# Patient Record
Sex: FEMALE | Race: OTHER | NOT HISPANIC OR LATINO | Employment: OTHER | URBAN - METROPOLITAN AREA
[De-identification: names, ages, dates, MRNs, and addresses within clinical notes are randomized per-mention and may not be internally consistent; named-entity substitution may affect disease eponyms.]

---

## 2023-08-27 PROBLEM — K21.00 REFLUX ESOPHAGITIS: Status: ACTIVE | Noted: 2023-08-27

## 2023-08-27 PROBLEM — R13.19 ESOPHAGEAL DYSPHAGIA: Status: ACTIVE | Noted: 2023-08-27

## 2023-08-27 PROBLEM — M17.12 ARTHRITIS OF KNEE, LEFT: Status: ACTIVE | Noted: 2023-08-27

## 2023-08-27 PROBLEM — C78.00: Status: ACTIVE | Noted: 2023-08-27

## 2023-08-27 PROBLEM — I50.9 HEART FAILURE (MULTI): Status: ACTIVE | Noted: 2023-08-27

## 2023-08-27 PROBLEM — I51.89 IMPAIRED CARDIAC FUNCTION: Status: ACTIVE | Noted: 2023-08-27

## 2023-08-27 PROBLEM — R59.0 AXILLARY ADENOPATHY: Status: ACTIVE | Noted: 2023-08-27

## 2023-08-27 PROBLEM — C79.31: Status: ACTIVE | Noted: 2023-08-27

## 2023-08-27 PROBLEM — Z96.1 ARTIFICIAL LENS PRESENT: Status: ACTIVE | Noted: 2023-08-27

## 2023-08-27 PROBLEM — D05.10 DUCTAL CARCINOMA IN SITU (DCIS) OF BREAST: Status: ACTIVE | Noted: 2023-08-27

## 2023-08-27 PROBLEM — R07.89 ATYPICAL CHEST PAIN: Status: ACTIVE | Noted: 2023-08-27

## 2023-08-27 PROBLEM — M54.9 BACKACHE: Status: ACTIVE | Noted: 2023-08-27

## 2023-08-27 PROBLEM — J18.9 PNEUMONIA: Status: ACTIVE | Noted: 2023-08-27

## 2023-08-27 PROBLEM — H25.9 AGE-RELATED CATARACT OF BOTH EYES: Status: ACTIVE | Noted: 2023-08-27

## 2023-08-27 PROBLEM — N95.1 MENOPAUSAL SYMPTOM: Status: ACTIVE | Noted: 2023-08-27

## 2023-08-27 PROBLEM — G47.33 OBSTRUCTIVE SLEEP APNEA: Status: ACTIVE | Noted: 2023-08-27

## 2023-08-27 PROBLEM — H40.013 OPEN ANGLE WITH BORDERLINE FINDINGS, LOW RISK, BILATERAL: Status: ACTIVE | Noted: 2023-08-27

## 2023-08-27 PROBLEM — R93.89 ENDOMETRIAL THICKENING ON ULTRASOUND: Status: ACTIVE | Noted: 2023-08-27

## 2023-08-27 PROBLEM — M54.50 ACUTE LOW BACK PAIN: Status: ACTIVE | Noted: 2023-08-27

## 2023-08-27 PROBLEM — R50.9 FEVER: Status: ACTIVE | Noted: 2023-08-27

## 2023-08-27 PROBLEM — E04.2 NONTOXIC MULTINODULAR GOITER: Status: ACTIVE | Noted: 2023-08-27

## 2023-08-27 PROBLEM — M25.569 KNEE PAIN: Status: ACTIVE | Noted: 2023-08-27

## 2023-08-27 PROBLEM — M47.816 LUMBAR SPONDYLOSIS: Status: ACTIVE | Noted: 2023-08-27

## 2023-08-27 PROBLEM — J02.9 SORE THROAT: Status: ACTIVE | Noted: 2023-08-27

## 2023-08-27 RX ORDER — GABAPENTIN 100 MG/1
CAPSULE ORAL
COMMUNITY
Start: 2023-03-16

## 2023-08-27 RX ORDER — DIPHENHYDRAMINE HCL 50 MG
50 CAPSULE ORAL EVERY 6 HOURS PRN
COMMUNITY

## 2023-08-27 RX ORDER — AMLODIPINE BESYLATE 5 MG/1
TABLET ORAL
COMMUNITY
Start: 2023-04-10

## 2023-08-27 RX ORDER — PRAVASTATIN SODIUM 40 MG/1
40 TABLET ORAL DAILY
COMMUNITY
Start: 2021-07-21 | End: 2023-11-01 | Stop reason: ALTCHOICE

## 2023-08-27 RX ORDER — ASPIRIN 325 MG
50000 TABLET, DELAYED RELEASE (ENTERIC COATED) ORAL
COMMUNITY

## 2023-08-27 RX ORDER — EPINEPHRINE 0.3 MG/.3ML
1 INJECTION SUBCUTANEOUS ONCE AS NEEDED
COMMUNITY

## 2023-08-27 RX ORDER — PRAVASTATIN SODIUM 10 MG/1
10 TABLET ORAL NIGHTLY
COMMUNITY
End: 2023-11-01 | Stop reason: ALTCHOICE

## 2023-08-27 RX ORDER — PRAVASTATIN SODIUM 80 MG/1
80 TABLET ORAL DAILY
COMMUNITY
Start: 2023-08-17 | End: 2024-04-24

## 2023-08-27 RX ORDER — ACETAMINOPHEN 500 MG
50 TABLET ORAL DAILY
COMMUNITY

## 2023-08-27 RX ORDER — ERGOCALCIFEROL 1.25 MG/1
1 CAPSULE ORAL
COMMUNITY

## 2023-08-27 RX ORDER — ASPIRIN 81 MG/1
81 TABLET ORAL DAILY
COMMUNITY
Start: 2018-03-06

## 2023-08-27 RX ORDER — IBUPROFEN 200 MG
200 TABLET ORAL EVERY 6 HOURS PRN
COMMUNITY

## 2023-08-27 RX ORDER — HYDROCORTISONE 25 MG/G
1 CREAM TOPICAL 2 TIMES DAILY
COMMUNITY

## 2023-08-27 RX ORDER — LORATADINE 10 MG/1
10 TABLET ORAL DAILY
COMMUNITY

## 2023-08-27 RX ORDER — PREDNISONE 20 MG/1
20 TABLET ORAL EVERY MORNING
COMMUNITY
End: 2023-11-01 | Stop reason: WASHOUT

## 2023-09-28 DIAGNOSIS — C34.12 MALIGNANT NEOPLASM OF UPPER LOBE OF LEFT LUNG (MULTI): Primary | ICD-10-CM

## 2023-10-05 ENCOUNTER — ANCILLARY PROCEDURE (OUTPATIENT)
Dept: RADIOLOGY | Facility: CLINIC | Age: 73
End: 2023-10-05
Payer: MEDICARE

## 2023-10-05 DIAGNOSIS — E04.2 NONTOXIC MULTINODULAR GOITER: ICD-10-CM

## 2023-10-05 PROCEDURE — 76536 US EXAM OF HEAD AND NECK: CPT

## 2023-10-05 PROCEDURE — 76536 US EXAM OF HEAD AND NECK: CPT | Performed by: RADIOLOGY

## 2023-10-12 ENCOUNTER — ANCILLARY PROCEDURE (OUTPATIENT)
Dept: RADIOLOGY | Facility: CLINIC | Age: 73
End: 2023-10-12
Payer: MEDICARE

## 2023-10-12 DIAGNOSIS — C34.12 MALIGNANT NEOPLASM OF UPPER LOBE OF LEFT LUNG (MULTI): ICD-10-CM

## 2023-10-12 PROCEDURE — 2550000001 HC RX 255 CONTRASTS: Performed by: STUDENT IN AN ORGANIZED HEALTH CARE EDUCATION/TRAINING PROGRAM

## 2023-10-12 PROCEDURE — 72158 MRI LUMBAR SPINE W/O & W/DYE: CPT | Mod: ME

## 2023-10-12 PROCEDURE — A9575 INJ GADOTERATE MEGLUMI 0.1ML: HCPCS | Performed by: STUDENT IN AN ORGANIZED HEALTH CARE EDUCATION/TRAINING PROGRAM

## 2023-10-12 RX ORDER — GADOTERATE MEGLUMINE 376.9 MG/ML
15 INJECTION INTRAVENOUS
Status: COMPLETED | OUTPATIENT
Start: 2023-10-12 | End: 2023-10-12

## 2023-10-12 RX ADMIN — GADOTERATE MEGLUMINE 15 ML: 376.9 INJECTION INTRAVENOUS at 14:39

## 2023-10-13 DIAGNOSIS — E04.2 NONTOXIC MULTINODULAR GOITER: Primary | ICD-10-CM

## 2023-10-17 ENCOUNTER — TELEPHONE (OUTPATIENT)
Dept: HEMATOLOGY/ONCOLOGY | Facility: CLINIC | Age: 73
End: 2023-10-17
Payer: MEDICARE

## 2023-10-17 DIAGNOSIS — M47.816 LUMBAR SPONDYLOSIS: Primary | ICD-10-CM

## 2023-10-17 NOTE — TELEPHONE ENCOUNTER
Read the results of the MRI to Teresa.  She did a teachback.  She will follow up with her PCP concerning her back pain.  Dr. Kincaid notified of this call.

## 2023-10-26 ENCOUNTER — LAB (OUTPATIENT)
Dept: LAB | Facility: LAB | Age: 73
End: 2023-10-26
Payer: MEDICARE

## 2023-10-26 DIAGNOSIS — E04.2 NONTOXIC MULTINODULAR GOITER: Primary | ICD-10-CM

## 2023-10-26 LAB — TSH SERPL-ACNC: 2.43 MIU/L (ref 0.44–3.98)

## 2023-10-26 PROCEDURE — 84443 ASSAY THYROID STIM HORMONE: CPT

## 2023-10-26 PROCEDURE — 36415 COLL VENOUS BLD VENIPUNCTURE: CPT

## 2023-11-01 ENCOUNTER — OFFICE VISIT (OUTPATIENT)
Dept: OPHTHALMOLOGY | Facility: CLINIC | Age: 73
End: 2023-11-01
Payer: MEDICARE

## 2023-11-01 ENCOUNTER — APPOINTMENT (OUTPATIENT)
Dept: OPHTHALMOLOGY | Facility: CLINIC | Age: 73
End: 2023-11-01
Payer: MEDICARE

## 2023-11-01 ENCOUNTER — CLINICAL SUPPORT (OUTPATIENT)
Dept: OPHTHALMOLOGY | Facility: CLINIC | Age: 73
End: 2023-11-01
Payer: MEDICARE

## 2023-11-01 ENCOUNTER — APPOINTMENT (OUTPATIENT)
Dept: ORTHOPEDIC SURGERY | Facility: CLINIC | Age: 73
End: 2023-11-01
Payer: MEDICARE

## 2023-11-01 DIAGNOSIS — H00.021 HORDEOLUM INTERNUM OF RIGHT UPPER EYELID: ICD-10-CM

## 2023-11-01 DIAGNOSIS — H52.7 REFRACTIVE ERROR: ICD-10-CM

## 2023-11-01 DIAGNOSIS — Z96.1 ARTIFICIAL LENS PRESENT: ICD-10-CM

## 2023-11-01 DIAGNOSIS — H02.889 MEIBOMIAN GLAND DYSFUNCTION: ICD-10-CM

## 2023-11-01 DIAGNOSIS — H25.812 COMBINED FORMS OF AGE-RELATED CATARACT OF LEFT EYE: ICD-10-CM

## 2023-11-01 DIAGNOSIS — H40.013 OPEN ANGLE WITH BORDERLINE FINDINGS, LOW RISK, BILATERAL: Primary | ICD-10-CM

## 2023-11-01 PROCEDURE — 99214 OFFICE O/P EST MOD 30 MIN: CPT | Performed by: OPHTHALMOLOGY

## 2023-11-01 PROCEDURE — 92133 CPTRZD OPH DX IMG PST SGM ON: CPT | Performed by: OPHTHALMOLOGY

## 2023-11-01 PROCEDURE — 92015 DETERMINE REFRACTIVE STATE: CPT | Performed by: OPHTHALMOLOGY

## 2023-11-01 RX ORDER — PANTOPRAZOLE SODIUM 40 MG/1
40 TABLET, DELAYED RELEASE ORAL
COMMUNITY
Start: 2023-05-18

## 2023-11-01 RX ORDER — OMEPRAZOLE 40 MG/1
40 CAPSULE, DELAYED RELEASE ORAL
COMMUNITY
Start: 2023-07-12

## 2023-11-01 RX ORDER — TRAMADOL HYDROCHLORIDE 50 MG/1
50 TABLET ORAL
COMMUNITY
Start: 2023-06-01

## 2023-11-01 ASSESSMENT — KERATOMETRY
OS_AXISANGLE2_DEGREES: 50
OD_AXISANGLE2_DEGREES: 180
OS_K1POWER_DIOPTERS: 44.00
OD_K2POWER_DIOPTERS: 44.50
OS_AXISANGLE_DEGREES: 140
OD_K1POWER_DIOPTERS: 44.25
METHOD_AUTO_MANUAL: AUTOMATED
OS_K2POWER_DIOPTERS: 44.25
OD_AXISANGLE_DEGREES: 90

## 2023-11-01 ASSESSMENT — VISUAL ACUITY
OS_CC: 20/30
OS_CC+: -1
METHOD: SNELLEN - SINGLE
OD_CC+: +1
CORRECTION_TYPE: GLASSES
OD_CC: 20/30

## 2023-11-01 ASSESSMENT — REFRACTION_WEARINGRX
OD_ADD: +2.75
OS_AXIS: 068
OD_SPHERE: +1.50
OD_AXIS: 150
OS_ADD: +2.75
OS_SPHERE: +0.50
OS_CYLINDER: -1.00
OD_CYLINDER: -0.25

## 2023-11-01 ASSESSMENT — TONOMETRY
IOP_METHOD: GOLDMANN APPLANATION
OD_IOP_MMHG: 21
OS_IOP_MMHG: 22

## 2023-11-01 ASSESSMENT — REFRACTION_MANIFEST
OD_CYLINDER: -1.25
OS_AXIS: 080
OS_CYLINDER: -1.25
OD_SPHERE: +1.75
OD_AXIS: 100
OS_SPHERE: +1.00

## 2023-11-01 ASSESSMENT — ENCOUNTER SYMPTOMS
CONSTITUTIONAL NEGATIVE: 0
CARDIOVASCULAR NEGATIVE: 0
ALLERGIC/IMMUNOLOGIC NEGATIVE: 0
HEMATOLOGIC/LYMPHATIC NEGATIVE: 0
GASTROINTESTINAL NEGATIVE: 0
EYES NEGATIVE: 0
RESPIRATORY NEGATIVE: 0
NEUROLOGICAL NEGATIVE: 0
LOSS OF SENSATION IN FEET: 0
DEPRESSION: 0
OCCASIONAL FEELINGS OF UNSTEADINESS: 0
ENDOCRINE NEGATIVE: 0
PSYCHIATRIC NEGATIVE: 0
MUSCULOSKELETAL NEGATIVE: 0

## 2023-11-01 ASSESSMENT — PATIENT HEALTH QUESTIONNAIRE - PHQ9
1. LITTLE INTEREST OR PLEASURE IN DOING THINGS: NOT AT ALL
SUM OF ALL RESPONSES TO PHQ9 QUESTIONS 1 AND 2: 0
2. FEELING DOWN, DEPRESSED OR HOPELESS: NOT AT ALL

## 2023-11-01 ASSESSMENT — PAIN SCALES - GENERAL: PAINLEVEL: 6

## 2023-11-01 ASSESSMENT — EXTERNAL EXAM - LEFT EYE: OS_EXAM: NORMAL

## 2023-11-01 ASSESSMENT — SLIT LAMP EXAM - LIDS: COMMENTS: MEIBOMIAN GLAND DYSFUNCTION

## 2023-11-01 ASSESSMENT — CUP TO DISC RATIO
OS_RATIO: 0.45
OD_RATIO: 0.5

## 2023-11-01 ASSESSMENT — EXTERNAL EXAM - RIGHT EYE: OD_EXAM: NORMAL

## 2023-11-01 NOTE — ASSESSMENT & PLAN NOTE
Similar to previous episode left eye (OS). Responded well to heat and didn't require drops. Will have continue warm compress few times daily, can trial drops if not improving.

## 2023-11-01 NOTE — PROGRESS NOTES
Assessment/Plan   Problem List Items Addressed This Visit          Eye/Vision problems    Combined forms of age-related cataract of left eye     Non significant cataract noted on exam. Will plan to continue to monitor with serial exam.            Artificial lens present     Stable intraocular lens (IOL) right eye (OD), will monitor with serial exam.          Open angle with borderline findings, low risk, bilateral - Primary     Stable IOP both eyes (OU), does have slight global decrease in RNFL both eyes (OU) but very thick layers in general. Suspect still only a low risk borderline. Will continue to monitor with serial exam and OCT nerve.          Relevant Orders    OCT, Optic Nerve - OU - Both Eyes    Meibomian gland dysfunction     Baseline meibomian gland dysfunction (MGD), likely contributing to hordeolum. Advised will continue good warm compress.          Hordeolum internum of right upper eyelid     Similar to previous episode left eye (OS). Responded well to heat and didn't require drops. Will have continue warm compress few times daily, can trial drops if not improving.          Refractive error     Discussed glasses prescription from refraction. Will provide if patient interested in keeping for records or to fill as a new set of glasses.               Provided reassurance regarding above diagnoses and care received in the office visit today. Discussed outcomes and options along with the importance of treatment compliance. Understands the importance of any follow up visits. Patient instructed to call/communicate with our office if any new issues, questions, or concerns.     Will plan to see back in 1 year for full with OCT nerve or sooner PRN

## 2023-11-01 NOTE — PATIENT INSTRUCTIONS
Thank you so much for choosing me to provide your care today!    If you were dilated your vision may remain blurry   or light sensitive for several hours.    The nature of eye and vision problems can require frequent follow up, please make every effort to adhere to any future appointments.    If you have any issues, questions, or concerns,   please do not hesitate to reach out.    If you receive a survey in regards to your care today, please mention any exceptional care my office staff and/or technicians provided.    You can reach our office at this number:  [phone]     Warm Compress for Eyes    [x]Warm compress 1-2 time(s) daily to bilateral eye(s)  10-15 minutes on closed eyes  To make a reusable compress:  Place 1 cup dry uncooked rice in a clean sock  Tie a knot  Microwave for 10-20 seconds, may add more time as needed  Compress should be warm but not hot   Replace if soiled or develops bad odor  May follow compress with gentle eyelid massage

## 2023-11-01 NOTE — ASSESSMENT & PLAN NOTE
Baseline meibomian gland dysfunction (MGD), likely contributing to hordeolum. Advised will continue good warm compress.

## 2023-11-01 NOTE — ASSESSMENT & PLAN NOTE
Stable IOP both eyes (OU), does have slight global decrease in RNFL both eyes (OU) but very thick layers in general. Suspect still only a low risk borderline. Will continue to monitor with serial exam and OCT nerve.

## 2023-11-02 ENCOUNTER — LAB (OUTPATIENT)
Dept: LAB | Facility: LAB | Age: 73
End: 2023-11-02
Payer: MEDICARE

## 2023-11-02 DIAGNOSIS — R73.09 OTHER ABNORMAL GLUCOSE: ICD-10-CM

## 2023-11-02 DIAGNOSIS — I10 ESSENTIAL (PRIMARY) HYPERTENSION: Primary | ICD-10-CM

## 2023-11-02 DIAGNOSIS — E55.9 VITAMIN D DEFICIENCY, UNSPECIFIED: ICD-10-CM

## 2023-11-02 LAB
25(OH)D3 SERPL-MCNC: 45 NG/ML (ref 30–100)
ALBUMIN SERPL BCP-MCNC: 4.3 G/DL (ref 3.4–5)
ALP SERPL-CCNC: 72 U/L (ref 33–136)
ALT SERPL W P-5'-P-CCNC: 23 U/L (ref 7–45)
ANION GAP SERPL CALC-SCNC: 12 MMOL/L (ref 10–20)
AST SERPL W P-5'-P-CCNC: 15 U/L (ref 9–39)
BILIRUB SERPL-MCNC: 0.6 MG/DL (ref 0–1.2)
BUN SERPL-MCNC: 17 MG/DL (ref 6–23)
CALCIUM SERPL-MCNC: 10 MG/DL (ref 8.6–10.6)
CHLORIDE SERPL-SCNC: 106 MMOL/L (ref 98–107)
CHOLEST SERPL-MCNC: 224 MG/DL (ref 0–199)
CHOLESTEROL/HDL RATIO: 2.8
CO2 SERPL-SCNC: 29 MMOL/L (ref 21–32)
CREAT SERPL-MCNC: 0.62 MG/DL (ref 0.5–1.05)
ERYTHROCYTE [DISTWIDTH] IN BLOOD BY AUTOMATED COUNT: 13.2 % (ref 11.5–14.5)
EST. AVERAGE GLUCOSE BLD GHB EST-MCNC: 105 MG/DL
GFR SERPL CREATININE-BSD FRML MDRD: >90 ML/MIN/1.73M*2
GLUCOSE SERPL-MCNC: 98 MG/DL (ref 74–99)
HBA1C MFR BLD: 5.3 %
HCT VFR BLD AUTO: 43.9 % (ref 36–46)
HDLC SERPL-MCNC: 80.4 MG/DL
HGB BLD-MCNC: 13.9 G/DL (ref 12–16)
LDLC SERPL CALC-MCNC: 127 MG/DL
MCH RBC QN AUTO: 27 PG (ref 26–34)
MCHC RBC AUTO-ENTMCNC: 31.7 G/DL (ref 32–36)
MCV RBC AUTO: 85 FL (ref 80–100)
NON HDL CHOLESTEROL: 144 MG/DL (ref 0–149)
NRBC BLD-RTO: 0 /100 WBCS (ref 0–0)
PLATELET # BLD AUTO: 172 X10*3/UL (ref 150–450)
POTASSIUM SERPL-SCNC: 4.1 MMOL/L (ref 3.5–5.3)
PROT SERPL-MCNC: 7.2 G/DL (ref 6.4–8.2)
RBC # BLD AUTO: 5.15 X10*6/UL (ref 4–5.2)
SODIUM SERPL-SCNC: 143 MMOL/L (ref 136–145)
TRIGL SERPL-MCNC: 85 MG/DL (ref 0–149)
TSH SERPL-ACNC: 1.35 MIU/L (ref 0.44–3.98)
VIT B12 SERPL-MCNC: 357 PG/ML (ref 211–911)
VLDL: 17 MG/DL (ref 0–40)
WBC # BLD AUTO: 4.7 X10*3/UL (ref 4.4–11.3)

## 2023-11-02 PROCEDURE — 83036 HEMOGLOBIN GLYCOSYLATED A1C: CPT

## 2023-11-02 PROCEDURE — 82607 VITAMIN B-12: CPT

## 2023-11-02 PROCEDURE — 80061 LIPID PANEL: CPT

## 2023-11-02 PROCEDURE — 36415 COLL VENOUS BLD VENIPUNCTURE: CPT

## 2023-11-02 PROCEDURE — 84443 ASSAY THYROID STIM HORMONE: CPT

## 2023-11-02 PROCEDURE — 85027 COMPLETE CBC AUTOMATED: CPT

## 2023-11-02 PROCEDURE — 82306 VITAMIN D 25 HYDROXY: CPT

## 2023-11-02 PROCEDURE — 80053 COMPREHEN METABOLIC PANEL: CPT

## 2023-11-07 ENCOUNTER — HOSPITAL ENCOUNTER (OUTPATIENT)
Dept: RADIOLOGY | Facility: HOSPITAL | Age: 73
Discharge: HOME | End: 2023-11-07
Payer: MEDICARE

## 2023-11-07 VITALS
HEART RATE: 82 BPM | RESPIRATION RATE: 16 BRPM | DIASTOLIC BLOOD PRESSURE: 88 MMHG | TEMPERATURE: 98.4 F | OXYGEN SATURATION: 97 % | SYSTOLIC BLOOD PRESSURE: 167 MMHG

## 2023-11-07 DIAGNOSIS — E04.2 NONTOXIC MULTINODULAR GOITER: ICD-10-CM

## 2023-11-07 PROCEDURE — 2720000007 HC OR 272 NO HCPCS

## 2023-11-07 PROCEDURE — 88112 CYTOPATH CELL ENHANCE TECH: CPT | Mod: TC,MCY | Performed by: RADIOLOGY

## 2023-11-07 PROCEDURE — 76942 ECHO GUIDE FOR BIOPSY: CPT

## 2023-11-07 PROCEDURE — 88173 CYTOPATH EVAL FNA REPORT: CPT | Performed by: PATHOLOGY

## 2023-11-07 PROCEDURE — 88112 CYTOPATH CELL ENHANCE TECH: CPT | Mod: TC | Performed by: RADIOLOGY

## 2023-11-07 PROCEDURE — 2500000005 HC RX 250 GENERAL PHARMACY W/O HCPCS: Performed by: RADIOLOGY

## 2023-11-07 PROCEDURE — 88173 CYTOPATH EVAL FNA REPORT: CPT | Mod: TC | Performed by: RADIOLOGY

## 2023-11-07 RX ORDER — LIDOCAINE HYDROCHLORIDE 10 MG/ML
INJECTION, SOLUTION EPIDURAL; INFILTRATION; INTRACAUDAL; PERINEURAL AS NEEDED
Status: COMPLETED | OUTPATIENT
Start: 2023-11-07 | End: 2023-11-07

## 2023-11-07 RX ADMIN — LIDOCAINE HYDROCHLORIDE 2 ML: 10 INJECTION, SOLUTION EPIDURAL; INFILTRATION; INTRACAUDAL; PERINEURAL at 09:05

## 2023-11-07 ASSESSMENT — PAIN - FUNCTIONAL ASSESSMENT: PAIN_FUNCTIONAL_ASSESSMENT: 0-10

## 2023-11-07 ASSESSMENT — PAIN SCALES - GENERAL
PAINLEVEL_OUTOF10: 5 - MODERATE PAIN
PAINLEVEL_OUTOF10: 0 - NO PAIN
PAINLEVEL_OUTOF10: 0 - NO PAIN
PAINLEVEL_OUTOF10: 4
PAINLEVEL_OUTOF10: 0 - NO PAIN
PAINLEVEL_OUTOF10: 0 - NO PAIN
PAINLEVEL_OUTOF10: 7

## 2023-11-07 ASSESSMENT — COLUMBIA-SUICIDE SEVERITY RATING SCALE - C-SSRS
2. HAVE YOU ACTUALLY HAD ANY THOUGHTS OF KILLING YOURSELF?: NO
1. IN THE PAST MONTH, HAVE YOU WISHED YOU WERE DEAD OR WISHED YOU COULD GO TO SLEEP AND NOT WAKE UP?: NO
6. HAVE YOU EVER DONE ANYTHING, STARTED TO DO ANYTHING, OR PREPARED TO DO ANYTHING TO END YOUR LIFE?: NO

## 2023-11-08 ENCOUNTER — OFFICE VISIT (OUTPATIENT)
Dept: ORTHOPEDIC SURGERY | Facility: CLINIC | Age: 73
End: 2023-11-08
Payer: MEDICARE

## 2023-11-08 ENCOUNTER — ANCILLARY PROCEDURE (OUTPATIENT)
Dept: RADIOLOGY | Facility: CLINIC | Age: 73
End: 2023-11-08
Payer: MEDICARE

## 2023-11-08 DIAGNOSIS — M47.816 LUMBAR SPONDYLOSIS: ICD-10-CM

## 2023-11-08 PROCEDURE — 1160F RVW MEDS BY RX/DR IN RCRD: CPT | Performed by: ORTHOPAEDIC SURGERY

## 2023-11-08 PROCEDURE — 72110 X-RAY EXAM L-2 SPINE 4/>VWS: CPT

## 2023-11-08 PROCEDURE — 1125F AMNT PAIN NOTED PAIN PRSNT: CPT | Performed by: ORTHOPAEDIC SURGERY

## 2023-11-08 PROCEDURE — 1159F MED LIST DOCD IN RCRD: CPT | Performed by: ORTHOPAEDIC SURGERY

## 2023-11-08 PROCEDURE — 1036F TOBACCO NON-USER: CPT | Performed by: ORTHOPAEDIC SURGERY

## 2023-11-08 PROCEDURE — 99214 OFFICE O/P EST MOD 30 MIN: CPT | Performed by: ORTHOPAEDIC SURGERY

## 2023-11-08 PROCEDURE — 72110 X-RAY EXAM L-2 SPINE 4/>VWS: CPT | Performed by: RADIOLOGY

## 2023-11-08 ASSESSMENT — PAIN - FUNCTIONAL ASSESSMENT: PAIN_FUNCTIONAL_ASSESSMENT: 0-10

## 2023-11-08 ASSESSMENT — PAIN SCALES - GENERAL: PAINLEVEL_OUTOF10: 6

## 2023-11-08 ASSESSMENT — PAIN DESCRIPTION - DESCRIPTORS: DESCRIPTORS: ACHING;SORE

## 2023-11-08 NOTE — PROGRESS NOTES
Chief Complaint: Chronic low back pain    HPI: Teresa Max is a 73 y.o. year old female patient with history of lung cancer and breast cancer status post chemo and radiation treatments who presents with chronic axial low back pain has been on and off for several years.  No associated trauma or injury that she can recall.  Worse with activity such as bending or getting up from a sitting to standing position.  She otherwise denies any radicular leg pain or claudication pain in her lower extremities.  Denies any numbness tingling or paresthesia.  Denies any bowel or bladder disturbances or saddle anesthesia or incontinence.  No history of spinal surgery injections or physical therapy.  She does take ibuprofen as needed which provides some temporary relief.      Review of Systems    All other systems have been reviewed and are negative for complaint. All pertinent positive and negative as listed in history of present illness.    Past Medical History:   Diagnosis Date    Combined forms of age-related cataract of left eye     Disorder of refraction     Eyelid inflammation     Intraductal carcinoma in situ of unspecified breast 02/19/2018    DCIS (ductal carcinoma in situ) of breast    Malignant neoplasm of unspecified part of unspecified bronchus or lung (CMS/HCC) 02/17/2016    Non-small cell lung cancer (NSCLC)    Open angle with borderline findings, low risk, bilateral     Pseudophakia         Past Surgical History:   Procedure Laterality Date    BREAST LUMPECTOMY  02/17/2016    Breast Surgery Lumpectomy    CATARACT EXTRACTION Right     2016 Dr Barry    CT GUIDED IMAGING FOR NEEDLE PLACEMENT  05/22/2015    CT GUIDED IMAGING FOR NEEDLE PLACEMENT [facility]    INTRAOCULAR LENS INSERTION      OTHER SURGICAL HISTORY  02/17/2016    Biopsy Lung Percutaneous    OTHER SURGICAL HISTORY  07/21/2021    Tonsillectomy with adenoidectomy    OTHER SURGICAL HISTORY  02/16/2018    Radiation Therapy    US GUIDED THYROID  BIOPSY  11/7/2023    US GUIDED THYROID BIOPSY 11/7/2023 CAT US        Allergies   Allergen Reactions    Acetaminophen Anaphylaxis, Hives, Itching and Rash    Azithromycin Anaphylaxis, Hives, Itching and Rash    Cpm-Pseudoephed-Acetaminophen Anaphylaxis    Guaifenesin Anaphylaxis and Rash    Hydrocodone Hives and Itching    Gsvybqjiv-Bt-Xfeqmzmj-Guaifen Unknown    Ceftriaxone Itching        Current Outpatient Medications on File Prior to Visit   Medication Sig Dispense Refill    amLODIPine (Norvasc) 5 mg tablet       aspirin 81 mg EC tablet Take 1 tablet (81 mg) by mouth once daily. AS DIRECTED.      cholecalciferol (Vitamin D-3) 1,250 mcg (50,000 unit) capsule Take 1 capsule (50,000 Units) by mouth 1 (one) time per week.      cholecalciferol (Vitamin D3) 50 mcg (2,000 unit) capsule Take 1 capsule (50 mcg) by mouth once daily.      diphenhydrAMINE (BENADryl) 50 mg capsule Take 1 capsule (50 mg) by mouth every 6 hours if needed for itching.      ergocalciferol (Vitamin D-2) 1.25 MG (44980 UT) capsule Take 1 capsule (1,250 mcg) by mouth.      hydrocortisone 2.5 % cream Apply 1 Application topically 2 times a day.      ibuprofen 200 mg tablet Take 1 tablet (200 mg) by mouth every 6 hours if needed.      omeprazole (PriLOSEC) 40 mg DR capsule Take 1 capsule (40 mg) by mouth once daily in the morning. Take before meals.      pantoprazole (ProtoNix) 40 mg EC tablet Take 1 tablet (40 mg) by mouth once daily in the morning. Take before meals.      pravastatin (Pravachol) 80 mg tablet Take 1 tablet (80 mg) by mouth once daily.      traMADol (Ultram) 50 mg tablet Take 1 tablet (50 mg) by mouth.      EPINEPHrine 0.3 mg/0.3 mL injection syringe Inject 0.3 mL (0.3 mg) as directed 1 time if needed (shortness of breath).      gabapentin (Neurontin) 100 mg capsule       loratadine (Claritin) 10 mg tablet Take 1 tablet (10 mg) by mouth once daily.       No current facility-administered medications on file prior to visit.          PE:    General: Patient appears well-nourished and well-developed in no acute distress, Alert and Oriented x3  Psych: Pleasant mood and affect  HEENT: Extraocular muscles intact, pupils equal and round. Sclerae anicteric   Cardio: extremities warm and well perfused  Resp: unlabored symmetric breathing  Skin: no open wounds or rash  Musculoskeletal/Neuro Exam: Normal gait.  Mild tenderness to palpation along the midline lumbar and paraspinal.  Negative straight leg raise bilaterally.  She actually has pretty good flexibility in her hamstrings bilaterally no groin pain with ROM of the hip joints with IR and ER.  Negative ASIF bilaterally.    Lower extremity    Motor: Right leg with 5 out of 5 motor strength with hip flexion, knee extension, ankle dorsiflexion plantarflexion and EHL against resistance.  Left leg with 5 out of 5 motor strength with hip flexion, knee extension, ankle dorsiflexion plantarflexion EHL against resistance  Sensation to light touch intact along L2 to S1 distribution bilaterally  2+ patella and achilles Reflex bilaterally        Imaging:  I reviewed imaging obtained in clinic today AP lateral and oblique views of the lumbar spine.  No acute fractures or spondylolisthesis or dynamic instability.  She does have some degenerative changes with disc base narrowing at L5-S1 facet arthrosis.  I also see this round calcification in her left upper abdomen region.  This was also seen in previous CT scan of the abdomen and chest which according to radiologist has been stable.  Patient was informed about that she was not aware of this calcification.    I also reviewed an MRI of the lumbar spine from October 12, 2023 which showed no evidence of metastatic lesions.  She does have 6 multilevel degenerative changes and smarts nodes.  She has no significant stenosis or nerve impingement.                A/P: Teresa Max is a 73 y.o. year old female patient with history of chronic axial low back pain no associated  trauma or injury.  She has MRI that demonstrated no metastatic lesion despite her history of lung and breast cancer.  MRI and x-ray does show some multilevel degenerative changes and some Schmorl's nodes in her lumbar spine.  I review all the images with her in clinic today.  I recommend trying conservative treatments first with physical therapy for core back and lower extremity stretching and strengthening exercises which she is agreeable to.  She was given referral for therapy today.  If she does not see any relief then I would like to see her back in clinic.  She can continue anti-inflammatories as needed I also discussed using topical ointments and patches over-the-counter as well to help with any significant pain. After our discussion, the patient articulated understanding of the plan and felt that all questions had been answered satisfactorily. The patient was pleased with the visit and very appreciative for the care rendered.    **Please excuse any errors in grammar or translation related to this dictation. Voice recognition software was utilized to prepare this document. **                Jeannine Hand MD    Department of Orthopaedic Surgery  [facility]  [email or web address]

## 2023-11-09 LAB
LABORATORY COMMENT REPORT: NORMAL
LABORATORY COMMENT REPORT: NORMAL
PATH REPORT.FINAL DX SPEC: NORMAL
PATH REPORT.GROSS SPEC: NORMAL
PATH REPORT.RELEVANT HX SPEC: NORMAL
PATH REPORT.TOTAL CANCER: NORMAL

## 2024-03-27 ENCOUNTER — LAB (OUTPATIENT)
Dept: LAB | Facility: LAB | Age: 74
End: 2024-03-27
Payer: MEDICARE

## 2024-03-27 ENCOUNTER — TELEPHONE (OUTPATIENT)
Dept: HEMATOLOGY/ONCOLOGY | Facility: CLINIC | Age: 74
End: 2024-03-27

## 2024-03-27 DIAGNOSIS — I82.5Z9 CHRONIC DEEP VEIN THROMBOSIS (DVT) OF DISTAL VEIN OF LOWER EXTREMITY, UNSPECIFIED LATERALITY (MULTI): ICD-10-CM

## 2024-03-27 NOTE — PROGRESS NOTES
Subjective:   Patient Name: Teresa Max    : 1950     MRN: [id number]     Age: 73 y.o.     Gender: female  Referring Provider: SKIP    CC: Management of stage IIIB ([id number]) lung adenocarcinoma of OLIVER dx in 2015 s/p concurrent chemoRTàmetastatic recurrence  in 2016 with brain lesion, axillary LNs s/p gamma knife, alectinib 2016-2018-->again recurrence in 2018 s/p keytruda  2018-2020. PDL-1 50%, NGS showed ALK fusion. GALLO. TMB 7.9. Currently on surveillance.     Presenting History (2023):  At time of initial presentation a 72 F, remote lighter smoker (when she was a teenage) with PMHx of DCISof L breast in , HTN, HLD, NSCLC presented here for continued care of her lung cancer. She was initially diagnosed with stage IIIB (T1N3M0) on  2015. PET/CT on 2015 showed primary lung carcinoma in the OLIVER measuring 1.2cm, with large L and R hilar LAD. Brain MRI (+/-) 2015 no brain metastases. She underwent concurrent chemoRT with cisplatin and pemetrexed completed in 2015.  She did not tolerate very well.     Unfortunately, she had metastatic recurrence in 2016 with PET/CT on 2016 showed high hypermetabolic activity in the L axillary LNs, consistent with mets, which was biopsy proven to be metastatic lung adenocarcinoma. Brain MRI (+/-) on 2016  showed bilateral enhancing lesions within the occipital lobes.She underwent gamma knife with Dr. Diaz to 3 brain lesions. She was then started on alectinib 600mg bid in 2016.     Unfortunately, her PET/CT on 1/3/2018 showed POS with interval worsening of hypermetabolic L axillary and retropectoral LAD. Brain MRI 2018: unchanged. L axillary LN biopsy on 2018 which showed poorly differentiated lung adenocarcinoma. PDL-1  50%. NGS showed ALK (20)-EML4(6) fusion, and IDH1 yW255W (c338T>C) mutation, TMB 7.9 m/MB, MSI stable. She was then started on Keytruda on 2018-2020. She has been on  surveillance since. Most recent CT CAP (+) on 3/6/2023 showed no evidence  of recurrence except for mosaic lung pattern and RML fibrosis. 4.3cm L adnexal cyst. Brain MRI (+/-) 03/01/2023: Interval decreased size of the enhancing lesion within the left occipital lobe measuring 0.4 cm, previously 0.5 cm. Decreased surrounding  T2 and FLAIR hyperintense signal compatible with vasogenic edema. No new enhancing lesions.     She has been having chronic back pain, worsening with standing and moving, intermittently, tolerable. No fever or chills. No cough or SOB. No n/v/d/c. No HA or vision change.      PMHx: DCIS 2008, HTN, HLD, NSCLC  PSHx: Breast lumpectomy in 2008  SHx: Remote light smoker, no etoh or illicit drugs. She worked at [employer]. Lives with . 3 children and 4 grandchildren.   FHx: Mother had brain tumor (unclear primary site). No other family hx of cancer.         Treatment Summary:  05/2015-07/2015: Concurrent chemoRT with cisplatin/pemetrexed  03/2016: Gamma knife to 3 brain mets (Dr. Mickey Diaz) L occipital (18 Gy x 1 fraction), R occipital (20 Gy x 1 fraction) and L precuneus (20 Gy x 1 fraction)  04/2016 - 06/2018: started Alectinib 300mg bid-->600mg bid PO  06/11/2018-08/26/2020: Keytruda    Interval History (3/27/2024):  She returned for a follow up. Overall feeling well except for some fatigue. Also having chronic back pain and L knee pain for which is managed by her PCP. Otherwise no cough, SOB, n/v/d/c, fever or chills. Eating and voiding well. Wants to travel to [place] this year if possible.        ROS:  Patient denies the below review of systems, except for changes as noted in the interval history.    General:  Fatigue, anorexia, fevers, sweats, chills, heat/cold intolerance, weight changes.  HEENT:  Icterus, congestion, sore throat, rhinorrhea, taste change, voice changes.  Neurology:  Headache, dizziness, vision changes, hearing changes, other motor/sensory loss, gait instability,  neuropathies.  Pulmonology:  Cough, wheezing, hemoptysis, dyspnea at rest, dyspnea on exertion, pleuritic chest pain.  Cardiology:  Chest pain, palpitations, orthopnea, paroxysmal nocturnal dyspnea.  Gastroenterology:  Nausea, vomiting, reflux symptoms, abdominal pain, constipation, diarrhea, melena, bright red blood per rectum.  Genitourinary:  Dysuria, polyuria, urine color change, urine smell change, hematuria.   Musculoskeletal:  Arthralgias, myalgias, joint swelling, focal weakness.  Skin:  Rash, pruritis, jaundice, petechiae, nail changes, skin nodules/growth.  Psychology:  Anxiety, depression, suicidal ideation, homicidal ideation.  Heme:  Easy bleeding or bruising.  Others:   All other systems reviewed and are negative.    Medical History:   Past Medical History:   Diagnosis Date    Combined forms of age-related cataract of left eye     Disorder of refraction     Eyelid inflammation     Intraductal carcinoma in situ of unspecified breast 02/19/2018    DCIS (ductal carcinoma in situ) of breast    Malignant neoplasm of unspecified part of unspecified bronchus or lung (CMS/HCC) 02/17/2016    Non-small cell lung cancer (NSCLC)    Open angle with borderline findings, low risk, bilateral     Pseudophakia        Surgical History:   Past Surgical History:   Procedure Laterality Date    BREAST LUMPECTOMY  02/17/2016    Breast Surgery Lumpectomy    CATARACT EXTRACTION Right     2016 Dr Barry    CT GUIDED IMAGING FOR NEEDLE PLACEMENT  05/22/2015    CT GUIDED IMAGING FOR NEEDLE PLACEMENT [facility]    INTRAOCULAR LENS INSERTION      OTHER SURGICAL HISTORY  02/17/2016    Biopsy Lung Percutaneous    OTHER SURGICAL HISTORY  07/21/2021    Tonsillectomy with adenoidectomy    OTHER SURGICAL HISTORY  02/16/2018    Radiation Therapy    US GUIDED THYROID BIOPSY  11/7/2023    US GUIDED THYROID BIOPSY 11/7/2023 CAT US       Family History:  Family History   Problem Relation Name Age of Onset    Other (brain tumor) Mother       Cancer Mother         Allergies:  Allergies   Allergen Reactions    Acetaminophen Anaphylaxis, Hives, Itching and Rash    Azithromycin Anaphylaxis, Hives, Itching and Rash    Cpm-Pseudoephed-Acetaminophen Anaphylaxis    Guaifenesin Anaphylaxis and Rash    Hydrocodone Hives and Itching    Cqnwoiuyq-Uv-Ehcshldq-Guaifen Unknown    Ceftriaxone Itching       Meds (Current):    Current Outpatient Medications:     amLODIPine (Norvasc) 5 mg tablet, , Disp: , Rfl:     aspirin 81 mg EC tablet, Take 1 tablet (81 mg) by mouth once daily. AS DIRECTED., Disp: , Rfl:     cholecalciferol (Vitamin D-3) 1,250 mcg (50,000 unit) capsule, Take 1 capsule (50,000 Units) by mouth 1 (one) time per week., Disp: , Rfl:     cholecalciferol (Vitamin D3) 50 mcg (2,000 unit) capsule, Take 1 capsule (50 mcg) by mouth once daily., Disp: , Rfl:     diphenhydrAMINE (BENADryl) 50 mg capsule, Take 1 capsule (50 mg) by mouth every 6 hours if needed for itching., Disp: , Rfl:     EPINEPHrine 0.3 mg/0.3 mL injection syringe, Inject 0.3 mL (0.3 mg) as directed 1 time if needed (shortness of breath)., Disp: , Rfl:     ergocalciferol (Vitamin D-2) 1.25 MG (72169 UT) capsule, Take 1 capsule (1,250 mcg) by mouth., Disp: , Rfl:     gabapentin (Neurontin) 100 mg capsule, , Disp: , Rfl:     hydrocortisone 2.5 % cream, Apply 1 Application topically 2 times a day., Disp: , Rfl:     ibuprofen 200 mg tablet, Take 1 tablet (200 mg) by mouth every 6 hours if needed., Disp: , Rfl:     loratadine (Claritin) 10 mg tablet, Take 1 tablet (10 mg) by mouth once daily., Disp: , Rfl:     omeprazole (PriLOSEC) 40 mg DR capsule, Take 1 capsule (40 mg) by mouth once daily in the morning. Take before meals., Disp: , Rfl:     pantoprazole (ProtoNix) 40 mg EC tablet, Take 1 tablet (40 mg) by mouth once daily in the morning. Take before meals., Disp: , Rfl:     pravastatin (Pravachol) 80 mg tablet, Take 1 tablet (80 mg) by mouth once daily., Disp: , Rfl:     traMADol (Ultram) 50  mg tablet, Take 1 tablet (50 mg) by mouth., Disp: , Rfl:     Pain Assessment:  Pain is: mild, back and L knee pain    Performance Status (ECOG): 1         ECOG Definition  0 Fully active; no performance restrictions.  1 Strenuous physical activity restricted; fully ambulatory and able to carry out light work.  2 Capable of all self-care but unable to carry out any work activities. Up and about >50% of waking hours.  3 Capable of only limited self-care; confined to bed or chair >50% of waking hours.  4 Completely disabled; cannot carry out any self-care; totally confined to bed or chair.      Exam:  There were no vitals taken for this visit.  General: Well appearing, no acute distress  Neurology: Alert and oriented x3, CN II-XII grossly intact  Psychology: Affect appropriate  Eyes: PERRL, EOMI  ENT: Mucus membranes moist and intact, no ulcers  Lymphatics: No palpable adenopathy  Neck: Supple, no masses  Respiratory: Lungs clear bilaterally, no wheezes, no rales, no rhonchi  Cardiovascular: Normal rate and rhythm, no murmur  Abdomen: Soft, non-tender, non-distended, normoactive, no hepatosplenomegaly, no ascites  Musculoskeletal: Normal gait and stance  Extremities: No clubbing, no cyanosis, no edema  Skin: No rash  Genitourinary: Deferred  Rectal: Deferred   Pelvic: Deferred  Breasts: Deferred    Labs:  Reviewed  Assessment/Plan:   73 y.o. female with past medical history as stated referred for management of lung cancer    The patient's records and imaging have been reviewed in detail.  I have discussed with the patient the natural history of their disease at length.  The following has also been discussed with the patient:    # Cancer:  Management of stage IIIB ([id number]) lung adenocarcinoma of OLIVER dx in 05/2015 s/p concurrent chemoRT -->metastatic recurrence in 01/2016 with brain lesion, axillary LNs s/p gamma knife, alectinib 04/2016-06/2018 --> again recurrence in 06/2018 s/p keytruda 06/2018-08/2020. PDL-1 50%,  NGS showed ALK fusion. GALLO. TMB 7.9. Currently on surveillance. Continue to do well. No evidence of recurrence.      - Interval Imaging: CT CAP (+) on 03/28/2024 showed No change or new  metastatic disease in the chest. Brain MRI (+/-) 03/28/2024: 1. Redemonstration of a 3-4 mm enhancing nodule in the left occipital lobe, relatively similar from the previous examination. No new enhancing lesions are identified. 2. Mild white-matter changes are nonspecific but may represent small-vessel ischemic disease in a patient of this age.    # DCIS: dx in 2008, s/p lumpectomy and RT and 5 years of hormonal therapy. Most recent mammogram 02/2023: no evidence of recurrence.  To follow up with her GYN.     # Lower back pain: no evidence of mets. Follow up with PT an dPC.     # L adnexal cyst: US showed fibroid uterus and L ovarian cyst. To follow with her GYN    # Thyroid nodule: s/p thyroid nodule biopsy on 4/20/2021: benign follicular nodule.     # Clinical Trials:  None currently.      # Access: Peripheral veins.     # Pain: No acute pain.     # Bone Health: No signs of bony disease.      # GI/CINV: No acute issues.  Eating and voiding well.  Nutrition consult offered.     # Smoking: Former smoker     # Fertility: N/A.  Oncofertility support available as needed.       # Heme/ESAs: N/A.     # GOC: Patient is aware of palliative intent goals of care.     # Language: English.     # Dispo: RTC in 6 months

## 2024-03-28 ENCOUNTER — HOSPITAL ENCOUNTER (OUTPATIENT)
Dept: RADIOLOGY | Facility: CLINIC | Age: 74
Discharge: HOME | End: 2024-03-28
Payer: MEDICARE

## 2024-03-28 ENCOUNTER — OFFICE VISIT (OUTPATIENT)
Dept: HEMATOLOGY/ONCOLOGY | Facility: CLINIC | Age: 74
End: 2024-03-28
Payer: MEDICARE

## 2024-03-28 ENCOUNTER — LAB (OUTPATIENT)
Dept: LAB | Facility: CLINIC | Age: 74
End: 2024-03-28
Payer: MEDICARE

## 2024-03-28 VITALS
BODY MASS INDEX: 29.05 KG/M2 | TEMPERATURE: 97.9 F | HEIGHT: 61 IN | SYSTOLIC BLOOD PRESSURE: 159 MMHG | OXYGEN SATURATION: 96 % | DIASTOLIC BLOOD PRESSURE: 93 MMHG | RESPIRATION RATE: 18 BRPM | WEIGHT: 153.88 LBS | HEART RATE: 97 BPM

## 2024-03-28 DIAGNOSIS — C34.12 MALIGNANT NEOPLASM OF UPPER LOBE OF LEFT LUNG (MULTI): ICD-10-CM

## 2024-03-28 DIAGNOSIS — C79.31 MALIGNANT MELANOMA METASTATIC TO BRAIN (MULTI): Primary | ICD-10-CM

## 2024-03-28 DIAGNOSIS — I82.5Z9 CHRONIC DEEP VEIN THROMBOSIS (DVT) OF DISTAL VEIN OF LOWER EXTREMITY, UNSPECIFIED LATERALITY (MULTI): ICD-10-CM

## 2024-03-28 DIAGNOSIS — N83.202 CYST OF LEFT OVARY: ICD-10-CM

## 2024-03-28 DIAGNOSIS — C79.31 MALIGNANT MELANOMA METASTATIC TO BRAIN (MULTI): ICD-10-CM

## 2024-03-28 LAB
ALBUMIN SERPL BCP-MCNC: 4.3 G/DL (ref 3.4–5)
ALP SERPL-CCNC: 79 U/L (ref 33–136)
ALT SERPL W P-5'-P-CCNC: 18 U/L (ref 7–45)
ANION GAP SERPL CALC-SCNC: 11 MMOL/L (ref 10–20)
AST SERPL W P-5'-P-CCNC: 16 U/L (ref 9–39)
BASOPHILS # BLD AUTO: 0.04 X10*3/UL (ref 0–0.1)
BASOPHILS NFR BLD AUTO: 0.8 %
BILIRUB SERPL-MCNC: 0.5 MG/DL (ref 0–1.2)
BUN SERPL-MCNC: 16 MG/DL (ref 6–23)
CALCIUM SERPL-MCNC: 9.8 MG/DL (ref 8.6–10.3)
CHLORIDE SERPL-SCNC: 102 MMOL/L (ref 98–107)
CO2 SERPL-SCNC: 31 MMOL/L (ref 21–32)
CREAT SERPL-MCNC: 0.73 MG/DL (ref 0.5–1.05)
EGFRCR SERPLBLD CKD-EPI 2021: 88 ML/MIN/1.73M*2
EOSINOPHIL # BLD AUTO: 0.14 X10*3/UL (ref 0–0.4)
EOSINOPHIL NFR BLD AUTO: 2.9 %
ERYTHROCYTE [DISTWIDTH] IN BLOOD BY AUTOMATED COUNT: 12.8 % (ref 11.5–14.5)
GLUCOSE SERPL-MCNC: 95 MG/DL (ref 74–99)
HCT VFR BLD AUTO: 42.6 % (ref 36–46)
HGB BLD-MCNC: 13.6 G/DL (ref 12–16)
IMM GRANULOCYTES # BLD AUTO: 0.01 X10*3/UL (ref 0–0.5)
IMM GRANULOCYTES NFR BLD AUTO: 0.2 % (ref 0–0.9)
LYMPHOCYTES # BLD AUTO: 1.41 X10*3/UL (ref 0.8–3)
LYMPHOCYTES NFR BLD AUTO: 29.1 %
MCH RBC QN AUTO: 26.5 PG (ref 26–34)
MCHC RBC AUTO-ENTMCNC: 31.9 G/DL (ref 32–36)
MCV RBC AUTO: 83 FL (ref 80–100)
MONOCYTES # BLD AUTO: 0.5 X10*3/UL (ref 0.05–0.8)
MONOCYTES NFR BLD AUTO: 10.3 %
NEUTROPHILS # BLD AUTO: 2.74 X10*3/UL (ref 1.6–5.5)
NEUTROPHILS NFR BLD AUTO: 56.7 %
NRBC BLD-RTO: 0 /100 WBCS (ref 0–0)
PLATELET # BLD AUTO: 152 X10*3/UL (ref 150–450)
POTASSIUM SERPL-SCNC: 3.7 MMOL/L (ref 3.5–5.3)
PROT SERPL-MCNC: 7.4 G/DL (ref 6.4–8.2)
RBC # BLD AUTO: 5.13 X10*6/UL (ref 4–5.2)
SODIUM SERPL-SCNC: 140 MMOL/L (ref 136–145)
WBC # BLD AUTO: 4.8 X10*3/UL (ref 4.4–11.3)

## 2024-03-28 PROCEDURE — 1125F AMNT PAIN NOTED PAIN PRSNT: CPT | Performed by: STUDENT IN AN ORGANIZED HEALTH CARE EDUCATION/TRAINING PROGRAM

## 2024-03-28 PROCEDURE — 70553 MRI BRAIN STEM W/O & W/DYE: CPT | Performed by: RADIOLOGY

## 2024-03-28 PROCEDURE — 99214 OFFICE O/P EST MOD 30 MIN: CPT | Performed by: STUDENT IN AN ORGANIZED HEALTH CARE EDUCATION/TRAINING PROGRAM

## 2024-03-28 PROCEDURE — 2550000001 HC RX 255 CONTRASTS: Performed by: STUDENT IN AN ORGANIZED HEALTH CARE EDUCATION/TRAINING PROGRAM

## 2024-03-28 PROCEDURE — 74177 CT ABD & PELVIS W/CONTRAST: CPT | Performed by: STUDENT IN AN ORGANIZED HEALTH CARE EDUCATION/TRAINING PROGRAM

## 2024-03-28 PROCEDURE — 70553 MRI BRAIN STEM W/O & W/DYE: CPT

## 2024-03-28 PROCEDURE — 36415 COLL VENOUS BLD VENIPUNCTURE: CPT

## 2024-03-28 PROCEDURE — A9575 INJ GADOTERATE MEGLUMI 0.1ML: HCPCS | Performed by: STUDENT IN AN ORGANIZED HEALTH CARE EDUCATION/TRAINING PROGRAM

## 2024-03-28 PROCEDURE — 74177 CT ABD & PELVIS W/CONTRAST: CPT

## 2024-03-28 PROCEDURE — 71260 CT THORAX DX C+: CPT | Performed by: STUDENT IN AN ORGANIZED HEALTH CARE EDUCATION/TRAINING PROGRAM

## 2024-03-28 PROCEDURE — 80053 COMPREHEN METABOLIC PANEL: CPT

## 2024-03-28 PROCEDURE — 1159F MED LIST DOCD IN RCRD: CPT | Performed by: STUDENT IN AN ORGANIZED HEALTH CARE EDUCATION/TRAINING PROGRAM

## 2024-03-28 PROCEDURE — 85025 COMPLETE CBC W/AUTO DIFF WBC: CPT

## 2024-03-28 PROCEDURE — 2500000004 HC RX 250 GENERAL PHARMACY W/ HCPCS (ALT 636 FOR OP/ED): Performed by: STUDENT IN AN ORGANIZED HEALTH CARE EDUCATION/TRAINING PROGRAM

## 2024-03-28 RX ORDER — GADOTERATE MEGLUMINE 376.9 MG/ML
14 INJECTION INTRAVENOUS
Status: COMPLETED | OUTPATIENT
Start: 2024-03-28 | End: 2024-03-28

## 2024-03-28 RX ADMIN — GADOTERATE MEGLUMINE 14 ML: 376.9 INJECTION INTRAVENOUS at 13:58

## 2024-03-28 RX ADMIN — IOHEXOL 75 ML: 350 INJECTION, SOLUTION INTRAVENOUS at 13:13

## 2024-03-28 ASSESSMENT — PAIN SCALES - GENERAL: PAINLEVEL: 6

## 2024-03-28 NOTE — TELEPHONE ENCOUNTER
Spoke to Teresa.  Lab orders are in and she will come to the [facility] for a draw prior to her CT today.

## 2024-03-28 NOTE — PROGRESS NOTES
Patient here today for follow up. She was last here in September. She has generalized pain throughout her body. She has some SOB with exertion.     Fatigue: present, some difficulty completed IADLs  PO intake: adequate  N/V/D/C: denies    Medications reviewed with patient.     She will consider a referral to [facility] to follow for breast cancer survivorship.     Follow up in 6 months.

## 2024-04-09 ENCOUNTER — HOSPITAL ENCOUNTER (OUTPATIENT)
Dept: RADIOLOGY | Facility: CLINIC | Age: 74
Discharge: HOME | End: 2024-04-09
Payer: MEDICARE

## 2024-04-09 VITALS — BODY MASS INDEX: 28.7 KG/M2 | WEIGHT: 152 LBS | HEIGHT: 61 IN

## 2024-04-09 DIAGNOSIS — M85.80 OTHER SPECIFIED DISORDERS OF BONE DENSITY AND STRUCTURE, UNSPECIFIED SITE: ICD-10-CM

## 2024-04-09 DIAGNOSIS — Z12.31 ENCOUNTER FOR SCREENING MAMMOGRAM FOR MALIGNANT NEOPLASM OF BREAST: ICD-10-CM

## 2024-04-09 PROCEDURE — 77080 DXA BONE DENSITY AXIAL: CPT | Performed by: RADIOLOGY

## 2024-04-09 PROCEDURE — 77063 BREAST TOMOSYNTHESIS BI: CPT | Performed by: RADIOLOGY

## 2024-04-09 PROCEDURE — 77067 SCR MAMMO BI INCL CAD: CPT | Performed by: RADIOLOGY

## 2024-04-09 PROCEDURE — 77080 DXA BONE DENSITY AXIAL: CPT

## 2024-04-09 PROCEDURE — 77067 SCR MAMMO BI INCL CAD: CPT

## 2024-04-24 ENCOUNTER — APPOINTMENT (OUTPATIENT)
Dept: OBSTETRICS AND GYNECOLOGY | Facility: CLINIC | Age: 74
End: 2024-04-24
Payer: MEDICARE

## 2024-04-24 ENCOUNTER — OFFICE VISIT (OUTPATIENT)
Dept: OBSTETRICS AND GYNECOLOGY | Facility: CLINIC | Age: 74
End: 2024-04-24
Payer: MEDICARE

## 2024-04-24 ENCOUNTER — HOSPITAL ENCOUNTER (OUTPATIENT)
Dept: RADIOLOGY | Facility: CLINIC | Age: 74
Discharge: HOME | End: 2024-04-24
Payer: MEDICARE

## 2024-04-24 VITALS
BODY MASS INDEX: 29.45 KG/M2 | HEIGHT: 61 IN | DIASTOLIC BLOOD PRESSURE: 72 MMHG | WEIGHT: 156 LBS | SYSTOLIC BLOOD PRESSURE: 126 MMHG

## 2024-04-24 DIAGNOSIS — D25.1 INTRAMURAL LEIOMYOMA OF UTERUS: ICD-10-CM

## 2024-04-24 DIAGNOSIS — R93.89 ABNORMAL ULTRASOUND: ICD-10-CM

## 2024-04-24 DIAGNOSIS — D05.11 DUCTAL CARCINOMA IN SITU (DCIS) OF RIGHT BREAST: ICD-10-CM

## 2024-04-24 DIAGNOSIS — N95.8 GENITOURINARY SYNDROME OF MENOPAUSE: Primary | ICD-10-CM

## 2024-04-24 DIAGNOSIS — Z12.39 BREAST CANCER SCREENING, HIGH RISK PATIENT: ICD-10-CM

## 2024-04-24 DIAGNOSIS — N83.202 CYST OF LEFT OVARY: ICD-10-CM

## 2024-04-24 DIAGNOSIS — R93.89 INCREASED ENDOMETRIAL STRIPE: ICD-10-CM

## 2024-04-24 DIAGNOSIS — M85.80 OSTEOPENIA AFTER MENOPAUSE: ICD-10-CM

## 2024-04-24 DIAGNOSIS — Z78.0 OSTEOPENIA AFTER MENOPAUSE: ICD-10-CM

## 2024-04-24 PROCEDURE — 1126F AMNT PAIN NOTED NONE PRSNT: CPT | Performed by: OBSTETRICS & GYNECOLOGY

## 2024-04-24 PROCEDURE — 99213 OFFICE O/P EST LOW 20 MIN: CPT | Performed by: OBSTETRICS & GYNECOLOGY

## 2024-04-24 PROCEDURE — 76856 US EXAM PELVIC COMPLETE: CPT

## 2024-04-24 PROCEDURE — 1159F MED LIST DOCD IN RCRD: CPT | Performed by: OBSTETRICS & GYNECOLOGY

## 2024-04-24 PROCEDURE — 93976 VASCULAR STUDY: CPT | Performed by: RADIOLOGY

## 2024-04-24 PROCEDURE — 76856 US EXAM PELVIC COMPLETE: CPT | Performed by: RADIOLOGY

## 2024-04-24 ASSESSMENT — ENCOUNTER SYMPTOMS
LOSS OF SENSATION IN FEET: 0
OCCASIONAL FEELINGS OF UNSTEADINESS: 0
DEPRESSION: 0

## 2024-04-24 ASSESSMENT — SOCIAL DETERMINANTS OF HEALTH (SDOH)
WITHIN THE LAST YEAR, HAVE YOU BEEN AFRAID OF YOUR PARTNER OR EX-PARTNER?: NO
WITHIN THE LAST YEAR, HAVE YOU BEEN KICKED, HIT, SLAPPED, OR OTHERWISE PHYSICALLY HURT BY YOUR PARTNER OR EX-PARTNER?: NO
WITHIN THE LAST YEAR, HAVE YOU BEEN HUMILIATED OR EMOTIONALLY ABUSED IN OTHER WAYS BY YOUR PARTNER OR EX-PARTNER?: NO
WITHIN THE LAST YEAR, HAVE TO BEEN RAPED OR FORCED TO HAVE ANY KIND OF SEXUAL ACTIVITY BY YOUR PARTNER OR EX-PARTNER?: NO

## 2024-04-24 ASSESSMENT — LIFESTYLE VARIABLES
SKIP TO QUESTIONS 9-10: 1
HOW OFTEN DO YOU HAVE A DRINK CONTAINING ALCOHOL: MONTHLY OR LESS
HOW OFTEN DO YOU HAVE SIX OR MORE DRINKS ON ONE OCCASION: NEVER
HOW MANY STANDARD DRINKS CONTAINING ALCOHOL DO YOU HAVE ON A TYPICAL DAY: 1 OR 2
AUDIT-C TOTAL SCORE: 1

## 2024-04-24 ASSESSMENT — PATIENT HEALTH QUESTIONNAIRE - PHQ9
2. FEELING DOWN, DEPRESSED OR HOPELESS: NOT AT ALL
1. LITTLE INTEREST OR PLEASURE IN DOING THINGS: NOT AT ALL
SUM OF ALL RESPONSES TO PHQ9 QUESTIONS 1 & 2: 0

## 2024-04-24 ASSESSMENT — PAIN SCALES - GENERAL: PAINLEVEL: 0-NO PAIN

## 2024-04-24 NOTE — PROGRESS NOTES
atient Name:    :    MR #:    Acct #:      ASSESSMENT/PLAN:   1. Genitourinary syndrome of menopause      2. Abnormal ultrasound      3. Cyst of left ovary  -Ordered FU pelvic US.    4. Increased endometrial stripe      5. Breast cancer screening, high risk patient  -Mammogram up to date and negative 2024    6. Osteopenia after menopause  -FU with Dr. Bingham    7. Ductal carcinoma in situ (DCIS) of right breast   -Under the care of Dr. Kincaid    8. Uterine Leiomyoma    Counseling:  Medication education:  Education:  All new and/or current medications discussed and reviewed including side effects with patient/caregiver, Understanding:  Caregiver/Patient expressed understanding., Adherence:  Barriers to adherence identified and discussed if present, Education:  The patient is counseled regarding potential side-effects of all new medications, Understanding:  Patient expressed understanding, Adherence:  No barriers to adherence identified.     Exercise:  Minutes per week  Patient states:  30-60 min 3+ days/week, time for warm-up/cool-down by stretching..     Preventive Measures:  Colon screening  Colon screen date:  Up to date. Endoscopy just done - GERD.  Dr. Liriano.    Follow up due:   Breast cancer screening  Mammogram screening date:  Yearly.  Clinical breast examination date:  Yearly.     OB/GYN Preventive:  Self Breast Exam  Discussed.  Diet Weight Management  Discussed.  screening colonoscopy  Discussed and recommended starting age 45, then Q3-10 years depending on testing and family history..  osteoporosis prevention  discussed vit d, calcium supplements, weight-bearing exercise..  Genitourinary skin hygiene  Discussed.      CC: Annual     Teresa COULTER is here for her annual exam. She actually needs fu for abnormal findings on pelvic US. Following an ovarian cyst, and increased stripe on US last year. No pain, no abnormal bleeding or discharge.    Breast cancer survivor. Used Tamoxifen for 5 yrs    Past  Medical History:   Diagnosis Date    Breast cancer (Multi)     rt breast cancer with lumpectomy and radiation    Combined forms of age-related cataract of left eye     Disorder of refraction     Eyelid inflammation     Intraductal carcinoma in situ of unspecified breast 02/19/2018    DCIS (ductal carcinoma in situ) of breast    Malignant neoplasm of unspecified part of unspecified bronchus or lung (Multi) 02/17/2016    Non-small cell lung cancer (NSCLC)    Open angle with borderline findings, low risk, bilateral     Personal history of irradiation     Pseudophakia         Recent Surgeries in Obstetrics and Gynecology            No cases to display        Social History     Socioeconomic History    Marital status:      Spouse name: None    Number of children: None    Years of education: None    Highest education level: None   Occupational History    None   Tobacco Use    Smoking status: Never    Smokeless tobacco: Never   Vaping Use    Vaping status: Never Used   Substance and Sexual Activity    Alcohol use: Yes    Drug use: Never    Sexual activity: Yes     Partners: Male     Birth control/protection: Post-menopausal   Other Topics Concern    None   Social History Narrative    None     Social Determinants of Health     Financial Resource Strain: Low Risk  (5/29/2023)    Received from [facility]    Overall Financial Resource Strain (CARDIA)     Difficulty of Paying Living Expenses: Not hard at all   Food Insecurity: No Food Insecurity (5/29/2023)    Received from [facility]    Hunger Vital Sign     Worried About Running Out of Food in the Last Year: Never true     Ran Out of Food in the Last Year: Never true   Transportation Needs: No Transportation Needs (5/29/2023)    Received from [facility]    PRAPARE - Transportation     Lack of Transportation (Medical): No     Lack of Transportation (Non-Medical): No   Physical Activity: Not on file   Stress: Not on file   Social Connections: Not on  file   Intimate Partner Violence: Not At Risk (4/24/2024)    Humiliation, Afraid, Rape, and Kick questionnaire     Fear of Current or Ex-Partner: No     Emotionally Abused: No     Physically Abused: No     Sexually Abused: No   Housing Stability: Low Risk  (5/29/2023)    Received from [facility]    Housing Stability Vital Sign     Unable to Pay for Housing in the Last Year: No     Number of Places Lived in the Last Year: 1     Unstable Housing in the Last Year: No        Current Outpatient Medications on File Prior to Visit   Medication Sig    amLODIPine (Norvasc) 5 mg tablet     aspirin 81 mg EC tablet Take 1 tablet (81 mg) by mouth once daily. AS DIRECTED.    cholecalciferol (Vitamin D-3) 1,250 mcg (50,000 unit) capsule Take 1 capsule (50,000 Units) by mouth 1 (one) time per week.    cholecalciferol (Vitamin D3) 50 mcg (2,000 unit) capsule Take 1 capsule (50 mcg) by mouth once daily.    diphenhydrAMINE (BENADryl) 50 mg capsule Take 1 capsule (50 mg) by mouth every 6 hours if needed for itching.    EPINEPHrine 0.3 mg/0.3 mL injection syringe Inject 0.3 mL (0.3 mg) as directed 1 time if needed (shortness of breath).    ergocalciferol (Vitamin D-2) 1.25 MG (77746 UT) capsule Take 1 capsule (1,250 mcg) by mouth.    gabapentin (Neurontin) 100 mg capsule     hydrocortisone 2.5 % cream Apply 1 Application topically 2 times a day.    ibuprofen 200 mg tablet Take 1 tablet (200 mg) by mouth every 6 hours if needed.    loratadine (Claritin) 10 mg tablet Take 1 tablet (10 mg) by mouth once daily.    omega-3 fatty acids (FISH OIL CONCENTRATE ORAL) Take by mouth.    omeprazole (PriLOSEC) 40 mg DR capsule Take 1 capsule (40 mg) by mouth once daily in the morning. Take before meals.    pantoprazole (ProtoNix) 40 mg EC tablet Take 1 tablet (40 mg) by mouth once daily in the morning. Take before meals.    pravastatin (Pravachol) 80 mg tablet Take 1 tablet (80 mg) by mouth once daily.    traMADol (Ultram) 50 mg tablet Take 1  tablet (50 mg) by mouth.     No current facility-administered medications on file prior to visit.      Allergies   Allergen Reactions    Acetaminophen Anaphylaxis, Hives, Itching and Rash    Azithromycin Anaphylaxis, Hives, Itching and Rash    Cpm-Pseudoephed-Acetaminophen Anaphylaxis    Guaifenesin Anaphylaxis and Rash    Hydrocodone Hives and Itching    [id number]-Rpltbncs-Guaifen Unknown    Ceftriaxone Itching          ROS:   [facility] - WOMEN ONLY:          Breast Lump No acute changes.  Hot Flashes Occasional.  Painful [place] no.  Vaginal Discharge no.      [facility] - ROS Update:          Unexplained Weight Change no.  Pain anywhere in your body no.  Black or bloody stools no.  Problems with urination no.  Rashes or sores no.  Sexual problems no.  Depression or anxiety problems no.  Do you feel threatened by anyone No.      OBJECTIVE:   Visit Vitals  /72      Vitals:    04/24/24 0900   Weight: 70.8 kg (156 lb)    Weight change:      PHYSICAL EXAM  GENERAL:   General Appearance:  well-developed, well-nourished, no functional handicap, well-groomed.  Hygiene:  good.  Ill-appearance:  none.    HEART: RRR w/S1,S2  LUNGS:  Normal effort:  No respiratory distress. CTAB    BREASTS: General:  no masses, no tenderness, no skin changes, no nipple abnormality, and no axillary lymphadenopathy.   ABDOMEN: Tenderness:  none.  Distention:  none.   GENITOURINARY - FEMALE: Bladder:  normal.  Pelvic support defects:  not seen .  External genitalia:  Normal.  Urethra:  Normal.  Vagina:  no lesions, significant hypoestrogenic mucosa.  Uterus:  normal size/shape/consistency, non tender.  Adnexa:  normal , non tender. No unusual fullness of pelvis.  DERMATOLOGY:  Skin:  Aging appropiately.   EXTREMITIES: Normal:  no anomalies.  Edema:  none.  Superficial varicosities.  NEUROLOGICAL: Orientation:  alert and oriented x 3.   PSYCHOLOGY: Affect:  appropriate.  Mood:  pleasant.    Note: This dictation was generated using Dragon voice  recognition software. Please excuse any grammatical or spelling errors that may have occurred using the system.

## 2024-04-25 ENCOUNTER — HOSPITAL ENCOUNTER (OUTPATIENT)
Dept: RADIOLOGY | Facility: HOSPITAL | Age: 74
Discharge: HOME | End: 2024-04-25
Payer: MEDICARE

## 2024-04-25 DIAGNOSIS — M25.569 PAIN IN UNSPECIFIED KNEE: ICD-10-CM

## 2024-04-25 PROCEDURE — 73564 X-RAY EXAM KNEE 4 OR MORE: CPT | Mod: BILATERAL PROCEDURE | Performed by: RADIOLOGY

## 2024-04-25 PROCEDURE — 73564 X-RAY EXAM KNEE 4 OR MORE: CPT | Mod: 50

## 2024-05-08 ENCOUNTER — EVALUATION (OUTPATIENT)
Dept: PHYSICAL THERAPY | Facility: CLINIC | Age: 74
End: 2024-05-08
Payer: MEDICARE

## 2024-05-08 DIAGNOSIS — M47.816 LUMBAR SPONDYLOSIS: ICD-10-CM

## 2024-05-08 PROCEDURE — 97110 THERAPEUTIC EXERCISES: CPT | Mod: GP | Performed by: PHYSICAL THERAPIST

## 2024-05-08 PROCEDURE — 97530 THERAPEUTIC ACTIVITIES: CPT | Mod: GP | Performed by: PHYSICAL THERAPIST

## 2024-05-08 PROCEDURE — 97162 PT EVAL MOD COMPLEX 30 MIN: CPT | Mod: GP | Performed by: PHYSICAL THERAPIST

## 2024-05-08 ASSESSMENT — ENCOUNTER SYMPTOMS
DEPRESSION: 0
OCCASIONAL FEELINGS OF UNSTEADINESS: 0
LOSS OF SENSATION IN FEET: 0

## 2024-05-08 NOTE — PROGRESS NOTES
Physical Therapy Evaluation and Treatment      Patient Name: Teresa Max  MRN: [id number]  Today's Date: 5/8/2024  Time Calculation  Start Time: 1130  Stop Time: 1210  Time Calculation (min): 40 min  PT Therapeutic Procedures Time Entry  Therapeutic Exercise Time Entry: 15  Therapeutic Activity Time Entry: 10      Insurance:  Visit number: 1 of 6  [place]ization info: [place]   Ins[place] Type: Payor: MEDICARE / Plan: MEDICARE PART A AND B / Product Type: *No Product type* /     Current Problem:   1. Lumbar spondylosis  Referral to Physical Therapy    Follow Up In Physical Therapy          Subjective    General:  Pt states she has a constant low back pain. This has been around for several years. No specific injury. Does have breat and lung cancer which has further impacted her symptoms. Has found it is now starting to impact her left knee and cause pain here. Had a hard time bending and straightening left knee for a few days, but now better. Hard time getting in and out of bed, chair, car. Pain wakes her up in the middle of the night. Cannot stand or walk >15 minutes due to pain. When cooking and cleaning she has to stop and sit down. Finds she can no longer do as much as she'd like around the house.     Precautions: None   Pain: 6/10      Objective   ROM   Lumbar spine AROM:  Flex 25%  Ext neutral  Lat flex L/R 25%  Rot L/R 25%   (Pain with all motions, more limitations to left with rotation)    Left knee AROM is WNL      MMT   Bilat LE strength:  Hip flex 4-/5   Knee flex 4/5  Knee ext 4/5  (Pain with all on left)      Palpation   TTP left patella and medial knee joint line.  TTP left distal hamstring  TTP lumbar vertebrae and paraspinals.     No edema      Flexibility   Moderate tightness in bilateral hip flexors    Special Tests   SLUMP: Negative, bilaterally  Patella Grind: Positive, left     Transfers   Heavy bilateral UE support from normal chair    Gait   Ambulates with decreased gail and antalgic trendelenberg  gait.   Lateral trunk flexion to right to off-weight left LE. Oil Trough ANNELISE with lateral sway. Decreased stance time on left    Stairs   Ascends and descends stairs with step-to-pattern using 1 rail      Outcome Measures:  Low Back Disability / Oswestry: 27/50    Treatments:  Therapeutic Exercise:  SKTC with towel  PPT  LTR  SLR - low level    Therapeutic activity:  Educated pt on eval findings and POC  Educated pt on anatomy of spine  Discussed adding a daily walk into routine to help with symptoms    Assessment   Assessment:   Pt is a 73 y.o. female with lumbar stenosis with acute left knee pain as a result of compensations. Pt with decreased ROM, reduced strength, gait deficits, abnormal posture, and flexibility limitations. Pt will benefit from skilled PT to address the above deficits for improvement in functional activities.       Moderate complexity due to patient's clinical presentation being evolving with changing characteristics, with comorbidities/complexities to include cancer, OA, all of which may negatively impact rehab tolerance and progression.     Plan:   Treatment/Interventions: Education/ Instruction, Gait training, Manual therapy, Neuromuscular re-education, Therapeutic activities, Therapeutic exercises, Self care/ home management  PT Plan: Skilled PT  PT Frequency: 1 time per week  Duration: 5 more visits  Number of Treatments Authorized: [place] visits  Rehab Potential: Fair  Plan of Care Agreement: Patient      Goals:   Active       Mobility       Goal 1       Start:  05/08/24    Expected End:  08/06/24       Pt will improve lumbar spine AROM to WNL to improve I/ADLs.         Goal 2       Start:  05/08/24    Expected End:  08/06/24       Pt will improve LE strength to 4+/5 to improve I/ADLs.            Pain       Goal 1       Start:  05/08/24    Expected End:  08/06/24       Pt will perform household and cooking tasks with <3/10 pain         Goal 2       Start:  05/08/24    Expected End:  08/06/24        Pt will stand/walk >30 min with <3/10 pain to improve I/ADLs.

## 2024-05-14 ENCOUNTER — TREATMENT (OUTPATIENT)
Dept: PHYSICAL THERAPY | Facility: CLINIC | Age: 74
End: 2024-05-14
Payer: MEDICARE

## 2024-05-14 DIAGNOSIS — M47.816 LUMBAR SPONDYLOSIS: ICD-10-CM

## 2024-05-14 PROCEDURE — 97110 THERAPEUTIC EXERCISES: CPT | Mod: GP | Performed by: PHYSICAL THERAPIST

## 2024-05-14 NOTE — PROGRESS NOTES
"Physical Therapy Treatment    Patient Name: Teresa Max  MRN: [id number]  Today's Date: 5/14/2024  Time Calculation  Start Time: 1110  Stop Time: 1150  Time Calculation (min): 40 min  PT Therapeutic Procedures Time Entry  Therapeutic Exercise Time Entry: 40    Insurance:  Visit number: 2 of 6  Authorization info: [place] Visits   Insurance Type: Payor: MEDICARE / Plan: MEDICARE PART A AND B / Product Type: *No Product type* /     Current Problem   1. Lumbar spondylosis  Follow Up In Physical Therapy          Subjective   General   Pt feels the low back and left knee are feeling about the same. Had a hard time moving at all yesterday. Today better than yesterday.       Precautions: None   Pain 6/10  Post Treatment Pain Level \"same\"    Objective   Bilateral anterior pelvic tilt in standing    Treatments:  Therapeutic Exercise:  NuStep L5 x6 minutes  Gastroc stretch at wedge, 30 seconds x 3  Stand hamstring stretch, 30 seconds x 3 ea R/L   Stand hip ABD 2x10 ea R/L  Stand ham curl 2x10   LAQ, 2x10 ea R/L  Seated hip flexion, 2x10   SKTC, 30 seconds x 3 ea R/L  LTR, 2x10   SLR, 2x10 ea R/L   ABDOM brace, 2x10       Assessment   Assessment:   Pt tolerated there ex progressions without increases in pain/symptoms. Several cues required for all exercises to ensure correct performance and posture throughout.       Plan: Gentle stretching and strength    OP EDUCATION: importance of performing HEP    Goals: see nu, awaiting signature   "

## 2024-05-29 ENCOUNTER — TREATMENT (OUTPATIENT)
Dept: PHYSICAL THERAPY | Facility: CLINIC | Age: 74
End: 2024-05-29
Payer: MEDICARE

## 2024-05-29 DIAGNOSIS — M47.816 LUMBAR SPONDYLOSIS: ICD-10-CM

## 2024-05-29 PROCEDURE — 97110 THERAPEUTIC EXERCISES: CPT | Mod: GP,CQ

## 2024-05-29 NOTE — PROGRESS NOTES
Physical Therapy Treatment    Patient Name: Teresa Max  MRN: [id number]  Today's Date: 5/29/2024  Time Calculation  Start Time: 1015  Stop Time: 1055  Time Calculation (min): 40 min  PT Therapeutic Procedures Time Entry  Therapeutic Exercise Time Entry: 40    Insurance:  Visit number: 3 of 6  Authorization info: MEDICARE A/B - NO AUTH / [place] VISITS / $0 USED 2024 PT/ST.  2) [employer]  SUPP Plan G / 5/7/24  Insurance Type: Payor: MEDICARE / Plan: MEDICARE PART A AND B / Product Type: *No Product type* /   MEDICARE A/B - NO AUTH / [place] / [place]0 USED 2024 PT/ST.  2) [employer]  SUPP Plan G / 5/7/24  Current Problem   1. Lumbar spondylosis  Follow Up In Physical Therapy          Subjective   General    Pt claims she feels about the same  Precautions:   none  Pain    Back: 8/10  L knee: 8/10  Post Treatment Pain Level better    Objective   + Tomer on the L  -4/5 glute med L  -4/5 hip extension  + valgus on L  +quad lag L  Treatments:  Therapeutic Exercise:   Abd brace  Abd brace with MIP  Bridges  Hip ABD   Hip ADD   Hip ADD with SAQ   LTR    Assessment   Assessment:    Pt tolerated session fairly well.     Plan:    Progress as able with POC    OP EDUCATION:   Pt told to stay away from deep knee flexion D/T meniscal involvement.    Goals:

## 2024-06-14 ENCOUNTER — TREATMENT (OUTPATIENT)
Dept: PHYSICAL THERAPY | Facility: CLINIC | Age: 74
End: 2024-06-14
Payer: MEDICARE

## 2024-06-14 DIAGNOSIS — M47.816 LUMBAR SPONDYLOSIS: ICD-10-CM

## 2024-06-14 PROCEDURE — 97110 THERAPEUTIC EXERCISES: CPT | Mod: GP,CQ

## 2024-06-14 ASSESSMENT — PAIN SCALES - GENERAL: PAINLEVEL_OUTOF10: 7

## 2024-06-14 ASSESSMENT — PAIN - FUNCTIONAL ASSESSMENT: PAIN_FUNCTIONAL_ASSESSMENT: 0-10

## 2024-06-14 NOTE — PROGRESS NOTES
Physical Therapy Treatment    Patient Name: Teresa Max  MRN: [id number]  Today's Date: 6/14/2024  Time Calculation  Start Time: 0905  Stop Time: 0928  Time Calculation (min): 23 min  PT Therapeutic Procedures Time Entry  Therapeutic Exercise Time Entry: 23    Insurance:  Visit number: 4 of 6  Authorization info: MEDICARE A/B - NO AUTH / [place]   Insurance Type: Payor: MEDICARE / Plan: MEDICARE PART A AND B / Product Type: *No Product type* /     Current Problem   1. Lumbar spondylosis  Follow Up In Physical Therapy          Subjective   General    Patient 20 minutes late to appt, states she still wants to be seen. Therapist able to accommodate. States she has been compliant with exercises last therapist gave her.     Precautions:   Meniscal precautions    Pain   Pain Assessment: 0-10  Pain Score: 7  Pain Location: Back    Post Treatment Pain Level  7/10    Objective   Limited hip abd/ER ROM with weakness present on R    Valsalva observed     Poor lumbopelvic rhythm     Treatments:  Therapeutic Exercise:  Therapeutic Exercise  Therapeutic Exercise Performed: Yes  Therapeutic Exercise Activity 1: PPT with MIP and TrA engage 10x2 alt  Therapeutic Exercise Activity 2: ISO hip ADD bridges 10x3  Therapeutic Exercise Activity 3: S/l clamshells B 10x2  Therapeutic Exercise Activity 4: PPT with ISO hip ADD 10x2      Assessment   Assessment:    Patient session limited d/t time constraint, fair tolerance to interventions noted but very limited pelvic mobility observed with weakness to deep hip rotators and abductors with lumbar paraspinal tightness. Will progress next session.    Plan:    Core strength, hip strength, lumbopelvic rhythm    OP EDUCATION:   Review/updated HEP    Goals:       Active         Mobility         Goal 1         Start:  05/08/24    Expected End:  08/06/24        Pt will improve lumbar spine AROM to WNL to improve I/ADLs.           Goal 2         Start:  05/08/24    Expected End:  08/06/24        Pt  will improve LE strength to 4+/5 to improve I/ADLs.              Pain         Goal 1         Start:  05/08/24    Expected End:  08/06/24        Pt will perform household and cooking tasks with <3/10 pain           Goal 2         Start:  05/08/24    Expected End:  08/06/24        Pt will stand/walk >30 min with <3/10 pain to improve I/ADLs.

## 2024-06-18 ENCOUNTER — TREATMENT (OUTPATIENT)
Dept: PHYSICAL THERAPY | Facility: CLINIC | Age: 74
End: 2024-06-18
Payer: MEDICARE

## 2024-06-18 DIAGNOSIS — M47.816 LUMBAR SPONDYLOSIS: ICD-10-CM

## 2024-06-18 PROCEDURE — 97110 THERAPEUTIC EXERCISES: CPT | Mod: GP,CQ

## 2024-06-18 ASSESSMENT — PAIN SCALES - GENERAL: PAINLEVEL_OUTOF10: 6

## 2024-06-18 ASSESSMENT — PAIN - FUNCTIONAL ASSESSMENT: PAIN_FUNCTIONAL_ASSESSMENT: 0-10

## 2024-06-18 ASSESSMENT — PAIN DESCRIPTION - DESCRIPTORS: DESCRIPTORS: ACHING

## 2024-06-18 NOTE — PROGRESS NOTES
"Physical Therapy Treatment    Patient Name: Teresa Max  MRN: [id number]  Today's Date: 6/18/2024  Time Calculation  Start Time: 1000  Stop Time: 1038  Time Calculation (min): 38 min  PT Therapeutic Procedures Time Entry  Therapeutic Exercise Time Entry: 38    Insurance:  Visit number: 5 of 6  Authorization info: MEDICARE A/B - NO AUTH / [place]   Insurance Type: Payor: MEDICARE / Plan: MEDICARE PART A AND B / Product Type: *No Product type* /     Current Problem   1. Lumbar spondylosis  Follow Up In Physical Therapy          Subjective   General    Patient reports she feels the same as last session, reports continuous back pain which is at its worst in the mornings, then throughout the day pain dissipates. Patient reports she sleeps on R side at night. Increased pain with bending over and static standing.      Precautions:   none    Pain   Pain Assessment: 0-10  Pain Score: 6  Pain Type: Chronic pain  Pain Location: Back  Pain Orientation: Right  Pain Descriptors: Aching  Pain Frequency: Constant/continuous    Post Treatment Pain Level   5/10    Objective   TTP to thoracic and lumbar paraspinals upon palpation     Standing posture: elevated L shoulder with R slight side bend, flexed forward posture with rounded shoulders    Treatments:  Therapeutic Exercise:  Therapeutic Exercise  Therapeutic Exercise Performed: Yes  Therapeutic Exercise Activity 1: NuStep level 5 (LEs only)  Therapeutic Exercise Activity 2: hooklying piriformis stretch B 3 x 30\"  Therapeutic Exercise Activity 3: PPT with ISO hip ADD 10x2 (nick breathing)  Therapeutic Exercise Activity 4: alt iso hip flex with nick breathing 10x2  Therapeutic Exercise Activity 5: s/l clamshells B 10x2  Therapeutic Exercise Activity 6: standing scap retract OTB with nick breathing and TrA engage 10x2  Therapeutic Exercise Activity 7: standing amb press downs red SB nick breathing 10x2      Assessment   Assessment:    Patient tolerated full session fairly well, " however continues to have a lot of trouble with diaphragmatic breathing. Presented with visual and verbal cues for proper breathing with little/no carry over, instead performing most therex with valsalva maneuver with c/o feeling lightheaded. Poor lumbopelvic rhythm, poor TrA contraction, postural deficits, and limited hip ABD/ER Rom and strength. Will benefit from further skilled therapy to reduce deficits and improve functional strength/mobility.    Plan:    Continue per pt poc, reducing deficits and relief of pain    OP EDUCATION:   Diaphragmatic breathing with functional tasks.     Goals:       Active         Mobility         Goal 1         Start:  05/08/24    Expected End:  08/06/24        Pt will improve lumbar spine AROM to WNL to improve I/ADLs.           Goal 2         Start:  05/08/24    Expected End:  08/06/24        Pt will improve LE strength to 4+/5 to improve I/ADLs.              Pain         Goal 1         Start:  05/08/24    Expected End:  08/06/24        Pt will perform household and cooking tasks with <3/10 pain           Goal 2         Start:  05/08/24    Expected End:  08/06/24        Pt will stand/walk >30 min with <3/10 pain to improve I/ADLs.

## 2024-06-25 ENCOUNTER — TREATMENT (OUTPATIENT)
Dept: PHYSICAL THERAPY | Facility: CLINIC | Age: 74
End: 2024-06-25
Payer: MEDICARE

## 2024-06-25 DIAGNOSIS — M47.816 LUMBAR SPONDYLOSIS: ICD-10-CM

## 2024-06-25 PROCEDURE — 97110 THERAPEUTIC EXERCISES: CPT | Mod: GP | Performed by: PHYSICAL THERAPIST

## 2024-06-25 NOTE — PROGRESS NOTES
"Physical Therapy Treatment/Discharge Report    Patient Name: Teresa Max  MRN: [id number]  Today's Date: 6/25/2024  Time Calculation  Start Time: 0925  Stop Time: 1005  Time Calculation (min): 40 min  PT Therapeutic Procedures Time Entry  Therapeutic Exercise Time Entry: 40    Insurance:  Visit number: 6 of 6  Authorization info: [place] Visits   Insurance Type: Payor: MEDICARE / Plan: MEDICARE PART A AND B / Product Type: *No Product type* /     Current Problem   1. Lumbar spondylosis  Follow Up In Physical Therapy          Subjective   General   Low back is getting better, only having some symptoms in the morning. Feels muscles in legs and left knee to be tight.       Precautions: None  Pain 6/10  Post Treatment Pain Level \"gets better\"    Objective   Lumbar spine AROM:  Flex 90%  Ext 75%  Lat flex L/R 100%  Rot L/R 100%    Left knee AROM: WNL    Bilateral LE strength:  Hip flex 4/5  Knee flex 4+/5  Knee ext 4+/5    Flexibility: Mild tightness in bilateral hamstrings, L>R, increased distally  Gait: Ambulates without deficits     Treatments:  NuStep L5 x6 minutes  Gastroc stretch at wedge, 30 seconds x 3  Stand hamstring stretch, 30 seconds x 3 L  Red physio push down, 2x10   Side steps green ankles 20 feet x 4  Zig zags green ankles 20 feet x 4   ABDOM brace with MIP, 2x10 ea R/L  Bridge, 2x10  Clamshells, 2x10 ea R/L      Assessment   Assessment:   Pt with slow progress during therapy and goals remain partially met. Pt with improved gait and AROM, however remains with mild LE and abdominal strength deficits. Difficult time fully engaging core musculature. Able to perform increased activities around the house and with her grandchildren. Pt feels ready to complete exercises and home program on her own. She is to be discharged in good understanding.     Plan: Discharge    OP EDUCATION: Cont with HEP on own.    Goals: Partially met  "

## 2024-08-17 ENCOUNTER — APPOINTMENT (OUTPATIENT)
Dept: RADIOLOGY | Facility: HOSPITAL | Age: 74
End: 2024-08-17
Payer: MEDICARE

## 2024-08-17 ENCOUNTER — HOSPITAL ENCOUNTER (INPATIENT)
Facility: HOSPITAL | Age: 74
End: 2024-08-17
Attending: EMERGENCY MEDICINE | Admitting: INTERNAL MEDICINE
Payer: MEDICARE

## 2024-08-17 ENCOUNTER — APPOINTMENT (OUTPATIENT)
Dept: CARDIOLOGY | Facility: HOSPITAL | Age: 74
End: 2024-08-17
Payer: MEDICARE

## 2024-08-17 DIAGNOSIS — J18.9 PNEUMONIA DUE TO INFECTIOUS ORGANISM, UNSPECIFIED LATERALITY, UNSPECIFIED PART OF LUNG: ICD-10-CM

## 2024-08-17 DIAGNOSIS — J69.0 ASPIRATION PNEUMONIA OF BOTH LOWER LOBES DUE TO REGURGITATED FOOD (MULTI): Primary | ICD-10-CM

## 2024-08-17 DIAGNOSIS — J96.01 ACUTE HYPOXIC RESPIRATORY FAILURE (MULTI): ICD-10-CM

## 2024-08-17 LAB
ALBUMIN SERPL-MCNC: 4.4 G/DL (ref 3.5–5)
ALP BLD-CCNC: 94 U/L (ref 35–125)
ALT SERPL-CCNC: 48 U/L (ref 5–40)
ANION GAP SERPL CALC-SCNC: 14 MMOL/L
APPEARANCE UR: CLEAR
AST SERPL-CCNC: 38 U/L (ref 5–40)
BASOPHILS # BLD AUTO: 0.02 X10*3/UL (ref 0–0.1)
BASOPHILS NFR BLD AUTO: 0.3 %
BILIRUB SERPL-MCNC: 0.5 MG/DL (ref 0.1–1.2)
BILIRUB UR STRIP.AUTO-MCNC: NEGATIVE MG/DL
BUN SERPL-MCNC: 11 MG/DL (ref 8–25)
CALCIUM SERPL-MCNC: 9.8 MG/DL (ref 8.5–10.4)
CHLORIDE SERPL-SCNC: 99 MMOL/L (ref 97–107)
CO2 SERPL-SCNC: 25 MMOL/L (ref 24–31)
COLOR UR: COLORLESS
CREAT SERPL-MCNC: 0.7 MG/DL (ref 0.4–1.6)
EGFRCR SERPLBLD CKD-EPI 2021: >90 ML/MIN/1.73M*2
EOSINOPHIL # BLD AUTO: 0.01 X10*3/UL (ref 0–0.4)
EOSINOPHIL NFR BLD AUTO: 0.1 %
ERYTHROCYTE [DISTWIDTH] IN BLOOD BY AUTOMATED COUNT: 13 % (ref 11.5–14.5)
GLUCOSE SERPL-MCNC: 115 MG/DL (ref 65–99)
GLUCOSE UR STRIP.AUTO-MCNC: NORMAL MG/DL
HCT VFR BLD AUTO: 46.4 % (ref 36–46)
HGB BLD-MCNC: 15.2 G/DL (ref 12–16)
IMM GRANULOCYTES # BLD AUTO: 0.02 X10*3/UL (ref 0–0.5)
IMM GRANULOCYTES NFR BLD AUTO: 0.3 % (ref 0–0.9)
KETONES UR STRIP.AUTO-MCNC: NEGATIVE MG/DL
LACTATE BLDV-SCNC: 1.8 MMOL/L (ref 0.4–2)
LACTATE BLDV-SCNC: 2.7 MMOL/L (ref 0.4–2)
LEUKOCYTE ESTERASE UR QL STRIP.AUTO: NEGATIVE
LYMPHOCYTES # BLD AUTO: 0.73 X10*3/UL (ref 0.8–3)
LYMPHOCYTES NFR BLD AUTO: 10 %
MAGNESIUM SERPL-MCNC: 1.8 MG/DL (ref 1.6–3.1)
MCH RBC QN AUTO: 27.5 PG (ref 26–34)
MCHC RBC AUTO-ENTMCNC: 32.8 G/DL (ref 32–36)
MCV RBC AUTO: 84 FL (ref 80–100)
MONOCYTES # BLD AUTO: 0.23 X10*3/UL (ref 0.05–0.8)
MONOCYTES NFR BLD AUTO: 3.1 %
MUCOUS THREADS #/AREA URNS AUTO: NORMAL /LPF
NEUTROPHILS # BLD AUTO: 6.31 X10*3/UL (ref 1.6–5.5)
NEUTROPHILS NFR BLD AUTO: 86.2 %
NITRITE UR QL STRIP.AUTO: NEGATIVE
NRBC BLD-RTO: 0 /100 WBCS (ref 0–0)
NT-PROBNP SERPL-MCNC: 152 PG/ML (ref 0–353)
PH UR STRIP.AUTO: 7 [PH]
PLATELET # BLD AUTO: 172 X10*3/UL (ref 150–450)
POTASSIUM SERPL-SCNC: 3.8 MMOL/L (ref 3.4–5.1)
PROT SERPL-MCNC: 8.1 G/DL (ref 5.9–7.9)
PROT UR STRIP.AUTO-MCNC: ABNORMAL MG/DL
RBC # BLD AUTO: 5.52 X10*6/UL (ref 4–5.2)
RBC # UR STRIP.AUTO: NEGATIVE /UL
RBC #/AREA URNS AUTO: NORMAL /HPF
SARS-COV-2 RNA RESP QL NAA+PROBE: NOT DETECTED
SODIUM SERPL-SCNC: 138 MMOL/L (ref 133–145)
SP GR UR STRIP.AUTO: 1.01
TROPONIN T SERPL-MCNC: 11 NG/L
TROPONIN T SERPL-MCNC: 11 NG/L
TROPONIN T SERPL-MCNC: 9 NG/L
TSH SERPL DL<=0.05 MIU/L-ACNC: 0.64 MIU/L (ref 0.27–4.2)
UROBILINOGEN UR STRIP.AUTO-MCNC: NORMAL MG/DL
WBC # BLD AUTO: 7.3 X10*3/UL (ref 4.4–11.3)
WBC #/AREA URNS AUTO: NORMAL /HPF

## 2024-08-17 PROCEDURE — 80053 COMPREHEN METABOLIC PANEL: CPT | Performed by: EMERGENCY MEDICINE

## 2024-08-17 PROCEDURE — 83735 ASSAY OF MAGNESIUM: CPT

## 2024-08-17 PROCEDURE — 84484 ASSAY OF TROPONIN QUANT: CPT | Performed by: EMERGENCY MEDICINE

## 2024-08-17 PROCEDURE — 2500000004 HC RX 250 GENERAL PHARMACY W/ HCPCS (ALT 636 FOR OP/ED): Performed by: EMERGENCY MEDICINE

## 2024-08-17 PROCEDURE — 87040 BLOOD CULTURE FOR BACTERIA: CPT | Mod: WESLAB | Performed by: EMERGENCY MEDICINE

## 2024-08-17 PROCEDURE — 84443 ASSAY THYROID STIM HORMONE: CPT | Performed by: EMERGENCY MEDICINE

## 2024-08-17 PROCEDURE — 99285 EMERGENCY DEPT VISIT HI MDM: CPT | Mod: 25

## 2024-08-17 PROCEDURE — 83605 ASSAY OF LACTIC ACID: CPT | Performed by: EMERGENCY MEDICINE

## 2024-08-17 PROCEDURE — 71275 CT ANGIOGRAPHY CHEST: CPT

## 2024-08-17 PROCEDURE — 2060000001 HC INTERMEDIATE ICU ROOM DAILY

## 2024-08-17 PROCEDURE — 96365 THER/PROPH/DIAG IV INF INIT: CPT

## 2024-08-17 PROCEDURE — 2500000001 HC RX 250 WO HCPCS SELF ADMINISTERED DRUGS (ALT 637 FOR MEDICARE OP): Performed by: NURSE PRACTITIONER

## 2024-08-17 PROCEDURE — 87635 SARS-COV-2 COVID-19 AMP PRB: CPT | Performed by: EMERGENCY MEDICINE

## 2024-08-17 PROCEDURE — 36415 COLL VENOUS BLD VENIPUNCTURE: CPT | Performed by: EMERGENCY MEDICINE

## 2024-08-17 PROCEDURE — 85025 COMPLETE CBC W/AUTO DIFF WBC: CPT | Performed by: EMERGENCY MEDICINE

## 2024-08-17 PROCEDURE — 83880 ASSAY OF NATRIURETIC PEPTIDE: CPT | Performed by: EMERGENCY MEDICINE

## 2024-08-17 PROCEDURE — 81001 URINALYSIS AUTO W/SCOPE: CPT | Performed by: EMERGENCY MEDICINE

## 2024-08-17 PROCEDURE — 93005 ELECTROCARDIOGRAM TRACING: CPT

## 2024-08-17 PROCEDURE — 71045 X-RAY EXAM CHEST 1 VIEW: CPT | Performed by: RADIOLOGY

## 2024-08-17 PROCEDURE — 2500000004 HC RX 250 GENERAL PHARMACY W/ HCPCS (ALT 636 FOR OP/ED): Performed by: NURSE PRACTITIONER

## 2024-08-17 PROCEDURE — 93010 ELECTROCARDIOGRAM REPORT: CPT | Performed by: INTERNAL MEDICINE

## 2024-08-17 PROCEDURE — 71045 X-RAY EXAM CHEST 1 VIEW: CPT

## 2024-08-17 PROCEDURE — 2550000001 HC RX 255 CONTRASTS: Performed by: EMERGENCY MEDICINE

## 2024-08-17 PROCEDURE — 99223 1ST HOSP IP/OBS HIGH 75: CPT | Performed by: NURSE PRACTITIONER

## 2024-08-17 PROCEDURE — 71275 CT ANGIOGRAPHY CHEST: CPT | Performed by: RADIOLOGY

## 2024-08-17 PROCEDURE — 2500000004 HC RX 250 GENERAL PHARMACY W/ HCPCS (ALT 636 FOR OP/ED)

## 2024-08-17 RX ORDER — ONDANSETRON 4 MG/1
4 TABLET, FILM COATED ORAL EVERY 8 HOURS PRN
Status: DISCONTINUED | OUTPATIENT
Start: 2024-08-17 | End: 2024-08-23 | Stop reason: HOSPADM

## 2024-08-17 RX ORDER — IBUPROFEN 400 MG/1
400 TABLET ORAL EVERY 4 HOURS PRN
Status: DISCONTINUED | OUTPATIENT
Start: 2024-08-17 | End: 2024-08-23 | Stop reason: HOSPADM

## 2024-08-17 RX ORDER — PRAVASTATIN SODIUM 40 MG/1
80 TABLET ORAL NIGHTLY
Status: DISCONTINUED | OUTPATIENT
Start: 2024-08-17 | End: 2024-08-23 | Stop reason: HOSPADM

## 2024-08-17 RX ORDER — ONDANSETRON HYDROCHLORIDE 2 MG/ML
4 INJECTION, SOLUTION INTRAVENOUS EVERY 8 HOURS PRN
Status: DISCONTINUED | OUTPATIENT
Start: 2024-08-17 | End: 2024-08-23 | Stop reason: HOSPADM

## 2024-08-17 RX ORDER — ASPIRIN 81 MG/1
81 TABLET ORAL DAILY
Status: DISCONTINUED | OUTPATIENT
Start: 2024-08-17 | End: 2024-08-23 | Stop reason: HOSPADM

## 2024-08-17 RX ORDER — POLYETHYLENE GLYCOL 3350 17 G/17G
17 POWDER, FOR SOLUTION ORAL DAILY PRN
Status: DISCONTINUED | OUTPATIENT
Start: 2024-08-17 | End: 2024-08-23 | Stop reason: HOSPADM

## 2024-08-17 RX ORDER — PANTOPRAZOLE SODIUM 40 MG/1
40 TABLET, DELAYED RELEASE ORAL
Status: DISCONTINUED | OUTPATIENT
Start: 2024-08-18 | End: 2024-08-23 | Stop reason: HOSPADM

## 2024-08-17 RX ORDER — AMLODIPINE BESYLATE 5 MG/1
5 TABLET ORAL DAILY
Status: DISCONTINUED | OUTPATIENT
Start: 2024-08-17 | End: 2024-08-18

## 2024-08-17 RX ORDER — SODIUM CHLORIDE 9 MG/ML
100 INJECTION, SOLUTION INTRAVENOUS CONTINUOUS
Status: DISCONTINUED | OUTPATIENT
Start: 2024-08-17 | End: 2024-08-23 | Stop reason: HOSPADM

## 2024-08-17 RX ORDER — TRAMADOL HYDROCHLORIDE 50 MG/1
50 TABLET ORAL EVERY 6 HOURS PRN
Status: DISCONTINUED | OUTPATIENT
Start: 2024-08-17 | End: 2024-08-23 | Stop reason: HOSPADM

## 2024-08-17 RX ORDER — ENOXAPARIN SODIUM 100 MG/ML
40 INJECTION SUBCUTANEOUS EVERY 24 HOURS
Status: DISCONTINUED | OUTPATIENT
Start: 2024-08-17 | End: 2024-08-23 | Stop reason: HOSPADM

## 2024-08-17 ASSESSMENT — LIFESTYLE VARIABLES
HAVE YOU EVER FELT YOU SHOULD CUT DOWN ON YOUR DRINKING: NO
HAVE PEOPLE ANNOYED YOU BY CRITICIZING YOUR DRINKING: NO
TOTAL SCORE: 0
EVER HAD A DRINK FIRST THING IN THE MORNING TO STEADY YOUR NERVES TO GET RID OF A HANGOVER: NO
EVER FELT BAD OR GUILTY ABOUT YOUR DRINKING: NO

## 2024-08-17 ASSESSMENT — COLUMBIA-SUICIDE SEVERITY RATING SCALE - C-SSRS
6. HAVE YOU EVER DONE ANYTHING, STARTED TO DO ANYTHING, OR PREPARED TO DO ANYTHING TO END YOUR LIFE?: NO
2. HAVE YOU ACTUALLY HAD ANY THOUGHTS OF KILLING YOURSELF?: NO
1. IN THE PAST MONTH, HAVE YOU WISHED YOU WERE DEAD OR WISHED YOU COULD GO TO SLEEP AND NOT WAKE UP?: NO

## 2024-08-17 ASSESSMENT — COGNITIVE AND FUNCTIONAL STATUS - GENERAL
MOBILITY SCORE: 22
WALKING IN HOSPITAL ROOM: A LITTLE
DAILY ACTIVITIY SCORE: 24
CLIMB 3 TO 5 STEPS WITH RAILING: A LITTLE
PATIENT BASELINE BEDBOUND: NO

## 2024-08-17 ASSESSMENT — PAIN - FUNCTIONAL ASSESSMENT
PAIN_FUNCTIONAL_ASSESSMENT: 0-10
PAIN_FUNCTIONAL_ASSESSMENT: 0-10

## 2024-08-17 ASSESSMENT — ENCOUNTER SYMPTOMS
MUSCULOSKELETAL NEGATIVE: 1
VOMITING: 1
SHORTNESS OF BREATH: 1
NAUSEA: 1
EYES NEGATIVE: 1
CHOKING: 1
CARDIOVASCULAR NEGATIVE: 1
COUGH: 1
ENDOCRINE NEGATIVE: 1
ALLERGIC/IMMUNOLOGIC NEGATIVE: 1
PSYCHIATRIC NEGATIVE: 1
DIZZINESS: 1
HEMATOLOGIC/LYMPHATIC NEGATIVE: 1
FEVER: 1
CHILLS: 1

## 2024-08-17 ASSESSMENT — PAIN DESCRIPTION - PAIN TYPE: TYPE: ACUTE PAIN

## 2024-08-17 ASSESSMENT — PAIN SCALES - GENERAL
PAINLEVEL_OUTOF10: 7
PAINLEVEL_OUTOF10: 0 - NO PAIN

## 2024-08-17 ASSESSMENT — PAIN DESCRIPTION - LOCATION: LOCATION: THROAT

## 2024-08-17 NOTE — PROGRESS NOTES
Attestation/Supervisory note for SONALI Delarosa      The patient is a 73-year-old female presenting to the emergency department for evaluation of shortness of breath.  The patient states that she had a choking episode while eating pizza around 1300 today.  She states that she just has felt off since then.  She states that she does have some some substernal chest pressure/discomfort but she believes it is from choking episode.  She states that she has felt a little short of breath.  She denies any headache or visual changes.  She denies any fevers but she has felt a little cold today.  No sick contacts or recent travel.  No abdominal pain.  No nausea or vomiting.  No diarrhea or constipation.  No urinary complaints.  No focal weakness or numbness.  No history of CAD or ACS.  No history of PE or DVT.  She does not smoke or drink.  She does have a history of hypertension and GERD.  No history of diabetes.  She does have a history of hyperlipidemia.  All pertinent positives and negatives are recorded above.  All other systems reviewed and otherwise negative.  Vital signs with a fever, tachycardia, hypertension and hypoxia but otherwise within normal limits.  Physical exam with a well-nourished well-developed female in no acute distress.  HEENT exam with dry mucous membranes but otherwise unremarkable.  She has no evidence of airway compromise or respiratory distress at this time but she is tachycardic..  She is able to converse without difficulty.  Abdominal exam is benign.  She does not have any gross motor, neurologic or vascular deficits on exam.  Pulses are equal bilaterally.      EKG with sinus tachycardia 118 bpm, leftward axis, LVH criteria, normal ST segment, normal T waves      Oral acetaminophen, cultures, IV antibiotics and IV fluids ordered for the sepsis bundle and fever      Diagnostic labs with with mild lactic acidosis, transaminitis, but otherwise unremarkable.      Initial Troponin T 9.  Repeat troponin T  pending at the time of my departure.      Initial lactic acid 2.7.   repeat lactic pending at the time of my departure.      Heart score 2      CT angio chest for pulmonary embolism   Final Result   1.  No evidence of pulmonary embolus.   2. Extensive bilateral bilateral lung opacification in predominantly   perihilar distribution with patchy and nodular airspace opacities   extending to the bilateral lower lobes and right middle lobe.   3. Remaining intrathoracic findings appear stable since comparison   imaging 09/11/2023.             Signed by: Freddy Umanzor 8/17/2024 6:52 PM   Dictation workstation:   [id number]      XR chest 1 view   Final Result   Bibasilar patchy infiltrate or atelectasis.        MACRO:   None        Signed by: Deepak Weeks 8/17/2024 5:16 PM   Dictation workstation:   [id number]           The patient does not have any evidence of ischemia on EKG.  No events on telemetry other than sinus tachycardia that did improve with IV fluid rehydration.  CT chest shows no evidence of PE or dissection.  She does have extensive bilateral lung opacification consistent with possible pneumonia.  Chest x-ray showed bibasilar patchy infiltrates but no obvious consolidation.      SONALI Delarosa will continue to manage the patient primarily.  Anticipate disposition based on the status of the patient throughout her ED stay and the results of the repeat Troponin T and lactic acid.  If the patient has a normal repeat troponin T and lactic acid and is able to tolerate a trial of ambulation without desaturation, anticipate that the patient may be released to go home with close outpatient follow-up with her primary care physician and treatment with antibiotics.  If the patient does not improve and remains tachycardic while in the emergency room, anticipate that the patient will be admitted for further management of her bilateral pneumonia.      Impression/diagnosis:  1.  Dyspnea, dyspnea on exertion  2.  Fever  3.  Chest  pain, substernal  4.  Bilateral pneumonia      Critical care time of  25 minutes billed for management of the sepsis bundle with initiation of IV fluids and IV antibiotics, initiation of cultures, treatment with oral acetaminophen for her fever, monitoring the patient's telemetry, consultation with the patient regarding her results.  This time excludes time for billable procedures.      critical care time billed for by me is non concurrent with time billed for by SONALI Delarosa      I personally saw the patient and made/approve the management plan and take responsibility for the patient management.      I independently interpreted the following study (S) EKG and diagnostic labs      I personally discussed the patient's management with the patient      I reviewed the results of the diagnostic labs and diagnostic imaging.  Formal radiology read was completed by the radiologist.      Jocy Bernal MD

## 2024-08-17 NOTE — H&P
"Chief complaint: Choking    History Of Present Illness  Teresa Max is a 73 y.o. female with a past medical history of hypertension, GERD, hyperlipidemia, and history of lung cancer with radiation treatment and has been in remission for 8 years. Patient presents to the ED from home after a choking episode while eating pizza around 1300 today.  She states that during her choking episode she was short of breath, dizziness which resulted in her falling down, nausea, with vomiting. Denies any head strike. She states that \"I felt like I was going to die.\" States that she continued to having coughing episodes with dizziness which prompted her visit to the ED. She states that she has some upper thoracic chest pain due to coughing. Also endorses being cold and having a fever. Her  is at bedside and states that she has been having increasing choking episodes more often, but \"nothing like this.\" She is in no apparent distress at this time, able to speak in full sentences and using supplemental oxygen. Patient denies any trouble urinating, no abdominal pain. Denies diarrhea or constipation.       She presented to the ED with a pulse ox of 88%, heart rate 123, and ill appearing. In the ED labs revealed a blood glucose of 115, sodium 138, potassium 3.8, creatinine 0.70, GFR >90, ALT 48, AST 38, WBC 7.3, hemoglobin 15.2, hematocrit 46.4, platelets 172, initial lactate 2.7. Initial troponin was 9. UA was negative. COVID negative. EKG with sinus tachycardia 118. Chest xray revealed Bibasilar patchy infiltrate or atelectasis. CT angio was negative with No evidence of pulmonary embolus. Extensive bilateral bilateral lung opacification in predominantly perihilar distribution with patchy and nodular airspace opacities  extending to the bilateral lower lobes and right middle lobe.  Patient was given IV Zosyn and IV fluids in the ED. Patient states that the coughing has stopped and she is feeling a little bit better.      Past " Medical History  Past Medical History:   Diagnosis Date    Breast cancer (Multi)     rt breast cancer with lumpectomy and radiation    Combined forms of age-related cataract of left eye     Disorder of refraction     Eyelid inflammation     Intraductal carcinoma in situ of unspecified breast 02/19/2018    DCIS (ductal carcinoma in situ) of breast    Malignant neoplasm of unspecified part of unspecified bronchus or lung (Multi) 02/17/2016    Non-small cell lung cancer (NSCLC)    Open angle with borderline findings, low risk, bilateral     Personal history of irradiation     Pseudophakia        Surgical History  Past Surgical History:   Procedure Laterality Date    BREAST LUMPECTOMY  02/17/2016    Breast Surgery Lumpectomy    CATARACT EXTRACTION Right     2016 Dr Barry    COLONOSCOPY      polypectomy    CT GUIDED IMAGING FOR NEEDLE PLACEMENT  05/22/2015    CT GUIDED IMAGING FOR NEEDLE PLACEMENT [facility]    INTRAOCULAR LENS INSERTION      OTHER SURGICAL HISTORY  02/17/2016    Biopsy Lung Percutaneous    OTHER SURGICAL HISTORY  07/21/2021    Tonsillectomy with adenoidectomy    OTHER SURGICAL HISTORY  02/16/2018    Radiation Therapy    UPPER GASTROINTESTINAL ENDOSCOPY  2023    GERD - Dr. Germaine Liriano    US GUIDED THYROID BIOPSY  11/07/2023    US GUIDED THYROID BIOPSY 11/7/2023 CAT US        Social History  She reports that she has never smoked. She has never used smokeless tobacco. She reports current alcohol use. She reports that she does not use drugs.    Family History  Family History   Problem Relation Name Age of Onset    Other (brain tumor) Mother      Cancer Mother          Allergies  Acetaminophen, Azithromycin, Cpm-pseudoephed-acetaminophen, Guaifenesin, Hydrocodone, Vgyqxowcn-rd-txtxsfdz-guaifen, and Ceftriaxone    Review of Systems   Constitutional:  Positive for chills and fever.   Eyes: Negative.    Respiratory:  Positive for cough, choking and shortness of breath.    Cardiovascular: Negative.   "  Gastrointestinal:  Positive for nausea and vomiting.   Endocrine: Negative.    Genitourinary: Negative.    Musculoskeletal: Negative.    Allergic/Immunologic: Negative.    Neurological:  Positive for dizziness.   Hematological: Negative.    Psychiatric/Behavioral: Negative.     All other systems reviewed and are negative.       Physical Exam  Vitals and nursing note reviewed.   Constitutional:       Appearance: She is ill-appearing.   HENT:      Head: Normocephalic and atraumatic.      Nose: Nose normal.      Mouth/Throat:      Mouth: Mucous membranes are dry.   Eyes:      Pupils: Pupils are equal, round, and reactive to light.   Cardiovascular:      Rate and Rhythm: Regular rhythm. Tachycardia present.      Pulses: Normal pulses.      Heart sounds: Normal heart sounds.   Pulmonary:      Effort: Pulmonary effort is normal.      Breath sounds: Normal breath sounds.   Abdominal:      General: Abdomen is flat. Bowel sounds are normal.      Palpations: Abdomen is soft.   Musculoskeletal:         General: Normal range of motion.   Skin:     General: Skin is warm and dry.      Capillary Refill: Capillary refill takes less than 2 seconds.   Neurological:      General: No focal deficit present.      Mental Status: She is alert and oriented to person, place, and time. Mental status is at baseline.   Psychiatric:         Mood and Affect: Mood normal.         Behavior: Behavior normal.          Last Recorded Vitals  Blood pressure 136/70, pulse (!) 112, temperature 37.7 °C (99.9 °F), resp. rate (!) 29, height 1.549 m (5' 1\"), weight 65.8 kg (145 lb), SpO2 (!) 93%.    Relevant Results    Scheduled medications  [Held by provider] amLODIPine, 5 mg, oral, Daily  aspirin, 81 mg, oral, Daily  enoxaparin, 40 mg, subcutaneous, q24h  [START ON 8/18/2024] pantoprazole, 40 mg, oral, Daily before breakfast  piperacillin-tazobactam, 4.5 g, intravenous, q6h  pravastatin, 80 mg, oral, Daily      Continuous medications  sodium chloride " 0.9%, 100 mL/hr      PRN medications  PRN medications: ibuprofen, ondansetron **OR** ondansetron, polyethylene glycol, traMADol      Results for orders placed or performed during the hospital encounter of 08/17/24 (from the past 24 hour(s))   CBC and Auto Differential   Result Value Ref Range    WBC 7.3 4.4 - 11.3 x10*3/uL    nRBC 0.0 0.0 - 0.0 /100 WBCs    RBC 5.52 (H) 4.00 - 5.20 x10*6/uL    Hemoglobin 15.2 12.0 - 16.0 g/dL    Hematocrit 46.4 (H) 36.0 - 46.0 %    MCV 84 80 - 100 fL    MCH 27.5 26.0 - 34.0 pg    MCHC 32.8 32.0 - 36.0 g/dL    RDW 13.0 11.5 - 14.5 %    Platelets 172 150 - 450 x10*3/uL    Neutrophils % 86.2 40.0 - 80.0 %    Immature Granulocytes %, Automated 0.3 0.0 - 0.9 %    Lymphocytes % 10.0 13.0 - 44.0 %    Monocytes % 3.1 2.0 - 10.0 %    Eosinophils % 0.1 0.0 - 6.0 %    Basophils % 0.3 0.0 - 2.0 %    Neutrophils Absolute 6.31 (H) 1.60 - 5.50 x10*3/uL    Immature Granulocytes Absolute, Automated 0.02 0.00 - 0.50 x10*3/uL    Lymphocytes Absolute 0.73 (L) 0.80 - 3.00 x10*3/uL    Monocytes Absolute 0.23 0.05 - 0.80 x10*3/uL    Eosinophils Absolute 0.01 0.00 - 0.40 x10*3/uL    Basophils Absolute 0.02 0.00 - 0.10 x10*3/uL   Comprehensive metabolic panel   Result Value Ref Range    Glucose 115 (H) 65 - 99 mg/dL    Sodium 138 133 - 145 mmol/L    Potassium 3.8 3.4 - 5.1 mmol/L    Chloride 99 97 - 107 mmol/L    Bicarbonate 25 24 - 31 mmol/L    Urea Nitrogen 11 8 - 25 mg/dL    Creatinine 0.70 0.40 - 1.60 mg/dL    eGFR >90 >60 mL/min/1.73m*2    Calcium 9.8 8.5 - 10.4 mg/dL    Albumin 4.4 3.5 - 5.0 g/dL    Alkaline Phosphatase 94 35 - 125 U/L    Total Protein 8.1 (H) 5.9 - 7.9 g/dL    AST 38 5 - 40 U/L    Bilirubin, Total 0.5 0.1 - 1.2 mg/dL    ALT 48 (H) 5 - 40 U/L    Anion Gap 14 <=19 mmol/L   NT Pro-BNP   Result Value Ref Range    PROBNP 152 0 - 353 pg/mL   TSH with reflex to Free T4 if abnormal   Result Value Ref Range    Thyroid Stimulating Hormone 0.64 0.27 - 4.20 mIU/L   BLOOD GAS LACTIC ACID, VENOUS    Result Value Ref Range    POCT Lactate, Venous 2.7 (H) 0.4 - 2.0 mmol/L   Serial Troponin, Initial ([facility])   Result Value Ref Range    Troponin T, High Sensitivity 9 <=14 ng/L   Magnesium   Result Value Ref Range    Magnesium 1.80 1.60 - 3.10 mg/dL   Sars-CoV-2 PCR   Result Value Ref Range    Coronavirus 2019, PCR Not Detected Not Detected   Urinalysis with Reflex Culture and Microscopic   Result Value Ref Range    Color, Urine Colorless (N) Light-Yellow, Yellow, Dark-Yellow    Appearance, Urine Clear Clear    Specific Gravity, Urine 1.012 1.005 - 1.035    pH, Urine 7.0 5.0, 5.5, 6.0, 6.5, 7.0, 7.5, 8.0    Protein, Urine 50 (1+) (A) NEGATIVE, 10 (TRACE), 20 (TRACE) mg/dL    Glucose, Urine Normal Normal mg/dL    Blood, Urine NEGATIVE NEGATIVE    Ketones, Urine NEGATIVE NEGATIVE mg/dL    Bilirubin, Urine NEGATIVE NEGATIVE    Urobilinogen, Urine Normal Normal mg/dL    Nitrite, Urine NEGATIVE NEGATIVE    Leukocyte Esterase, Urine NEGATIVE NEGATIVE   Urinalysis Microscopic   Result Value Ref Range    WBC, Urine 1-5 1-5, NONE /HPF    RBC, Urine 1-2 NONE, 1-2, 3-5 /HPF    Mucus, Urine FEW Reference range not established. /LPF       CT angio chest for pulmonary embolism  Result Date: 8/17/2024  Interpreted By:  Freddy Umanzor, STUDY: CT ANGIO CHEST FOR PULMONARY EMBOLISM;  8/17/2024 5:42 pm   INDICATION: Signs/Symptoms:tachycardia, hypoxia; r/o PE.   COMPARISON: Chest CT 09/11/2023   ACCESSION NUMBER(S): [id number]   ORDERING CLINICIAN: SIOMARA FALCON   TECHNIQUE: Helical data acquisition of the chest was obtained after the intravenous administration of  of contrast. Images were reformatted in coronal and sagittal planes. Axial and coronal MIP images were created and reviewed.   FINDINGS: POTENTIAL LIMITATIONS OF THE STUDY: None   HEART AND VESSELS: No discrete filling defects within the adequately visualized portions of main pulmonary artery or its branches.   Main pulmonary artery and its branches are unremarkable in  caliber.   The thoracic aorta is unchanged with respect to course, caliber, and contour.   No coronary artery calcifications are seen.The study is not optimized for evaluation of coronary arteries.   The cardiac chambers are not enlarged.   No evidence of pericardial effusion.   MEDIASTINUM AND NARCISO, LOWER NECK AND AXILLA: Peripherally calcified 1.6 cm right thyroid nodule, unchanged in size.   No evidence of thoracic lymphadenopathy by CT criteria.Subcentimeter mediastinal lymph nodes are present, nonspecific.   Esophagus appears within normal limits as seen.   LUNGS AND AIRWAYS: The trachea and central airways are patent. No endobronchial lesion.   There is been significant interval increased perihilar confluent opacification with air bronchograms noted centrally and extensive nodular and patchy airspace opacities extending into the bilateral lower lobes as well as opacification of the right paramediastinal region anteriorly within the right upper lobe. New patchy and nodular airspace opacities of the right middle lobe.   No pneumothorax or effusion.   UPPER ABDOMEN: Cholelithiasis. Remaining subdiaphragmatic findings appear stable.   CHEST WALL AND OSSEOUS STRUCTURES: No acute osseous abnormality.Multilevel degenerative changes are noted throughout the imaged spine.       1.  No evidence of pulmonary embolus. 2. Extensive bilateral bilateral lung opacification in predominantly perihilar distribution with patchy and nodular airspace opacities extending to the bilateral lower lobes and right middle lobe. 3. Remaining intrathoracic findings appear stable since comparison imaging 09/11/2023.     Signed by: Freddy Umanzor 8/17/2024 6:52 PM Dictation workstation:   [id number]    XR chest 1 view  Result Date: 8/17/2024  Interpreted By:  Deepak Weeks, STUDY: XR CHEST 1 VIEW;  8/17/2024 4:30 pm   INDICATION: Signs/Symptoms:hypoxic.   COMPARISON: None.   ACCESSION NUMBER(S): [id number]   ORDERING CLINICIAN: ANTONY  OLLEY   FINDINGS: Heart size borderline enlarged. Suspect some degree of mild underlying vascular congestion. There is some patchy atelectasis or infiltrate in the lung bases.   No pneumothorax or significant effusion.       Bibasilar patchy infiltrate or atelectasis.   MACRO: None   Signed by: Deepak Weeks 8/17/2024 5:16 PM Dictation workstation:   [id number]        Assessment/Plan   Aspiration pneumonia / SIRS  Chest xray revealed Bibasilar patchy infiltrate or atelectasis. CT angio No evidence of pulmonary embolus and Extensive bilateral bilateral lung opacification.   WBC 7.3, Lactate 2.7, tachycardia, and febrile  -Continue IV Zosyn, patient has allergy to Ceftriaxone, currently tolerating Zosyn  -Continue IV fluids  -Continuous pulse ox, maintain 02 >92% and telemetry   -Following blood cultures   -Ordered Ibuprofen for fever PRN, patient has allergy to Acetaminophen  -Ordered barium swallow and SLP   -Consult ID     Hypertension   -Hold home medication, Norvasc for now    Hyperlipidemia   -Continue home medication      DVT Prophylaxis   -Ordered Lovenox    Disposition: Admit patient to SDU with telemetry, barium swallow study and speech to evaluate. Appreciate recommendations from ID.       Jes Vicente, SILVIA-CNP

## 2024-08-17 NOTE — ED PROVIDER NOTES
HPI   Chief Complaint   Patient presents with    Shortness of Breath     Since this morning patient has been having shortness of breath, vomiting, and chills.  Patient states this morning she choked on pizza and this started after.        HPI  Patient is a 73-year-old female with history of lung cancer who presents to ED for acute shortness of breath since this morning.  Patient states she aspirated a piece of pizza this morning and ever since then she has been short of breath, vomiting and having chills.  She states she has had lung cancer for 8 years and is not currently undergoing chemo, she is in remission.  She denies any complaints of chest pain.  Denies any headache or dizziness, cough or congestion, abdominal pain or diarrhea, urinary symptoms.  Denies any recent hospitalizations or surgeries.  Denies any recent travel or sick contacts.  Denies any history of PE or DVT.  Does not take blood thinners.      Patient History   Past Medical History:   Diagnosis Date    Breast cancer (Multi)     rt breast cancer with lumpectomy and radiation    Combined forms of age-related cataract of left eye     Disorder of refraction     Eyelid inflammation     Intraductal carcinoma in situ of unspecified breast 02/19/2018    DCIS (ductal carcinoma in situ) of breast    Malignant neoplasm of unspecified part of unspecified bronchus or lung (Multi) 02/17/2016    Non-small cell lung cancer (NSCLC)    Open angle with borderline findings, low risk, bilateral     Personal history of irradiation     Pseudophakia      Past Surgical History:   Procedure Laterality Date    BREAST LUMPECTOMY  02/17/2016    Breast Surgery Lumpectomy    CATARACT EXTRACTION Right     2016 Dr Barry    COLONOSCOPY      polypectomy    CT GUIDED IMAGING FOR NEEDLE PLACEMENT  05/22/2015    CT GUIDED IMAGING FOR NEEDLE PLACEMENT [facility]    INTRAOCULAR LENS INSERTION      OTHER SURGICAL HISTORY  02/17/2016    Biopsy Lung Percutaneous    OTHER SURGICAL  HISTORY  07/21/2021    Tonsillectomy with adenoidectomy    OTHER SURGICAL HISTORY  02/16/2018    Radiation Therapy    UPPER GASTROINTESTINAL ENDOSCOPY  2023    GERD - Dr. Germaine Liriano    US GUIDED THYROID BIOPSY  11/07/2023    US GUIDED THYROID BIOPSY 11/7/2023 CAT      Family History   Problem Relation Name Age of Onset    Other (brain tumor) Mother      Cancer Mother       Social History     Tobacco Use    Smoking status: Never    Smokeless tobacco: Never   Vaping Use    Vaping status: Never Used   Substance Use Topics    Alcohol use: Yes    Drug use: Never       Physical Exam   ED Triage Vitals [08/17/24 1548]   Temperature Heart Rate Respirations BP   (!) 38 °C (100.4 °F) (!) 125 18 161/81      Pulse Ox Temp Source Heart Rate Source Patient Position   (!) 88 % Temporal Monitor Sitting      BP Location FiO2 (%)     Left arm --       Physical Exam  Vitals reviewed.   Constitutional:       General: She is in acute distress.      Appearance: Normal appearance. She is well-developed. She is ill-appearing.   HENT:      Head: Normocephalic and atraumatic.   Eyes:      Extraocular Movements: Extraocular movements intact.      Pupils: Pupils are equal, round, and reactive to light.   Cardiovascular:      Rate and Rhythm: Regular rhythm. Tachycardia present.   Pulmonary:      Effort: Pulmonary effort is normal.      Breath sounds: Normal breath sounds.   Abdominal:      General: Abdomen is flat.      Palpations: Abdomen is soft.      Tenderness: There is no abdominal tenderness.   Musculoskeletal:         General: Normal range of motion.      Cervical back: Normal range of motion and neck supple.   Skin:     General: Skin is warm and dry.      Coloration: Skin is pale.   Neurological:      General: No focal deficit present.      Mental Status: She is alert and oriented to person, place, and time.   Psychiatric:         Mood and Affect: Mood normal.         Behavior: Behavior normal.           ED Course & MDM   Diagnoses  as of 08/17/24 2209   Aspiration pneumonia of both lower lobes due to regurgitated food (Multi)   Acute hypoxic respiratory failure (Multi)                 No data recorded     [place] Coma Scale Score: 15 (08/17/24 1550 : Suzi Clayton RN)                           Medical Decision Making  Parts of this chart have been completed using voice recognition software. Please excuse any errors of transcription.  My thought process and reason for plan has been formulated from the time that I saw the patient until the time of disposition and is not specific to one specific moment during their visit and furthermore my MDM encompasses this entire chart and not only this text box.    HPI:   Detailed above.    Exam:   A medically appropriate exam performed, outlined above, given the known history and presentation.    History obtained from:   Patient    EKG/Cardiac monitor:     Social Determinants of Health considered during this visit:       Medications given during visit:  Medications   aspirin EC tablet 81 mg (81 mg oral Given 8/17/24 2059)   pravastatin (Pravachol) tablet 80 mg (80 mg oral Given 8/17/24 2059)   traMADol (Ultram) tablet 50 mg (has no administration in time range)   pantoprazole (ProtoNix) EC tablet 40 mg (has no administration in time range)   amLODIPine (Norvasc) tablet 5 mg ( oral Dose Auto Held 8/21/24 0900)   enoxaparin (Lovenox) syringe 40 mg (40 mg subcutaneous Given 8/17/24 2059)   sodium chloride 0.9% infusion (has no administration in time range)   polyethylene glycol (Glycolax, Miralax) packet 17 g (has no administration in time range)   ondansetron (Zofran) tablet 4 mg (has no administration in time range)     Or   ondansetron (Zofran) injection 4 mg (has no administration in time range)   ibuprofen tablet 400 mg (has no administration in time range)   piperacillin-tazobactam (Zosyn) 4.5 g in dextrose (iso)  mL (has no administration in time range)   sodium chloride 0.9 % bolus 1,974 mL  (1,974 mL intravenous New Bag 8/17/24 1621)   piperacillin-tazobactam (Zosyn) 4.5 g in dextrose (iso)  mL (0 g intravenous Stopped 8/17/24 1809)   iohexol (OMNIPaque) 350 mg iodine/mL solution 75 mL (75 mL intravenous Given 8/17/24 1733)        Diagnostic/tests:  Labs Reviewed   CBC WITH AUTO DIFFERENTIAL - Abnormal       Result Value    WBC 7.3      nRBC 0.0      RBC 5.52 (*)     Hemoglobin 15.2      Hematocrit 46.4 (*)     MCV 84      MCH 27.5      MCHC 32.8      RDW 13.0      Platelets 172      Neutrophils % 86.2      Immature Granulocytes %, Automated 0.3      Lymphocytes % 10.0      Monocytes % 3.1      Eosinophils % 0.1      Basophils % 0.3      Neutrophils Absolute 6.31 (*)     Immature Granulocytes Absolute, Automated 0.02      Lymphocytes Absolute 0.73 (*)     Monocytes Absolute 0.23      Eosinophils Absolute 0.01      Basophils Absolute 0.02     COMPREHENSIVE METABOLIC PANEL - Abnormal    Glucose 115 (*)     Sodium 138      Potassium 3.8      Chloride 99      Bicarbonate 25      Urea Nitrogen 11      Creatinine 0.70      eGFR >90      Calcium 9.8      Albumin 4.4      Alkaline Phosphatase 94      Total Protein 8.1 (*)     AST 38      Bilirubin, Total 0.5      ALT 48 (*)     Anion Gap 14     BLOOD GAS LACTIC ACID, VENOUS - Abnormal    POCT Lactate, Venous 2.7 (*)    URINALYSIS WITH REFLEX CULTURE AND MICROSCOPIC - Abnormal    Color, Urine Colorless (*)     Appearance, Urine Clear      Specific Gravity, Urine 1.012      pH, Urine 7.0      Protein, Urine 50 (1+) (*)     Glucose, Urine Normal      Blood, Urine NEGATIVE      Ketones, Urine NEGATIVE      Bilirubin, Urine NEGATIVE      Urobilinogen, Urine Normal      Nitrite, Urine NEGATIVE      Leukocyte Esterase, Urine NEGATIVE     N-TERMINAL PROBNP - Normal    PROBNP 152      Narrative:     Reference ranges are based on clinical submission data. These ranges represent the 95th percentile of normal cut-off points. As NT Pro- BNP values approach 1000  pg/ml, clinical symptoms are more likely associated with CHF.   TSH WITH REFLEX TO FREE T4 IF ABNORMAL - Normal    Thyroid Stimulating Hormone 0.64      Narrative:     TSH testing is performed using different testing methodology at [facility] than at other [facility] hospitals. Direct result comparisons should only be made within the same method.     SARS-COV-2 PCR - Normal    Coronavirus 2019, PCR Not Detected      Narrative:     This assay has received FDA Emergency Use Authorization (EUA) and is only authorized for the duration of time that circumstances exist to justify the authorization of the emergency use of in vitro diagnostic tests for the detection of SARS-CoV-2 virus and/or diagnosis of COVID-19 infection under section 564(b)(1) of the Act, 21 U.S.C. 360bbb-3(b)(1). This assay is an in vitro diagnostic nucleic acid amplification test for the qualitative detection of SARS-CoV-2 from nasopharyngeal specimens and has been validated for use at [facility]. Negative results do not preclude COVID-19 infections and should not be used as the sole basis for diagnosis, treatment, or other management decisions.     SERIAL TROPONIN, INITIAL ([facility]) - Normal    Troponin T, High Sensitivity 9     MAGNESIUM - Normal    Magnesium 1.80     SERIAL TROPONIN,  2 HOUR ([facility]) - Normal    Troponin T, High Sensitivity 11     BLOOD GAS LACTIC ACID, VENOUS - Normal    POCT Lactate, Venous 1.8     BLOOD CULTURE   BLOOD CULTURE   TROPONIN T SERIES, HIGH SENSITIVITY (0, 2 HR, 6 HR)    Narrative:     The following orders were created for panel order Troponin T Series, High Sensitivity (0, 2HR, 6HR).  Procedure                               Abnormality         Status                     ---------                               -----------         ------                     Serial Troponin, Initial...[[id number]]  Normal              Final result               Serial Troponin, 2 Hour ...[[id number]]  Normal               Final result               Serial Troponin, 6 Hour ...[[id number]]                                                   Please view results for these tests on the individual orders.   BLOOD GAS LACTIC ACID, VENOUS   URINALYSIS WITH REFLEX CULTURE AND MICROSCOPIC    Narrative:     The following orders were created for panel order Urinalysis with Reflex Culture and Microscopic.  Procedure                               Abnormality         Status                     ---------                               -----------         ------                     Urinalysis with Reflex C...[[id number]]  Abnormal            Final result               Extra Urine Gray Tube[[id number]]                            In process                   Please view results for these tests on the individual orders.   EXTRA URINE GRAY TUBE   SERIAL TROPONIN, 6 HOUR ([facility])   CBC   BASIC METABOLIC PANEL   URINALYSIS MICROSCOPIC WITH REFLEX CULTURE    WBC, Urine 1-5      RBC, Urine 1-2      Mucus, Urine FEW        CT angio chest for pulmonary embolism   Final Result   1.  No evidence of pulmonary embolus.   2. Extensive bilateral bilateral lung opacification in predominantly   perihilar distribution with patchy and nodular airspace opacities   extending to the bilateral lower lobes and right middle lobe.   3. Remaining intrathoracic findings appear stable since comparison   imaging 09/11/2023.             Signed by: Freddy Umanzor 8/17/2024 6:52 PM   Dictation workstation:   [id number]      XR chest 1 view   Final Result   Bibasilar patchy infiltrate or atelectasis.        MACRO:   None        Signed by: Deepak Weeks 8/17/2024 5:16 PM   Dictation workstation:   [id number]      FL modified barium swallow study    (Results Pending)        Differential Diagnosis:       Consultations:      Procedures:      Critical Care:  Upon my evaluation, this patient had a high probability of imminent or life-threatening deterioration due to acute hypoxic  respiratory failure, which required my direct attention, intervention, and personal management.    I have personally provided 30 minutes of non-concurrent critical care time exclusive of time spent on separately billable procedures. Time includes review of laboratory data, radiology results, discussion with consultants, and monitoring for potential decompensation. Interventions were performed as documented above.    Based on the patient´s presentation, I believe the patient has:    [Sepsis as the patient has SIRS + a suspected infection]     [Severe Sepsis as the patient has sepsis causing at least one acute organ dysfunction (lactate >2.0, SBP <90, MAP <65, AMS, creatinine >2.0, bilirubin >2.0, platelets <100,000, INR >1.5, PTT >60)]     [Septic Shock as the patient is septic with a lactate >= 4 or is hypotensive despite fluid resuscitation]     Blood cultures and an initial lactate obtained and IV antibiotics are administered     Crystalloid administered    [30 cc/kg IV fluid bolus (UNLESS BMI >30 THEN IBW USED)]       [Repeat lactate obtained (if initial >2.0)]    Repeat tissue reperfusion exam after fluid bolus performed time: 1800    Referrals:      Discharge Prescriptions:      MDM Summary:  Patient will require admission inpatient medical service for sepsis and bilateral basilar pneumonia.  Patient started on Zosyn in the ED.  Lab work notable for elevated lactic acid of 2.7.  No leukocytosis or anemia.  No electrolyte abnormality or acute kidney injury.  No transaminitis.  Urinalysis shows no evidence of urinary tract infection.  CTA of chest is negative for PE.  Chest x-ray shows bilateral basilar pneumonia.  Patient meeting sepsis criteria at this time.    I spoke with the admitting physician Dr. Bingham. We thoroughly discussed the patient's medical history, physical exam, laboratory and imaging studies, as well as, emergency department course. This also includes the patient's comorbidities, surgical  history, medications, and living situation. Based upon that discussion, we've decided to admit for further management and evaluation of their sepsis. The patient will be admitted to stepdown unit as determined by myself and the admitting physician. The admitting physician has been fully informed regarding this case and agreed to place the admission order for the patient. Please see the admission H&P authored by Dr. Bingham for further detail.    Procedure  Procedures     Mario Delarosa PA-C  08/17/24 [id number]

## 2024-08-18 VITALS
SYSTOLIC BLOOD PRESSURE: 112 MMHG | HEIGHT: 61 IN | OXYGEN SATURATION: 92 % | DIASTOLIC BLOOD PRESSURE: 58 MMHG | TEMPERATURE: 97.2 F | WEIGHT: 126.1 LBS | RESPIRATION RATE: 24 BRPM | BODY MASS INDEX: 23.81 KG/M2 | HEART RATE: 84 BPM

## 2024-08-18 LAB
ANION GAP SERPL CALC-SCNC: 10 MMOL/L
BUN SERPL-MCNC: 11 MG/DL (ref 8–25)
CALCIUM SERPL-MCNC: 8.6 MG/DL (ref 8.5–10.4)
CHLORIDE SERPL-SCNC: 106 MMOL/L (ref 97–107)
CO2 SERPL-SCNC: 26 MMOL/L (ref 24–31)
CREAT SERPL-MCNC: 0.9 MG/DL (ref 0.4–1.6)
EGFRCR SERPLBLD CKD-EPI 2021: 68 ML/MIN/1.73M*2
ERYTHROCYTE [DISTWIDTH] IN BLOOD BY AUTOMATED COUNT: 13.1 % (ref 11.5–14.5)
GLUCOSE BLD MANUAL STRIP-MCNC: 100 MG/DL (ref 74–99)
GLUCOSE BLD MANUAL STRIP-MCNC: 103 MG/DL (ref 74–99)
GLUCOSE BLD MANUAL STRIP-MCNC: 131 MG/DL (ref 74–99)
GLUCOSE SERPL-MCNC: 110 MG/DL (ref 65–99)
HCT VFR BLD AUTO: 35.9 % (ref 36–46)
HGB BLD-MCNC: 11.8 G/DL (ref 12–16)
HOLD SPECIMEN: NORMAL
LACTATE BLDV-SCNC: 0.9 MMOL/L (ref 0.4–2)
MCH RBC QN AUTO: 27.1 PG (ref 26–34)
MCHC RBC AUTO-ENTMCNC: 32.9 G/DL (ref 32–36)
MCV RBC AUTO: 83 FL (ref 80–100)
NRBC BLD-RTO: 0 /100 WBCS (ref 0–0)
PLATELET # BLD AUTO: 143 X10*3/UL (ref 150–450)
POTASSIUM SERPL-SCNC: 3.8 MMOL/L (ref 3.4–5.1)
RBC # BLD AUTO: 4.35 X10*6/UL (ref 4–5.2)
SODIUM SERPL-SCNC: 142 MMOL/L (ref 133–145)
WBC # BLD AUTO: 10.2 X10*3/UL (ref 4.4–11.3)

## 2024-08-18 PROCEDURE — 2500000004 HC RX 250 GENERAL PHARMACY W/ HCPCS (ALT 636 FOR OP/ED): Performed by: FAMILY MEDICINE

## 2024-08-18 PROCEDURE — 2500000004 HC RX 250 GENERAL PHARMACY W/ HCPCS (ALT 636 FOR OP/ED): Performed by: NURSE PRACTITIONER

## 2024-08-18 PROCEDURE — 36415 COLL VENOUS BLD VENIPUNCTURE: CPT | Performed by: NURSE PRACTITIONER

## 2024-08-18 PROCEDURE — 2500000001 HC RX 250 WO HCPCS SELF ADMINISTERED DRUGS (ALT 637 FOR MEDICARE OP): Performed by: NURSE PRACTITIONER

## 2024-08-18 PROCEDURE — 85027 COMPLETE CBC AUTOMATED: CPT | Performed by: NURSE PRACTITIONER

## 2024-08-18 PROCEDURE — 83605 ASSAY OF LACTIC ACID: CPT | Performed by: NURSE PRACTITIONER

## 2024-08-18 PROCEDURE — 2060000001 HC INTERMEDIATE ICU ROOM DAILY

## 2024-08-18 PROCEDURE — 82565 ASSAY OF CREATININE: CPT | Performed by: NURSE PRACTITIONER

## 2024-08-18 PROCEDURE — 82947 ASSAY GLUCOSE BLOOD QUANT: CPT

## 2024-08-18 SDOH — SOCIAL STABILITY: SOCIAL INSECURITY: HAVE YOU HAD THOUGHTS OF HARMING ANYONE ELSE?: NO

## 2024-08-18 SDOH — HEALTH STABILITY: MENTAL HEALTH
HOW OFTEN DO YOU NEED TO HAVE SOMEONE HELP YOU WHEN YOU READ INSTRUCTIONS, PAMPHLETS, OR OTHER WRITTEN MATERIAL FROM YOUR DOCTOR OR PHARMACY?: RARELY

## 2024-08-18 SDOH — SOCIAL STABILITY: SOCIAL INSECURITY
WITHIN THE LAST YEAR, HAVE TO BEEN RAPED OR FORCED TO HAVE ANY KIND OF SEXUAL ACTIVITY BY YOUR PARTNER OR EX-PARTNER?: NO

## 2024-08-18 SDOH — HEALTH STABILITY: MENTAL HEALTH: HOW MANY STANDARD DRINKS CONTAINING ALCOHOL DO YOU HAVE ON A TYPICAL DAY?: 1 OR 2

## 2024-08-18 SDOH — ECONOMIC STABILITY: TRANSPORTATION INSECURITY
IN THE PAST 12 MONTHS, HAS THE LACK OF TRANSPORTATION KEPT YOU FROM MEDICAL APPOINTMENTS OR FROM GETTING MEDICATIONS?: NO

## 2024-08-18 SDOH — HEALTH STABILITY: MENTAL HEALTH: HOW OFTEN DO YOU HAVE 6 OR MORE DRINKS ON ONE OCCASION?: NEVER

## 2024-08-18 SDOH — ECONOMIC STABILITY: INCOME INSECURITY: IN THE LAST 12 MONTHS, WAS THERE A TIME WHEN YOU WERE NOT ABLE TO PAY THE MORTGAGE OR RENT ON TIME?: NO

## 2024-08-18 SDOH — ECONOMIC STABILITY: HOUSING INSECURITY: AT ANY TIME IN THE PAST 12 MONTHS, WERE YOU HOMELESS OR LIVING IN A SHELTER (INCLUDING NOW)?: NO

## 2024-08-18 SDOH — SOCIAL STABILITY: SOCIAL NETWORK
DO YOU BELONG TO ANY CLUBS OR ORGANIZATIONS SUCH AS CHURCH GROUPS UNIONS, FRATERNAL OR ATHLETIC GROUPS, OR SCHOOL GROUPS?: NO

## 2024-08-18 SDOH — ECONOMIC STABILITY: FOOD INSECURITY: WITHIN THE PAST 12 MONTHS, THE FOOD YOU BOUGHT JUST DIDN'T LAST AND YOU DIDN'T HAVE MONEY TO GET MORE.: NEVER TRUE

## 2024-08-18 SDOH — SOCIAL STABILITY: SOCIAL INSECURITY: WITHIN THE LAST YEAR, HAVE YOU BEEN AFRAID OF YOUR PARTNER OR EX-PARTNER?: NO

## 2024-08-18 SDOH — ECONOMIC STABILITY: INCOME INSECURITY: HOW HARD IS IT FOR YOU TO PAY FOR THE VERY BASICS LIKE FOOD, HOUSING, MEDICAL CARE, AND HEATING?: NOT HARD AT ALL

## 2024-08-18 SDOH — ECONOMIC STABILITY: TRANSPORTATION INSECURITY
IN THE PAST 12 MONTHS, HAS LACK OF TRANSPORTATION KEPT YOU FROM MEETINGS, WORK, OR FROM GETTING THINGS NEEDED FOR DAILY LIVING?: NO

## 2024-08-18 SDOH — ECONOMIC STABILITY: FOOD INSECURITY: WITHIN THE PAST 12 MONTHS, YOU WORRIED THAT YOUR FOOD WOULD RUN OUT BEFORE YOU GOT MONEY TO BUY MORE.: NEVER TRUE

## 2024-08-18 SDOH — HEALTH STABILITY: MENTAL HEALTH
STRESS IS WHEN SOMEONE FEELS TENSE, NERVOUS, ANXIOUS, OR CAN'T SLEEP AT NIGHT BECAUSE THEIR MIND IS TROUBLED. HOW STRESSED ARE YOU?: ONLY A LITTLE

## 2024-08-18 SDOH — SOCIAL STABILITY: SOCIAL INSECURITY: HAS ANYONE EVER THREATENED TO HURT YOUR FAMILY OR YOUR PETS?: NO

## 2024-08-18 SDOH — SOCIAL STABILITY: SOCIAL INSECURITY: WITHIN THE LAST YEAR, HAVE YOU BEEN HUMILIATED OR EMOTIONALLY ABUSED IN OTHER WAYS BY YOUR PARTNER OR EX-PARTNER?: NO

## 2024-08-18 SDOH — SOCIAL STABILITY: SOCIAL NETWORK: ARE YOU MARRIED, WIDOWED, DIVORCED, SEPARATED, NEVER MARRIED, OR LIVING WITH A PARTNER?: MARRIED

## 2024-08-18 SDOH — SOCIAL STABILITY: SOCIAL INSECURITY: DOES ANYONE TRY TO KEEP YOU FROM HAVING/CONTACTING OTHER FRIENDS OR DOING THINGS OUTSIDE YOUR HOME?: NO

## 2024-08-18 SDOH — SOCIAL STABILITY: SOCIAL INSECURITY: DO YOU FEEL ANYONE HAS EXPLOITED OR TAKEN ADVANTAGE OF YOU FINANCIALLY OR OF YOUR PERSONAL PROPERTY?: NO

## 2024-08-18 SDOH — ECONOMIC STABILITY: HOUSING INSECURITY: IN THE PAST 12 MONTHS, HOW MANY TIMES HAVE YOU MOVED WHERE YOU WERE LIVING?: 0

## 2024-08-18 SDOH — HEALTH STABILITY: MENTAL HEALTH: HOW OFTEN DO YOU HAVE A DRINK CONTAINING ALCOHOL?: MONTHLY OR LESS

## 2024-08-18 SDOH — SOCIAL STABILITY: SOCIAL INSECURITY: ARE YOU OR HAVE YOU BEEN THREATENED OR ABUSED PHYSICALLY, EMOTIONALLY, OR SEXUALLY BY ANYONE?: NO

## 2024-08-18 SDOH — HEALTH STABILITY: PHYSICAL HEALTH: ON AVERAGE, HOW MANY DAYS PER WEEK DO YOU ENGAGE IN MODERATE TO STRENUOUS EXERCISE (LIKE A BRISK WALK)?: 0 DAYS

## 2024-08-18 SDOH — SOCIAL STABILITY: SOCIAL NETWORK: HOW OFTEN DO YOU ATTENT MEETINGS OF THE CLUB OR ORGANIZATION YOU BELONG TO?: NEVER

## 2024-08-18 SDOH — SOCIAL STABILITY: SOCIAL INSECURITY: WERE YOU ABLE TO COMPLETE ALL THE BEHAVIORAL HEALTH SCREENINGS?: YES

## 2024-08-18 SDOH — SOCIAL STABILITY: SOCIAL NETWORK: HOW OFTEN DO YOU GET TOGETHER WITH FRIENDS OR RELATIVES?: ONCE A WEEK

## 2024-08-18 SDOH — SOCIAL STABILITY: SOCIAL INSECURITY: ARE THERE ANY APPARENT SIGNS OF INJURIES/BEHAVIORS THAT COULD BE RELATED TO ABUSE/NEGLECT?: NO

## 2024-08-18 SDOH — HEALTH STABILITY: PHYSICAL HEALTH: ON AVERAGE, HOW MANY MINUTES DO YOU ENGAGE IN EXERCISE AT THIS LEVEL?: 0 MIN

## 2024-08-18 SDOH — SOCIAL STABILITY: SOCIAL INSECURITY
WITHIN THE LAST YEAR, HAVE YOU BEEN KICKED, HIT, SLAPPED, OR OTHERWISE PHYSICALLY HURT BY YOUR PARTNER OR EX-PARTNER?: NO

## 2024-08-18 SDOH — ECONOMIC STABILITY: INCOME INSECURITY: IN THE PAST 12 MONTHS, HAS THE ELECTRIC, GAS, OIL, OR WATER COMPANY THREATENED TO SHUT OFF SERVICE IN YOUR HOME?: NO

## 2024-08-18 SDOH — SOCIAL STABILITY: SOCIAL NETWORK
IN A TYPICAL WEEK, HOW MANY TIMES DO YOU TALK ON THE PHONE WITH FAMILY, FRIENDS, OR NEIGHBORS?: MORE THAN THREE TIMES A WEEK

## 2024-08-18 SDOH — SOCIAL STABILITY: SOCIAL INSECURITY: ABUSE: ADULT

## 2024-08-18 SDOH — SOCIAL STABILITY: SOCIAL INSECURITY: HAVE YOU HAD ANY THOUGHTS OF HARMING ANYONE ELSE?: NO

## 2024-08-18 SDOH — SOCIAL STABILITY: SOCIAL NETWORK: HOW OFTEN DO YOU ATTEND CHURCH OR RELIGIOUS SERVICES?: NEVER

## 2024-08-18 SDOH — SOCIAL STABILITY: SOCIAL INSECURITY: DO YOU FEEL UNSAFE GOING BACK TO THE PLACE WHERE YOU ARE LIVING?: NO

## 2024-08-18 ASSESSMENT — ACTIVITIES OF DAILY LIVING (ADL)
TOILETING: INDEPENDENT
BATHING: INDEPENDENT
HEARING - RIGHT EAR: FUNCTIONAL
PATIENT'S MEMORY ADEQUATE TO SAFELY COMPLETE DAILY ACTIVITIES?: YES
DRESSING YOURSELF: INDEPENDENT
WALKS IN HOME: INDEPENDENT
JUDGMENT_ADEQUATE_SAFELY_COMPLETE_DAILY_ACTIVITIES: YES
ADEQUATE_TO_COMPLETE_ADL: YES
GROOMING: INDEPENDENT
FEEDING YOURSELF: INDEPENDENT
HEARING - LEFT EAR: FUNCTIONAL

## 2024-08-18 ASSESSMENT — LIFESTYLE VARIABLES
HOW MANY STANDARD DRINKS CONTAINING ALCOHOL DO YOU HAVE ON A TYPICAL DAY: 1 OR 2
AUDIT-C TOTAL SCORE: 1
HOW OFTEN DO YOU HAVE 6 OR MORE DRINKS ON ONE OCCASION: NEVER
SKIP TO QUESTIONS 9-10: 1
AUDIT-C TOTAL SCORE: 1
HOW OFTEN DO YOU HAVE A DRINK CONTAINING ALCOHOL: MONTHLY OR LESS
AUDIT-C TOTAL SCORE: 1
SKIP TO QUESTIONS 9-10: 1

## 2024-08-18 ASSESSMENT — ENCOUNTER SYMPTOMS
FEVER: 1
CHILLS: 1
SHORTNESS OF BREATH: 1
COUGH: 1
NAUSEA: 1

## 2024-08-18 ASSESSMENT — COGNITIVE AND FUNCTIONAL STATUS - GENERAL
MOBILITY SCORE: 24
DAILY ACTIVITIY SCORE: 24
MOBILITY SCORE: 24
DAILY ACTIVITIY SCORE: 24
PATIENT BASELINE BEDBOUND: NO

## 2024-08-18 ASSESSMENT — PAIN DESCRIPTION - ORIENTATION
ORIENTATION: RIGHT
ORIENTATION: RIGHT

## 2024-08-18 ASSESSMENT — PAIN - FUNCTIONAL ASSESSMENT
PAIN_FUNCTIONAL_ASSESSMENT: 0-10
PAIN_FUNCTIONAL_ASSESSMENT: 0-10

## 2024-08-18 ASSESSMENT — PATIENT HEALTH QUESTIONNAIRE - PHQ9
1. LITTLE INTEREST OR PLEASURE IN DOING THINGS: NOT AT ALL
2. FEELING DOWN, DEPRESSED OR HOPELESS: NOT AT ALL
SUM OF ALL RESPONSES TO PHQ9 QUESTIONS 1 & 2: 0

## 2024-08-18 ASSESSMENT — PAIN DESCRIPTION - LOCATION
LOCATION: THROAT
LOCATION: THROAT

## 2024-08-18 ASSESSMENT — PAIN SCALES - GENERAL
PAINLEVEL_OUTOF10: 0 - NO PAIN
PAINLEVEL_OUTOF10: 6
PAINLEVEL_OUTOF10: 0 - NO PAIN
PAINLEVEL_OUTOF10: 6

## 2024-08-18 NOTE — PROGRESS NOTES
08/18/24 1057   Physical Activity   On average, how many days per week do you engage in moderate to strenuous exercise (like a brisk walk)? 0 days   On average, how many minutes do you engage in exercise at this level? 0 min   Financial Resource Strain   How hard is it for you to pay for the very basics like food, housing, medical care, and heating? Not hard   Housing Stability   In the last 12 months, was there a time when you were not able to pay the mortgage or rent on time? N   In the past 12 months, how many times have you moved where you were living? 0   At any time in the past 12 months, were you homeless or living in a shelter (including now)? N   Transportation Needs   In the past 12 months, has lack of transportation kept you from medical appointments or from getting medications? no   In the past 12 months, has lack of transportation kept you from meetings, work, or from getting things needed for daily living? No   Food Insecurity   Within the past 12 months, you worried that your food would run out before you got the money to buy more. Never true   Within the past 12 months, the food you bought just didn't last and you didn't have money to get more. Never true   Stress   Do you feel stress - tense, restless, nervous, or anxious, or unable to sleep at night because your mind is troubled all the time - these days? Only a littl   Social Connections   In a typical week, how many times do you talk on the phone with family, friends, or neighbors? More than 3   How often do you get together with friends or relatives? Once   How often do you attend Religion or Methodist services? Never   Do you belong to any clubs or organizations such as Religion groups, unions, fraternal or athletic groups, or school groups? No   How often do you attend meetings of the clubs or organizations you belong to? Never   Are you , , , , never , or living with a partner?    Intimate Partner  Violence   Within the last year, have you been afraid of your partner or ex-partner? No   Within the last year, have you been humiliated or emotionally abused in other ways by your partner or ex-partner? No   Within the last year, have you been kicked, hit, slapped, or otherwise physically hurt by your partner or ex-partner? No   Within the last year, have you been raped or forced to have any kind of sexual activity by your partner or ex-partner? No   Alcohol Use   Q1: How often do you have a drink containing alcohol? Monthly or l   Q2: How many drinks containing alcohol do you have on a typical day when you are drinking? 1 or 2   Q3: How often do you have six or more drinks on one occasion? Never   Utilities   In the past 12 months has the electric, gas, oil, or water company threatened to shut off services in your home? No   Health Literacy   How often do you need to have someone help you when you read instructions, pamphlets, or other written material from your doctor or pharmacy? Rarely

## 2024-08-18 NOTE — NURSING NOTE
Reported to Dr. Bingham patient complained of left flank pain  and feeling sweaty. NNO at this time. Pain has resolved.

## 2024-08-18 NOTE — PROGRESS NOTES
08/18/24 1059   Discharge Planning   Living Arrangements Spouse/significant other   Support Systems Spouse/significant other;Children;Friends/neighbors   Assistance Needed none   Type of Residence Private residence   Number of Stairs to Enter Residence 2   Number of Stairs Within Residence 2  (flight to basement, flight to upper level)   Do you have animals or pets at home? No   Who is requesting discharge planning? Provider   Home or Post Acute Services None   Expected Discharge Disposition Home   Does the patient need discharge transport arranged? No   Patient Choice   Provider Choice list and CMS website (https://medicare.gov/care-compare#search) for post-acute Quality and Resource Measure Data were provided and reviewed with: Other (Comment)  (no skilled needs)   Patient / Family choosing to utilize agency / facility established prior to hospitalization No

## 2024-08-18 NOTE — PROGRESS NOTES
08/18/24 1100   [facility] Disability Status   Are you deaf or do you have serious difficulty hearing? N   Are you blind or do you have serious difficulty seeing, even when wearing glasses? N   Because of a physical, mental, or emotional condition, do you have serious difficulty concentrating, remembering, or making decisions? (5 years old or older) N   Do you have serious difficulty walking or climbing stairs? N   Do you have serious difficulty dressing or bathing? N   Because of a physical, mental, or emotional condition, do you have serious difficulty doing errands alone such as visiting the doctor? N

## 2024-08-18 NOTE — CONSULTS
Inpatient consult to Infectious Diseases  Consult performed by: Julio César Conley MD  Consult ordered by: Jes Vicente, APRN-CNP          Primary MD: Michael Bingham MD    Reason For Consult  Aspiration pneumonia    History Of Present Illness  Teresa Max is a 73 y.o. female presenting with shortness of breath.  She had a choking episode while eating pizza on day of admission.  She had a nonproductive cough.  She had fever and chills.  She was brought to the hospital for further evaluation and management.  She was found to be hypoxic and placed on supplemental oxygen.  She was started on Zosyn.  Workup was remarkable for extensive bilateral lung opacification.  She reports interval improvement.       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    Surgical History  She has a past surgical history that includes Other surgical history (02/17/2016); Breast lumpectomy (02/17/2016); Other surgical history (07/21/2021); Other surgical history (02/16/2018); CT guided imaging for needle placement (05/22/2015); Cataract extraction (Right); Intraocular lens insertion; US guided thyroid biopsy (11/07/2023); Colonoscopy; and Upper gastrointestinal endoscopy (2023).     Social History     Occupational History    Not on file   Tobacco Use    Smoking status: Never    Smokeless tobacco: Never   Vaping Use    Vaping status: Never Used   Substance and Sexual Activity    Alcohol use: Yes    Drug use: Never    Sexual activity: Yes     Partners: Male     Birth control/protection: Post-menopausal     Travel History   Travel since  07/18/24    No documented travel since 07/18/24           Family History  Family History   Problem Relation Name Age of Onset    Other (brain tumor) Mother      Cancer Mother       Allergies  Acetaminophen, Azithromycin, Cpm-pseudoephed-acetaminophen, Guaifenesin, Hydrocodone, Iueufjltu-aa-toyhflyk-guaifen, and Ceftriaxone     Immunization History   Administered Date(s) Administered    Flu vaccine, quadrivalent, high-dose, preservative free, age 65y+ (FLUZONE) 10/20/2020    Flu vaccine, trivalent, preservative free, age 6 months and greater (Fluarix/Fluzone/Flulaval) 10/08/2009    Influenza, injectable, quadrivalent 01/15/2020    Influenza, seasonal, injectable 12/16/2016    Moderna SARS-CoV-2 Vaccination 02/04/2021, 03/04/2021, 12/18/2021    Pneumococcal polysaccharide vaccine, 23-valent, age 2 years and older (PNEUMOVAX 23) 12/20/2020, 12/30/2020     Medications  Home medications:  Medications Prior to Admission   Medication Sig Dispense Refill Last Dose    amLODIPine (Norvasc) 5 mg tablet        aspirin 81 mg EC tablet Take 1 tablet (81 mg) by mouth once daily. AS DIRECTED.       cholecalciferol (Vitamin D-3) 1,250 mcg (50,000 unit) capsule Take 1 capsule (50,000 Units) by mouth 1 (one) time per week.       cholecalciferol (Vitamin D3) 50 mcg (2,000 unit) capsule Take 1 capsule (50 mcg) by mouth once daily.       diphenhydrAMINE (BENADryl) 50 mg capsule Take 1 capsule (50 mg) by mouth every 6 hours if needed for itching.       EPINEPHrine 0.3 mg/0.3 mL injection syringe Inject 0.3 mL (0.3 mg) as directed 1 time if needed (shortness of breath).       ergocalciferol (Vitamin D-2) 1.25 MG (00001 UT) capsule Take 1 capsule (1,250 mcg) by mouth.       gabapentin (Neurontin) 100 mg capsule        hydrocortisone 2.5 % cream Apply 1 Application topically 2 times a day.       ibuprofen 200 mg tablet Take 1 tablet (200 mg) by mouth every 6 hours if needed.       loratadine (Claritin) 10 mg tablet Take 1 tablet (10 mg) by  "mouth once daily.       omega-3 fatty acids (FISH OIL CONCENTRATE ORAL) Take by mouth.       omeprazole (PriLOSEC) 40 mg DR capsule Take 1 capsule (40 mg) by mouth once daily in the morning. Take before meals.       pantoprazole (ProtoNix) 40 mg EC tablet Take 1 tablet (40 mg) by mouth once daily in the morning. Take before meals.       pravastatin (Pravachol) 80 mg tablet Take 1 tablet (80 mg) by mouth once daily.       traMADol (Ultram) 50 mg tablet Take 1 tablet (50 mg) by mouth.        Current medications:  Scheduled medications  [Held by provider] amLODIPine, 5 mg, oral, Daily  aspirin, 81 mg, oral, Daily  enoxaparin, 40 mg, subcutaneous, q24h  pantoprazole, 40 mg, oral, Daily before breakfast  piperacillin-tazobactam, 4.5 g, intravenous, q6h  pravastatin, 80 mg, oral, Nightly      Continuous medications  sodium chloride 0.9%, 100 mL/hr      PRN medications  PRN medications: ibuprofen, ondansetron **OR** ondansetron, polyethylene glycol, traMADol    Review of Systems   Constitutional:  Positive for chills and fever.   Respiratory:  Positive for cough and shortness of breath.    Gastrointestinal:  Positive for nausea.   All other systems reviewed and are negative.       Objective  Range of Vitals (last 24 hours)  Heart Rate:  []   Temp:  [36 °C (96.8 °F)-38 °C (100.4 °F)]   Resp:  [18-40]   BP: (109-169)/()   Height:  [154.9 cm (5' 1\")]   Weight:  [65.8 kg (145 lb)]   SpO2:  [88 %-98 %]   Daily Weight  08/17/24 : 65.8 kg (145 lb)    Body mass index is 27.4 kg/m².     Physical Exam  Constitutional:       Appearance: Normal appearance.   HENT:      Head: Normocephalic and atraumatic.      Nose: Nose normal.   Eyes:      General: No scleral icterus.     Extraocular Movements: Extraocular movements intact.      Conjunctiva/sclera: Conjunctivae normal.   Cardiovascular:      Rate and Rhythm: Normal rate and regular rhythm.      Heart sounds: Normal heart sounds.   Pulmonary:      Breath sounds: Decreased " "breath sounds present.   Abdominal:      General: Bowel sounds are normal.      Palpations: Abdomen is soft.      Tenderness: There is no abdominal tenderness.   Musculoskeletal:      Cervical back: Normal range of motion and neck supple.      Right lower leg: No edema.      Left lower leg: No edema.   Skin:     General: Skin is warm and dry.   Neurological:      Mental Status: She is alert.   Psychiatric:         Behavior: Behavior is cooperative.          Relevant Results  Outside Hospital Results    Labs  Results from last 72 hours   Lab Units 08/18/24  0443 08/17/24  1616   WBC AUTO x10*3/uL 10.2 7.3   HEMOGLOBIN g/dL 11.8* 15.2   HEMATOCRIT % 35.9* 46.4*   PLATELETS AUTO x10*3/uL 143* 172   NEUTROS PCT AUTO %  --  86.2   LYMPHS PCT AUTO %  --  10.0   MONOS PCT AUTO %  --  3.1   EOS PCT AUTO %  --  0.1     Results from last 72 hours   Lab Units 08/18/24  0443 08/17/24  1616   SODIUM mmol/L 142 138   POTASSIUM mmol/L 3.8 3.8   CHLORIDE mmol/L 106 99   CO2 mmol/L 26 25   BUN mg/dL 11 11   CREATININE mg/dL 0.90 0.70   GLUCOSE mg/dL 110* 115*   CALCIUM mg/dL 8.6 9.8   ANION GAP mmol/L 10 14   EGFR mL/min/1.73m*2 68 >90     Results from last 72 hours   Lab Units 08/17/24  1616   ALK PHOS U/L 94   BILIRUBIN TOTAL mg/dL 0.5   PROTEIN TOTAL g/dL 8.1*   ALT U/L 48*   AST U/L 38   ALBUMIN g/dL 4.4     Estimated Creatinine Clearance: 49.8 mL/min (by C-G formula based on SCr of 0.9 mg/dL).  No results found for: \"CRP\", \"SEDRATE\"  No results found for: \"HIV1X2\", \"HIVCONF\", \"IJCLVB1ON\"  No results found for: \"HEPCABINIT\", \"HEPCAB\", \"HCVPCRQUANT\"  Microbiology  Reviewed  Imaging  CT angio chest for pulmonary embolism    Result Date: 8/17/2024  Interpreted By:  Freddy Umanzor, STUDY: CT ANGIO CHEST FOR PULMONARY EMBOLISM;  8/17/2024 5:42 pm   INDICATION: Signs/Symptoms:tachycardia, hypoxia; r/o PE.   COMPARISON: Chest CT 09/11/2023   ACCESSION NUMBER(S): [id number]   ORDERING CLINICIAN: SIOMARA FALCON   TECHNIQUE: Helical data " acquisition of the chest was obtained after the intravenous administration of  of contrast. Images were reformatted in coronal and sagittal planes. Axial and coronal MIP images were created and reviewed.   FINDINGS: POTENTIAL LIMITATIONS OF THE STUDY: None   HEART AND VESSELS: No discrete filling defects within the adequately visualized portions of main pulmonary artery or its branches.   Main pulmonary artery and its branches are unremarkable in caliber.   The thoracic aorta is unchanged with respect to course, caliber, and contour.   No coronary artery calcifications are seen.The study is not optimized for evaluation of coronary arteries.   The cardiac chambers are not enlarged.   No evidence of pericardial effusion.   MEDIASTINUM AND NARCISO, LOWER NECK AND AXILLA: Peripherally calcified 1.6 cm right thyroid nodule, unchanged in size.   No evidence of thoracic lymphadenopathy by CT criteria.Subcentimeter mediastinal lymph nodes are present, nonspecific.   Esophagus appears within normal limits as seen.   LUNGS AND AIRWAYS: The trachea and central airways are patent. No endobronchial lesion.   There is been significant interval increased perihilar confluent opacification with air bronchograms noted centrally and extensive nodular and patchy airspace opacities extending into the bilateral lower lobes as well as opacification of the right paramediastinal region anteriorly within the right upper lobe. New patchy and nodular airspace opacities of the right middle lobe.   No pneumothorax or effusion.   UPPER ABDOMEN: Cholelithiasis. Remaining subdiaphragmatic findings appear stable.   CHEST WALL AND OSSEOUS STRUCTURES: No acute osseous abnormality.Multilevel degenerative changes are noted throughout the imaged spine.       1.  No evidence of pulmonary embolus. 2. Extensive bilateral bilateral lung opacification in predominantly perihilar distribution with patchy and nodular airspace opacities extending to the bilateral  lower lobes and right middle lobe. 3. Remaining intrathoracic findings appear stable since comparison imaging 09/11/2023.     Signed by: Freddy Umanzor 8/17/2024 6:52 PM Dictation workstation:   [id number]    XR chest 1 view    Result Date: 8/17/2024  Interpreted By:  Deepak Weeks, STUDY: XR CHEST 1 VIEW;  8/17/2024 4:30 pm   INDICATION: Signs/Symptoms:hypoxic.   COMPARISON: None.   ACCESSION NUMBER(S): [id number]   ORDERING CLINICIAN: ANTONY PERAZA   FINDINGS: Heart size borderline enlarged. Suspect some degree of mild underlying vascular congestion. There is some patchy atelectasis or infiltrate in the lung bases.   No pneumothorax or significant effusion.       Bibasilar patchy infiltrate or atelectasis.   MACRO: None   Signed by: Deepak Weeks 8/17/2024 5:16 PM Dictation workstation:   [id number]     Assessment/Plan   Acute hypoxic respiratory failure  Aspiration pneumonia  Dysphagia  Abnormal CT-bilateral lung opacification  History of lung cancer, in remission    Continue Zosyn  Oxygen as needed  Pulmonary consult-abnormal CT chest with history of lung cancer  Follow-up blood cultures  Supportive care  Monitor temperature and WBC    Julio César Conley MD

## 2024-08-18 NOTE — CARE PLAN
The patient's goals for the shift include      The clinical goals for the shift include swallow eval    Over the shift, the patient did not make progress toward the following goals. Barriers to progression include . Recommendations to address these barriers include   Problem: Pain - Adult  Goal: Verbalizes/displays adequate comfort level or baseline comfort level  Outcome: Progressing     Problem: Safety - Adult  Goal: Free from fall injury  Outcome: Progressing     Problem: Discharge Planning  Goal: Discharge to home or other facility with appropriate resources  Outcome: Progressing     Problem: Chronic Conditions and Co-morbidities  Goal: Patient's chronic conditions and co-morbidity symptoms are monitored and maintained or improved  Outcome: Progressing     Problem: Aspiration  Goal: I will be free from signs and symptoms of aspiration or complications if aspiration has occurred  Outcome: Progressing   .

## 2024-08-18 NOTE — NURSING NOTE
Assumed care of patient.  Patient in bed resting.  Bedside shift report received.   POX 96% on RA.  Call light in reach.

## 2024-08-18 NOTE — PROGRESS NOTES
Teresa Max is a 73 y.o. female on day 1 of admission presenting with aspiration pne and acute hypoxic respiratory failure.     08/18/24 1101   Current Planned Discharge Disposition   Current Planned Discharge Disposition Home     Patient lives with her  Jimmy in a multi-level house. No use of assistive devices, no difficulty using the stairs. She is independent with ADLs, daily tasks, and drives. No tobacco use, minimal alcohol use. PCP is Dr Bingham.   ADOD 08/22/2024  Plan is to discharge home with no needs, family will transport.      Johana Ramirez RN-BSN

## 2024-08-19 ENCOUNTER — APPOINTMENT (OUTPATIENT)
Dept: RADIOLOGY | Facility: HOSPITAL | Age: 74
End: 2024-08-19
Payer: MEDICARE

## 2024-08-19 LAB
ANION GAP SERPL CALC-SCNC: 11 MMOL/L
BUN SERPL-MCNC: 15 MG/DL (ref 8–25)
CALCIUM SERPL-MCNC: 8.9 MG/DL (ref 8.5–10.4)
CHLORIDE SERPL-SCNC: 107 MMOL/L (ref 97–107)
CO2 SERPL-SCNC: 25 MMOL/L (ref 24–31)
CREAT SERPL-MCNC: 0.8 MG/DL (ref 0.4–1.6)
EGFRCR SERPLBLD CKD-EPI 2021: 79 ML/MIN/1.73M*2
ERYTHROCYTE [DISTWIDTH] IN BLOOD BY AUTOMATED COUNT: 13 % (ref 11.5–14.5)
GLUCOSE BLD MANUAL STRIP-MCNC: 102 MG/DL (ref 74–99)
GLUCOSE BLD MANUAL STRIP-MCNC: 94 MG/DL (ref 74–99)
GLUCOSE SERPL-MCNC: 105 MG/DL (ref 65–99)
HCT VFR BLD AUTO: 36.2 % (ref 36–46)
HGB BLD-MCNC: 11.6 G/DL (ref 12–16)
MCH RBC QN AUTO: 26.7 PG (ref 26–34)
MCHC RBC AUTO-ENTMCNC: 32 G/DL (ref 32–36)
MCV RBC AUTO: 83 FL (ref 80–100)
NRBC BLD-RTO: 0 /100 WBCS (ref 0–0)
PLATELET # BLD AUTO: 134 X10*3/UL (ref 150–450)
POTASSIUM SERPL-SCNC: 3.6 MMOL/L (ref 3.4–5.1)
RBC # BLD AUTO: 4.34 X10*6/UL (ref 4–5.2)
SODIUM SERPL-SCNC: 143 MMOL/L (ref 133–145)
WBC # BLD AUTO: 6.2 X10*3/UL (ref 4.4–11.3)

## 2024-08-19 PROCEDURE — 80048 BASIC METABOLIC PNL TOTAL CA: CPT | Performed by: NURSE PRACTITIONER

## 2024-08-19 PROCEDURE — 2500000002 HC RX 250 W HCPCS SELF ADMINISTERED DRUGS (ALT 637 FOR MEDICARE OP, ALT 636 FOR OP/ED): Performed by: INTERNAL MEDICINE

## 2024-08-19 PROCEDURE — 94640 AIRWAY INHALATION TREATMENT: CPT

## 2024-08-19 PROCEDURE — 2500000001 HC RX 250 WO HCPCS SELF ADMINISTERED DRUGS (ALT 637 FOR MEDICARE OP): Performed by: NURSE PRACTITIONER

## 2024-08-19 PROCEDURE — 36415 COLL VENOUS BLD VENIPUNCTURE: CPT | Performed by: NURSE PRACTITIONER

## 2024-08-19 PROCEDURE — 74230 X-RAY XM SWLNG FUNCJ C+: CPT

## 2024-08-19 PROCEDURE — 74230 X-RAY XM SWLNG FUNCJ C+: CPT | Performed by: RADIOLOGY

## 2024-08-19 PROCEDURE — 85027 COMPLETE CBC AUTOMATED: CPT | Performed by: NURSE PRACTITIONER

## 2024-08-19 PROCEDURE — 2500000004 HC RX 250 GENERAL PHARMACY W/ HCPCS (ALT 636 FOR OP/ED): Performed by: FAMILY MEDICINE

## 2024-08-19 PROCEDURE — 82947 ASSAY GLUCOSE BLOOD QUANT: CPT

## 2024-08-19 PROCEDURE — 2500000004 HC RX 250 GENERAL PHARMACY W/ HCPCS (ALT 636 FOR OP/ED): Performed by: NURSE PRACTITIONER

## 2024-08-19 PROCEDURE — 2500000005 HC RX 250 GENERAL PHARMACY W/O HCPCS: Performed by: NURSE PRACTITIONER

## 2024-08-19 PROCEDURE — 92611 MOTION FLUOROSCOPY/SWALLOW: CPT | Mod: GN | Performed by: SPEECH-LANGUAGE PATHOLOGIST

## 2024-08-19 PROCEDURE — 2060000001 HC INTERMEDIATE ICU ROOM DAILY

## 2024-08-19 RX ORDER — BENZONATATE 100 MG/1
100 CAPSULE ORAL 3 TIMES DAILY PRN
Status: DISCONTINUED | OUTPATIENT
Start: 2024-08-19 | End: 2024-08-23 | Stop reason: HOSPADM

## 2024-08-19 RX ORDER — IPRATROPIUM BROMIDE AND ALBUTEROL SULFATE 2.5; .5 MG/3ML; MG/3ML
3 SOLUTION RESPIRATORY (INHALATION)
Status: DISCONTINUED | OUTPATIENT
Start: 2024-08-19 | End: 2024-08-20

## 2024-08-19 ASSESSMENT — PAIN DESCRIPTION - LOCATION: LOCATION: ABDOMEN

## 2024-08-19 ASSESSMENT — COGNITIVE AND FUNCTIONAL STATUS - GENERAL
MOBILITY SCORE: 24
MOBILITY SCORE: 24
DAILY ACTIVITIY SCORE: 24
DAILY ACTIVITIY SCORE: 24

## 2024-08-19 ASSESSMENT — PAIN SCALES - GENERAL
PAINLEVEL_OUTOF10: 0 - NO PAIN
PAINLEVEL_OUTOF10: 0 - NO PAIN
PAINLEVEL_OUTOF10: 4

## 2024-08-19 ASSESSMENT — PAIN - FUNCTIONAL ASSESSMENT
PAIN_FUNCTIONAL_ASSESSMENT: 0-10

## 2024-08-19 ASSESSMENT — PAIN DESCRIPTION - ORIENTATION: ORIENTATION: RIGHT;MID;LEFT

## 2024-08-19 NOTE — CARE PLAN
The patient's goals for the shift include      The clinical goals for the shift include manage pain    Over the shift, the patient did not make progress toward the following goals. Barriers to progression include . Recommendations to address these barriers include   Problem: Pain - Adult  Goal: Verbalizes/displays adequate comfort level or baseline comfort level  Outcome: Progressing     Problem: Safety - Adult  Goal: Free from fall injury  Outcome: Progressing     Problem: Discharge Planning  Goal: Discharge to home or other facility with appropriate resources  Outcome: Progressing     Problem: Chronic Conditions and Co-morbidities  Goal: Patient's chronic conditions and co-morbidity symptoms are monitored and maintained or improved  Outcome: Progressing     Problem: Aspiration  Goal: I will be free from signs and symptoms of aspiration or complications if aspiration has occurred  Outcome: Progressing   .

## 2024-08-19 NOTE — NURSING NOTE
Spoke w/ MD. Consult to pulmonology planned based on findings from chest CT. Will continue to monitor respiratory status

## 2024-08-19 NOTE — NURSING NOTE
"Pt coughing and lung pain still persists. Pt states \"not bad while I am lying down\". MD notified, discussed cough suppressant and expectorant options. Encouraged and educated pt to keep ambulating in room and to BR in order to mobilize secretions.  "

## 2024-08-19 NOTE — PROCEDURES
Speech-Language Pathology    Speech-Language Pathology    Inpatient Modified Barium Swallow Study    Patient Name: Teresa Max  MRN: [id number]  : 1950  Today's Date: 24  Time Calculation  Start Time: 1400  Stop Time: 1430  Time Calculation (min): 30 min          Modified Barium Swallow Study completed. Informed verbal consent obtained prior to completion of exam. Trials of thin, nectar/mildly thick liquid, puree, regular solids and barium tablet with thin liquid were given.     SLP: VON Pedro   Contact info: [employer]; phone: [phone]      Reason for Referral: r/o aspiration  Patient Hx: presents to the ED from home after a choking episode while eating pizza around 1300 today.. Her  is at bedside and states that she has been having increasing choking episodes more often,past medical history of hypertension, GERD, hyperlipidemia, and history of lung cancer with radiation treatment and has been in remission for 8 years.  Respiratory Status: Room air  Current diet:     Pain:  Pain Scale: 0-10  Ratin  FINAL SPEECH RECOMMENDATIONS    DIET:   - Regular (IDDSI Level 7)  - Thin liquids (IDDSI Level 0)  Per the results of today's MBSS, patient to continue baseline diet of regular consistencies and thin liquids following swallow strategies listed below:    STRATEGIES:  - Small bites  - Alternate consistencies  - Upright for all PO intake  - Reflux Precautions      Plan:  Treatment/Interventions: Pharyngeal exercises, Patient/family education, Bolus trials, Compensatory strategy training, Diet tolerance/advancement  SLP Plan: Skilled SLP warranted  SLP Frequency: 1-2 follow up sessions  Duration:    Discussed POC: Patient  Discussed Risks/Benefits: Yes  Patient/Caregiver Agreeable: Yes    Short term goals established 24:   - Patient will tolerate baseline/recommended PO diet without overt s/s of aspiration, further difficulty, or concern for aspiration-related  complications in 90% of observed therapeutic trials.  - Patient will implement safe swallowing strategies to reduce risk of aspiration and other s/sx of dysphagia in 90% of trials given caregiver assistance/cueing as needed.      Long term goals 08/19/24:   Patient will tolerate the least restrictive diet without overt difficulty or further pulmonary compromise by time of discharge.    Education Provided: Results and recommendations per MBSS, with video review; recommendations and POC at this time. Verbal understanding and agreement given on all accounts.     Additional consult suggested: GI consult d/t esophageal retention     Mechanics of the Swallow Summary:  ORAL PHASE: WFL    PHARYNGEAL PHASE: WFL  ESOPHAGEAL PHASE:  Esophageal clearance - Esophageal retention     SLP Impressions with Severity Rating:   Pt presents with no oropharyngeal swallow WFL upon completion of modified barium swallow study this date. Patient demonstrated no  penetration and no aspiration was visualized during study.     OUTCOME MEASURES:  Functional Oral Intake Scale  Functional Oral Intake Scale: Level 7        total oral diet with no restrictions     Rosenbek's Penetration Aspiration Scale  Thin Liquids: 1. NO ASPIRATION & NO PENETRATION - no aspiration, contrast does not enter airway  M[place]by Thick Liquids: 1. NO ASPIRATION & NO PENETRATION - no aspiration, contrast does not enter airway  Puree: 1. NO ASPIRATION & NO PENETRATION - no aspiration, contrast does not enter airway  Solids: 1. NO ASPIRATION & NO PENETRATION - no aspiration, contrast does not enter airway

## 2024-08-19 NOTE — NURSING NOTE
Pt placed on 2L NC d/t coughing episode following barium swallow. Pt c/o lung pain when taking deep breaths and while coughing.

## 2024-08-19 NOTE — PROGRESS NOTES
Teresa Max is a 73 y.o. female on day 2 of admission presenting with No Principal Problem: There is no principal problem currently on the Problem List. Please update the Problem List and refresh..    Subjective   Interval History:   Patient seen and examined  Friend present  No significant cough  No chest pain  Awaiting swallow evaluation    Review of Systems   All other systems reviewed and are negative.      Objective   Range of Vitals (last 24 hours)  Heart Rate:  [82-98]   Temp:  [36.1 °C (97 °F)-36.4 °C (97.5 °F)]   Resp:  [16-24]   BP: (110-129)/(58-72)   Weight:  [55.6 kg (122 lb 9.2 oz)]   SpO2:  [90 %-95 %]   Daily Weight  08/19/24 : 55.6 kg (122 lb 9.2 oz)    Body mass index is 23.16 kg/m².    Physical Exam  Constitutional:       Appearance: Normal appearance.   HENT:      Head: Normocephalic and atraumatic.      Nose: Nose normal.   Eyes:      General: No scleral icterus.     Extraocular Movements: Extraocular movements intact.      Conjunctiva/sclera: Conjunctivae normal.   Cardiovascular:      Rate and Rhythm: Normal rate and regular rhythm.      Heart sounds: Normal heart sounds.   Pulmonary:      Breath sounds: Decreased breath sounds present.   Abdominal:      General: Bowel sounds are normal.      Palpations: Abdomen is soft.      Tenderness: There is no abdominal tenderness.   Musculoskeletal:      Cervical back: Normal range of motion and neck supple.      Right lower leg: No edema.      Left lower leg: No edema.   Skin:     General: Skin is warm and dry.   Neurological:      Mental Status: She is alert.   Psychiatric:         Behavior: Behavior is cooperative.     Antibiotics  piperacillin-tazobactam - 4.5 gram/100 mL    Relevant Results  Labs  Results from last 72 hours   Lab Units 08/19/24  0455 08/18/24  0443 08/17/24  1616   WBC AUTO x10*3/uL 6.2 10.2 7.3   HEMOGLOBIN g/dL 11.6* 11.8* 15.2   HEMATOCRIT % 36.2 35.9* 46.4*   PLATELETS AUTO x10*3/uL 134* 143* 172   NEUTROS PCT AUTO %  --    "--  86.2   LYMPHS PCT AUTO %  --   --  10.0   MONOS PCT AUTO %  --   --  3.1   EOS PCT AUTO %  --   --  0.1     Results from last 72 hours   Lab Units 24  0455 24  0443 24  1616   SODIUM mmol/L 143 142 138   POTASSIUM mmol/L 3.6 3.8 3.8   CHLORIDE mmol/L 107 106 99   CO2 mmol/L 25 26 25   BUN mg/dL 15 11 11   CREATININE mg/dL 0.80 0.90 0.70   GLUCOSE mg/dL 105* 110* 115*   CALCIUM mg/dL 8.9 8.6 9.8   ANION GAP mmol/L 11 10 14   EGFR mL/min/1.73m*2 79 68 >90     Results from last 72 hours   Lab Units 24  1616   ALK PHOS U/L 94   BILIRUBIN TOTAL mg/dL 0.5   PROTEIN TOTAL g/dL 8.1*   ALT U/L 48*   AST U/L 38   ALBUMIN g/dL 4.4     Estimated Creatinine Clearance: 48.7 mL/min (by C-G formula based on SCr of 0.8 mg/dL).  No results found for: \"CRP\"  Microbiology  Reviewed  Imaging  FL modified barium swallow study    Result Date: 2024  Interpreted By:  Sandee Baldwin and Brodsky Sheryl STUDY: FL MODIFIED BARIUM SWALLOW STUDY;; 2024 1:55 pm   INDICATION: Signs/Symptoms:aspiration pneumonia.   COMPARISON: None.   ACCESSION NUMBER(S): [id number]   ORDERING CLINICIAN: MARIE DURANT   SPEECH FINDINGS: Modified Barium Swallow Study completed. Informed verbal consent obtained prior to completion of exam. Trials of thin, nectar/mildly thick liquid, puree, regular solids and barium tablet with thin liquid were given.   SLP: VON Pedro Contact info: [employer] secure chat; phone: [phone]     Reason for Referral: r/o aspiration Patient Hx: presents to the ED from home after a choking episode while eating pizza around 1300 today.. Her  is at bedside and states that she has been having increasing choking episodes more often,past medical history of hypertension, GERD, hyperlipidemia, and history of lung cancer with radiation treatment and has been in remission for 8 years. Respiratory Status: Room air Current diet:   Pain: Pain Scale: 0-10 Ratin FINAL SPEECH " RECOMMENDATIONS   DIET: - Regular (IDDSI Level 7) - Thin liquids (IDDSI Level 0) Per the results of today's MBSS, patient to continue baseline diet of regular consistencies and thin liquids following swallow strategies listed below:   STRATEGIES: - Small bites - Alternate consistencies - Upright for all PO intake - Reflux Precautions     Plan: Treatment/Interventions: Pharyngeal exercises, Patient/family education, Bolus trials, Compensatory strategy training, Diet tolerance/advancement SLP Plan: Skilled SLP warranted SLP Frequency: 1-2 follow up sessions Duration:   Discussed POC: Patient Discussed Risks/Benefits: Yes Patient/Caregiver Agreeable: Yes   Short term goals established 08/19/24: - Patient will tolerate baseline/recommended PO diet without overt s/s of aspiration, further difficulty, or concern for aspiration-related complications in 90% of observed therapeutic trials. - Patient will implement safe swallowing strategies to reduce risk of aspiration and other s/sx of dysphagia in 90% of trials given caregiver assistance/cueing as needed.     Long term goals 08/19/24: Patient will tolerate the least restrictive diet without overt difficulty or further pulmonary compromise by time of discharge.   Education Provided: Results and recommendations per MBSS, with video review; recommendations and POC at this time. Verbal understanding and agreement given on all accounts.   Additional consult suggested: GI consult d/t esophageal retention   Mechanics of the Swallow Summary: ORAL PHASE: WFL   PHARYNGEAL PHASE: WFL ESOPHAGEAL PHASE: Esophageal clearance - Esophageal retention   SLP Impressions with Severity Rating: Pt presents with no oropharyngeal swallow WFL upon completion of modified barium swallow study this date. Patient demonstrated no penetration and no aspiration was visualized during study.   OUTCOME MEASURES: Functional Oral Intake Scale Functional Oral Intake Scale: Level 7        total oral diet with  no restrictions   Rosenbek's Penetration Aspiration Scale Thin Liquids: 1. NO ASPIRATION & NO PENETRATION - no aspiration, contrast does not enter airway Downs Thick Liquids: 1. NO ASPIRATION & NO PENETRATION - no aspiration, contrast does not enter airway Puree: 1. NO ASPIRATION & NO PENETRATION - no aspiration, contrast does not enter airway Solids: 1. NO ASPIRATION & NO PENETRATION - no aspiration, contrast does not enter airway Speech Therapy section of this report signed by Lynn Fitch MA-CCC/SLP on 8/19/2024 at 3:47 pm.   RADIOLOGY FINDINGS: Included portions visualized cervical spine demonstrate moderate multilevel anterior osteophyte formation C3/4 through C5/6.         1. Modified barium swallow as described above   MACRO: None   Signed by: Sandee Baldwin 8/19/2024 4:58 PM Dictation workstation:   [id number]    ECG 12 lead    Result Date: 8/19/2024  Sinus tachycardia Left axis deviation Left ventricular hypertrophy with repolarization abnormality ( R in aVL , Luis product ) Cannot rule out Septal infarct , age undetermined Abnormal ECG When compared with ECG of 05-MAR-2018 17:28, Premature ventricular complexes are no longer Present Minimal criteria for Septal infarct are now Present    CT angio chest for pulmonary embolism    Result Date: 8/17/2024  Interpreted By:  Freddy Umanzor, STUDY: CT ANGIO CHEST FOR PULMONARY EMBOLISM;  8/17/2024 5:42 pm   INDICATION: Signs/Symptoms:tachycardia, hypoxia; r/o PE.   COMPARISON: Chest CT 09/11/2023   ACCESSION NUMBER(S): [id number]   ORDERING CLINICIAN: SIOMARA FALCON   TECHNIQUE: Helical data acquisition of the chest was obtained after the intravenous administration of  of contrast. Images were reformatted in coronal and sagittal planes. Axial and coronal MIP images were created and reviewed.   FINDINGS: POTENTIAL LIMITATIONS OF THE STUDY: None   HEART AND VESSELS: No discrete filling defects within the adequately visualized portions of main pulmonary artery  or its branches.   Main pulmonary artery and its branches are unremarkable in caliber.   The thoracic aorta is unchanged with respect to course, caliber, and contour.   No coronary artery calcifications are seen.The study is not optimized for evaluation of coronary arteries.   The cardiac chambers are not enlarged.   No evidence of pericardial effusion.   MEDIASTINUM AND NARCISO, LOWER NECK AND AXILLA: Peripherally calcified 1.6 cm right thyroid nodule, unchanged in size.   No evidence of thoracic lymphadenopathy by CT criteria.Subcentimeter mediastinal lymph nodes are present, nonspecific.   Esophagus appears within normal limits as seen.   LUNGS AND AIRWAYS: The trachea and central airways are patent. No endobronchial lesion.   There is been significant interval increased perihilar confluent opacification with air bronchograms noted centrally and extensive nodular and patchy airspace opacities extending into the bilateral lower lobes as well as opacification of the right paramediastinal region anteriorly within the right upper lobe. New patchy and nodular airspace opacities of the right middle lobe.   No pneumothorax or effusion.   UPPER ABDOMEN: Cholelithiasis. Remaining subdiaphragmatic findings appear stable.   CHEST WALL AND OSSEOUS STRUCTURES: No acute osseous abnormality.Multilevel degenerative changes are noted throughout the imaged spine.       1.  No evidence of pulmonary embolus. 2. Extensive bilateral bilateral lung opacification in predominantly perihilar distribution with patchy and nodular airspace opacities extending to the bilateral lower lobes and right middle lobe. 3. Remaining intrathoracic findings appear stable since comparison imaging 09/11/2023.     Signed by: Freddy Umanzor 8/17/2024 6:52 PM Dictation workstation:   [id number]    XR chest 1 view    Result Date: 8/17/2024  Interpreted By:  Deepak Weeks, STUDY: XR CHEST 1 VIEW;  8/17/2024 4:30 pm   INDICATION: Signs/Symptoms:hypoxic.    COMPARISON: None.   ACCESSION NUMBER(S): [id number]   ORDERING CLINICIAN: ANTONY PERAZA   FINDINGS: Heart size borderline enlarged. Suspect some degree of mild underlying vascular congestion. There is some patchy atelectasis or infiltrate in the lung bases.   No pneumothorax or significant effusion.       Bibasilar patchy infiltrate or atelectasis.   MACRO: None   Signed by: Deepak Weeks 8/17/2024 5:16 PM Dictation workstation:   [id number]       Assessment/Plan   Acute hypoxic respiratory failure, resolved, on room air  Aspiration pneumonia  Dysphagia  Abnormal CT-bilateral lung opacification  History of lung cancer, in remission     Continue Zosyn  Oxygen as needed  Pulmonary consult-abnormal CT chest with history of lung cancer  Follow-up blood cultures  Supportive care  Monitor temperature and WBC  Long-term plan is to de-escalate to Augmentin to complete total of 7 days    Julio César Conley MD

## 2024-08-19 NOTE — PROGRESS NOTES
Spiritual Care Visit    Clinical Encounter Type  Visited With: Patient  Routine Visit: Introduction  Continue Visiting: Yes         Values/Beliefs  Spiritual Requests During Hospitalization: Anointing & Communoin today    Sacramental Encounters  Communion: Patient wants communion     Nicho Ely

## 2024-08-19 NOTE — PROGRESS NOTES
08/19/24 0828   Discharge Planning   Who is requesting discharge planning? Provider   Home or Post Acute Services None   Expected Discharge Disposition Home   Does the patient need discharge transport arranged? No     Patient lives with her  Jimmy in a multi-level house.   PCP is Dr Bingham.   ADBHARATHI 08/22/2024  Plan is to discharge home with no needs, family will transport.    Safe dc plan secured home no skilled needs

## 2024-08-19 NOTE — CARE PLAN
The patient's goals for the shift include decrease stomach discomfort     The clinical goals for the shift include monitor pain, continue care plan    Over the shift, the patient did not make progress toward the following goals. Barriers to progression include pt home meds were not reconciled. Recommendations to address these barriers include finished admission assessment via RN to pt communication.

## 2024-08-19 NOTE — PROGRESS NOTES
INTERNAL MEDICINE PROGRESS NOTE      HPI:    Patient reports some improvement in breathing.  She has had intermittent dysphagia for a few weeks now.    Vital signs in last 24 hours:  Temp:  [36.1 °C (97 °F)-36.6 °C (97.9 °F)] 36.6 °C (97.9 °F)  Heart Rate:  [82-98] 88  Resp:  [16-24] 18  BP: (110-145)/(58-72) 145/68    Physical Examination:  Physical Exam      Constitutional:       Appearance: Elderly, well-built, in no distress  HENT:      Head: Normocephalic and atraumatic.   Eyes:      Extraocular Movements: Extraocular movements intact.      Pupils: Pupils are equal, round, and reactive to light.   Cardiovascular:      Rate and Rhythm: Normal rate and regular rhythm.      Pulses: Normal pulses.      Heart sounds: Normal heart sounds.   Pulmonary:      Effort: Pulmonary effort is normal.      Breath sounds: Few rhonchi, faint wheeze, diminished bases  Abdominal:      General: Abdomen is flat. Bowel sounds are normal.      Palpations: Abdomen is soft.   Musculoskeletal:         General: Normal range of motion.      Cervical back: Normal range of motion and neck supple.   Skin:     General: Skin is warm and dry.   Neurological:      General: No focal deficit present.      Mental Status: Alert and oriented x 3, speech fluent    Medications:    Current Facility-Administered Medications:     aspirin EC tablet 81 mg, 81 mg, oral, Daily, SILVIA Shine-CNP, 81 mg at 08/19/24 0854    enoxaparin (Lovenox) syringe 40 mg, 40 mg, subcutaneous, q24h, SILVIA Shine-CNP, 40 mg at 08/18/24 2054    ibuprofen tablet 400 mg, 400 mg, oral, q4h PRN, SILVIA Shine-CNP, 400 mg at 08/18/24 0846    ondansetron (Zofran) tablet 4 mg, 4 mg, oral, q8h PRN **OR** ondansetron (Zofran) injection 4 mg, 4 mg, intravenous, q8h PRN, SILVIA Shine-CNP    pantoprazole (ProtoNix) EC tablet 40 mg, 40 mg, oral, Daily before breakfast, SILVIA Shine-CNP, 40 mg at 08/19/24 0615     piperacillin-tazobactam (Zosyn) 4.5 g in dextrose (iso)  mL, 4.5 g, intravenous, q8h [facility], Kane Marin MD, Stopped at 08/19/24 0645    polyethylene glycol (Glycolax, Miralax) packet 17 g, 17 g, oral, Daily PRN, Jes Vicente, APRN-CNP, 17 g at 08/19/24 0854    pravastatin (Pravachol) tablet 80 mg, 80 mg, oral, Nightly, Jes Vicente, APRN-CNP, 80 mg at 08/18/24 2053    sodium chloride 0.9% infusion, 100 mL/hr, intravenous, Continuous, Jes Vicente APRN-CNP    traMADol (Ultram) tablet 50 mg, 50 mg, oral, q6h PRN, Jes Vicente, APRN-CNP, 50 mg at 08/18/24 1005    Laboratory Findings:  Lab Results   Component Value Date    WBC 6.2 08/19/2024    HGB 11.6 (L) 08/19/2024    HCT 36.2 08/19/2024    MCV 83 08/19/2024     (L) 08/19/2024     Lab Results   Component Value Date    INR 1.1 03/05/2018    PROTIME 11.3 03/05/2018     Lab Results   Component Value Date    GLUCOSE 105 (H) 08/19/2024    CALCIUM 8.9 08/19/2024     08/19/2024    K 3.6 08/19/2024    CO2 25 08/19/2024     08/19/2024    BUN 15 08/19/2024    CREATININE 0.80 08/19/2024       Assessment and Plan:    Aspiration pneumonia -speech therapy evaluation ongoing.  Continue with IV Zosyn, improving.  Dysphagia - as above.   Lung cancer -continue with outpatient treatment, recently stable.  Hypertension - continue with low-sodium diet      Michael Bingham MD  08/19/24  11:58 AM

## 2024-08-19 NOTE — NURSING NOTE
Assumed pt care 0700. Pt was asl;eep in bed, awakened after report and requested to use restroom. Zosyn given and stopped, IV flushed and salie locked. Pt c/o 6/10 stomach discomfort, will see what can be given. Plan for modified barium swallow today.

## 2024-08-20 LAB
ANION GAP SERPL CALC-SCNC: 11 MMOL/L
BUN SERPL-MCNC: 10 MG/DL (ref 8–25)
CALCIUM SERPL-MCNC: 9.2 MG/DL (ref 8.5–10.4)
CHLORIDE SERPL-SCNC: 106 MMOL/L (ref 97–107)
CO2 SERPL-SCNC: 26 MMOL/L (ref 24–31)
CREAT SERPL-MCNC: 0.7 MG/DL (ref 0.4–1.6)
EGFRCR SERPLBLD CKD-EPI 2021: >90 ML/MIN/1.73M*2
ERYTHROCYTE [DISTWIDTH] IN BLOOD BY AUTOMATED COUNT: 12.8 % (ref 11.5–14.5)
GLUCOSE SERPL-MCNC: 102 MG/DL (ref 65–99)
HCT VFR BLD AUTO: 36.5 % (ref 36–46)
HGB BLD-MCNC: 11.9 G/DL (ref 12–16)
MCH RBC QN AUTO: 27.2 PG (ref 26–34)
MCHC RBC AUTO-ENTMCNC: 32.6 G/DL (ref 32–36)
MCV RBC AUTO: 83 FL (ref 80–100)
NRBC BLD-RTO: 0 /100 WBCS (ref 0–0)
PLATELET # BLD AUTO: 154 X10*3/UL (ref 150–450)
POTASSIUM SERPL-SCNC: 3.7 MMOL/L (ref 3.4–5.1)
RBC # BLD AUTO: 4.38 X10*6/UL (ref 4–5.2)
SODIUM SERPL-SCNC: 143 MMOL/L (ref 133–145)
WBC # BLD AUTO: 5.2 X10*3/UL (ref 4.4–11.3)

## 2024-08-20 PROCEDURE — 2060000001 HC INTERMEDIATE ICU ROOM DAILY

## 2024-08-20 PROCEDURE — 94640 AIRWAY INHALATION TREATMENT: CPT

## 2024-08-20 PROCEDURE — 92526 ORAL FUNCTION THERAPY: CPT | Mod: GN | Performed by: SPEECH-LANGUAGE PATHOLOGIST

## 2024-08-20 PROCEDURE — 85027 COMPLETE CBC AUTOMATED: CPT | Performed by: NURSE PRACTITIONER

## 2024-08-20 PROCEDURE — 2500000001 HC RX 250 WO HCPCS SELF ADMINISTERED DRUGS (ALT 637 FOR MEDICARE OP): Performed by: INTERNAL MEDICINE

## 2024-08-20 PROCEDURE — 2500000001 HC RX 250 WO HCPCS SELF ADMINISTERED DRUGS (ALT 637 FOR MEDICARE OP): Performed by: NURSE PRACTITIONER

## 2024-08-20 PROCEDURE — 2500000004 HC RX 250 GENERAL PHARMACY W/ HCPCS (ALT 636 FOR OP/ED): Performed by: NURSE PRACTITIONER

## 2024-08-20 PROCEDURE — 2500000002 HC RX 250 W HCPCS SELF ADMINISTERED DRUGS (ALT 637 FOR MEDICARE OP, ALT 636 FOR OP/ED): Performed by: INTERNAL MEDICINE

## 2024-08-20 PROCEDURE — 80048 BASIC METABOLIC PNL TOTAL CA: CPT | Performed by: NURSE PRACTITIONER

## 2024-08-20 PROCEDURE — 2500000002 HC RX 250 W HCPCS SELF ADMINISTERED DRUGS (ALT 637 FOR MEDICARE OP, ALT 636 FOR OP/ED): Performed by: NURSE PRACTITIONER

## 2024-08-20 PROCEDURE — 36415 COLL VENOUS BLD VENIPUNCTURE: CPT | Performed by: NURSE PRACTITIONER

## 2024-08-20 PROCEDURE — 2500000004 HC RX 250 GENERAL PHARMACY W/ HCPCS (ALT 636 FOR OP/ED): Performed by: FAMILY MEDICINE

## 2024-08-20 PROCEDURE — 2500000005 HC RX 250 GENERAL PHARMACY W/O HCPCS: Performed by: NURSE PRACTITIONER

## 2024-08-20 PROCEDURE — 9420000001 HC RT PATIENT EDUCATION 5 MIN

## 2024-08-20 PROCEDURE — 94664 DEMO&/EVAL PT USE INHALER: CPT

## 2024-08-20 RX ORDER — IPRATROPIUM BROMIDE AND ALBUTEROL SULFATE 2.5; .5 MG/3ML; MG/3ML
3 SOLUTION RESPIRATORY (INHALATION) EVERY 4 HOURS PRN
Status: DISCONTINUED | OUTPATIENT
Start: 2024-08-20 | End: 2024-08-21

## 2024-08-20 RX ORDER — IPRATROPIUM BROMIDE AND ALBUTEROL SULFATE 2.5; .5 MG/3ML; MG/3ML
3 SOLUTION RESPIRATORY (INHALATION)
Status: DISCONTINUED | OUTPATIENT
Start: 2024-08-20 | End: 2024-08-20

## 2024-08-20 ASSESSMENT — PAIN - FUNCTIONAL ASSESSMENT
PAIN_FUNCTIONAL_ASSESSMENT: 0-10

## 2024-08-20 ASSESSMENT — COGNITIVE AND FUNCTIONAL STATUS - GENERAL
DAILY ACTIVITIY SCORE: 24
DAILY ACTIVITIY SCORE: 24
MOBILITY SCORE: 24
MOBILITY SCORE: 24

## 2024-08-20 ASSESSMENT — PAIN SCALES - GENERAL
PAINLEVEL_OUTOF10: 2
PAINLEVEL_OUTOF10: 6
PAINLEVEL_OUTOF10: 0 - NO PAIN

## 2024-08-20 ASSESSMENT — PAIN DESCRIPTION - ORIENTATION
ORIENTATION: RIGHT;LEFT
ORIENTATION: MID

## 2024-08-20 ASSESSMENT — PAIN DESCRIPTION - LOCATION
LOCATION: OTHER (COMMENT)
LOCATION: CHEST

## 2024-08-20 ASSESSMENT — PAIN DESCRIPTION - DESCRIPTORS: DESCRIPTORS: ACHING

## 2024-08-20 NOTE — CONSULTS
Pulmonary Consult Note    Chief Complaint   Patient presents with    Shortness of Breath     Since this morning patient has been having shortness of breath, vomiting, and chills.  Patient states this morning she choked on pizza and this started after.         Reason for consultation:  Abnormal chest CT, pneumonia    History Of Present Illness  Teresa Max is a 73 y.o. female presenting with shortness of breath.  She has a history of lung cancer and follows with Dr. Kincaid.  She was evaluated recently and March of this year and there is no evidence for recurrent disease.  2 days ago she started describing sensation of feeling weak and short of breath.  On the day of presentation she had a choking episode while eating pizza.  She began to feel chills and sweats thereafter.  Subsequently with significant shortness of breath that prompted her to seek medical attention.  She was found to be hypoxic and placed on supplemental oxygen and initiated on piperacillin/tazobactam.  Chest CT was performed which showed extensive bilateral lung opacification.  Were asked to assist in her management.  She has since had interval improvement and is actually off any supplemental oxygen.     Past Medical History  Per review of Dr. Kincaid's note:  Stage IIIb([id number]) lung adenocarcinoma of OL[place] dx in 05/2015 s/p concurrent chemoRTàmetastatic recurrence  in 01/2016 with brain lesion, axillary LNs s/p gamma knife, alectinib 04/2016-06/2018-->again recurrence in 06/2018 s/p keytruda  06/2018-08/2020.   DCISof L breast in 2008, HTN, HLD       Surgical History  She has a past surgical history that includes Other surgical history (02/17/2016); Breast lumpectomy (02/17/2016); Other surgical history (07/21/2021); Other surgical history (02/16/2018); CT guided imaging for needle placement (05/22/2015); Cataract extraction (Right); Intraocular lens insertion; US guided thyroid biopsy (11/07/2023); Colonoscopy; and Upper gastrointestinal endoscopy  "(2023).     Social History  She reports that she has never smoked. She has never used smokeless tobacco. She reports current alcohol use. She reports that she does not use drugs.    Family History  Family History   Problem Relation Name Age of Onset    Other (brain tumor) Mother      Cancer Mother          Allergies  Acetaminophen, Azithromycin, Cpm-pseudoephed-acetaminophen, Guaifenesin, Hydrocodone, Osmyqklla-kp-gjwwfvzl-guaifen, and Ceftriaxone    Review of Systems   All other systems reviewed and are negative.      Last Recorded Vitals  Blood pressure 118/71, pulse 88, temperature 36.4 °C (97.5 °F), temperature source Temporal, resp. rate 17, height 1.549 m (5' 1\"), weight 54.9 kg (121 lb 0.5 oz), SpO2 95%.     Physical Exam  Vitals reviewed.   Constitutional:       General: She is not in acute distress.     Appearance: She is not ill-appearing.   HENT:      Head: Normocephalic and atraumatic.      Nose: Nose normal.      Mouth/Throat:      Mouth: Mucous membranes are moist.      Pharynx: Oropharynx is clear.   Eyes:      General: No scleral icterus.     Conjunctiva/sclera: Conjunctivae normal.   Cardiovascular:      Rate and Rhythm: Normal rate and regular rhythm.   Pulmonary:      Effort: Pulmonary effort is normal.      Breath sounds: Rales present.   Musculoskeletal:      Right lower leg: No edema.      Left lower leg: No edema.   Skin:     General: Skin is warm and dry.   Neurological:      General: No focal deficit present.      Mental Status: She is alert.   Psychiatric:         Mood and Affect: Mood normal.         Behavior: Behavior normal.         Meds  Scheduled medications  aspirin, 81 mg, oral, Daily  enoxaparin, 40 mg, subcutaneous, q24h  ipratropium-albuteroL, 3 mL, nebulization, BID  pantoprazole, 40 mg, oral, Daily before breakfast  piperacillin-tazobactam, 4.5 g, intravenous, q8h MONIE  pravastatin, 80 mg, oral, Nightly      Continuous medications  sodium chloride 0.9%, 100 mL/hr      PRN " medications  PRN medications: benzonatate, ibuprofen, ondansetron **OR** ondansetron, polyethylene glycol, traMADol       Relevant Results  CBC and BMP reviewed  Blood culture showed no growth to date  CT of the chest was personally reviewed and agree no pulmonary embolism.  This was compared to prior CT in March.  There does appear to agree to be extensive bilateral lung opacifications with both patchy and nodular type changes in both lower lobes and right middle lobe.  This is definitely new and more pronounced compared to March 2024 CT.  March 2024 CT was also reviewed and showed stable changes with perihilar opacification    Active Problems:  There are no active Hospital Problems.       Recommendations  Acute respiratory failure with hypoxia: Largely resolved and likely in the setting of aspiration pneumonitis  Encourage incentive spirometry and ambulation  Monitor room air oxygen needs.  Anticipate likely will not need home-going oxygen  Aspiration pneumonia  Antibiotics per ID  History of lung cancer: In remission  Current changes are more in keeping with infectious insult.  With that being said, obligated to follow-up to resolution with repeat chest imaging in 6 to 8 weeks  Abnormal CT  Would recommend following up to resolution especially in the setting of prolonged cancer history  Pulmonary disposition  By all accounts appears much improved which may speak to aspiration pneumonitis.  If remains clinically quiescent and off oxygen in the next 24 hours, no absolute contraindication for evaluation for discharge likely in the morning    Thank you for this consultation.  Discussed with infectious disease.    Discussed with the patient, all questions answered         Carlie Josue MD  Pulmonary/Critical Care Physician

## 2024-08-20 NOTE — PROGRESS NOTES
Spiritual Care Visit    Clinical Encounter Type  Visited With: Patient  Routine Visit: Follow-up  Continue Visiting: Yes         Values/Beliefs  Spiritual Requests During Hospitalization: Communion today    Sacramental Encounters  Communion: Patient wants communion  Communion Given Indicator: Yes     Nicho Ely

## 2024-08-20 NOTE — PROGRESS NOTES
Speech-Language Pathology    Speech-Language Pathology Clinical Swallow Treatment    Patient Name: Teresa Max  MRN: [id number]  : 1950  Today's Date: 24  Start Time: 1350  Stop Time: 1410  Time Calculation (min): 20 min    ASSESSMENT  SLP TX Intervention Outcome: Making Progress Towards Goals  Treatment Tolerance: Patient tolerated treatment well    Impressions: Pt safely tolerating regular solids and thin liquid without s/s aspiration, No furthr choking episodes. Pt able to demon independent use of safe swallow strategies and  No further tx indicated @ this time.    PLAN  Recommended solid consistency: Regular (IDDSI level 7)  Recommended liquid consistency: Thin (IDDSI 0)  Recommended medication administration: Whole, in thin liquid  Safe swallow strategies:  - Upright positioning for all PO intake  - Remain upright for >30 min after meals  - Slow rate of intake  - Small bites  - Alternate bites and sips    Discharge recommendation: Home with no further SLP  Inpatient/Swing Bed or Outpatient: Inpatient  SLP TX Plan: Discharge from Speech Therapy  SLP Plan: No skilled SLP        Next Treatment Priority: diet to, strat        SUBJECTIVE  Prior to Session Communication: Bedside nurse  RN cleared pt to participate in session and reported no difficulty observed with swallowing today, takes pills ok , she told MD about recommendation for GI consult  Respiratory status: Room air  Positioning: Upright in bed  Pt was alert, pleasant, and cooperative for session.    Pain Assessment  Pain Assessment: 0-10  0-10 (Numeric) Pain Score: 0 - No pain       Orientation: Oriented to situation and Ox4  Ability to follow functional commands: WFL    OBJECTIVE  Therapeutic Swallow  Therapeutic Swallow Intervention : PO Trials, Compensatory Strategies  Short term goals established 24:   - Patient will tolerate baseline/recommended PO diet without overt s/s of aspiration, further difficulty, or concern for  aspiration-related complications in 90% of observed therapeutic trials.   GOAL START: 8/19/2024    GOAL END: 8/26/2024   Status: Goal met   Progress this date: Pt diet is currently regular and thin liquids, pt reports not difficulty with lunch, Pt consumed the following:   approx 4 oz thin liquids, and 2 swetha doon cookies   - Patient will implement safe swallowing strategies to reduce risk of aspiration and other s/sx of dysphagia in 90% of trials given caregiver assistance/cueing as needed.   GOAL START: 8/20/2024    GOAL END: 8/26/2024   Status: Goal met   Progress this date: Pt is independent with use of safe swallow strategies and comprehends the reason for strategies, pt demon use of strategies during snack, without s/s aspiration /dysphagia. Also educated on GERD percautions as pt reports h/o GERD       Treatment/Education:  Results and recommendations were relayed to: Patient and Bedside nurse  Education provided: Yes   Learner: Patient   Barriers to learning: None   Method of teaching: Verbal   Topic: role of ST and recommended safe swallow strategies, GERD percautions recommendation for GI consult   Outcome of teaching: Pt verbalized understanding

## 2024-08-20 NOTE — NURSING NOTE
Assumed pt care 0700. Pt asleep I bed. RN to RN bedside report revealed no significant events overnight other than persistent coughing and lung pain. Nightshift RN ordered breathing tx and cough suppressants to aid in pt discomfort. Pt stated tx effective. No further complaints at this time. Call light and belongings within reach.

## 2024-08-20 NOTE — CARE PLAN
Problem: Pain - Adult  Goal: Verbalizes/displays adequate comfort level or baseline comfort level  Outcome: Progressing  Flowsheets (Taken 8/19/2024 2214)  Verbalizes/displays adequate comfort level or baseline comfort level: Assess pain using appropriate pain scale     Problem: Safety - Adult  Goal: Free from fall injury  Outcome: Progressing  Flowsheets (Taken 8/19/2024 2214)  Free from fall injury: Based on caregiver fall risk screen, instruct family/caregiver to ask for assistance with transferring infant if caregiver noted to have fall risk factors

## 2024-08-20 NOTE — PROGRESS NOTES
INTERNAL MEDICINE PROGRESS NOTE      HPI:    Patient reports no shortness of breath overnight.  She had some bronchospasm and has had a cough.    Vital signs in last 24 hours:  Temp:  [35.8 °C (96.4 °F)-36.7 °C (98.1 °F)] 35.8 °C (96.4 °F)  Heart Rate:  [78-88] 78  Resp:  [17-24] 18  BP: (128-163)/(68-83) 128/79  FiO2 (%):  [32 %] 32 %    Physical Examination:  Physical Exam    Constitutional:       Appearance: Elderly, well-built, in no distress  HENT:      Head: Normocephalic and atraumatic.   Eyes:      Extraocular Movements: Extraocular movements intact.      Pupils: Pupils are equal, round, and reactive to light.   Cardiovascular:      Rate and Rhythm: Normal rate and regular rhythm.      Pulses: Normal pulses.      Heart sounds: Normal heart sounds.   Pulmonary:      Effort: Pulmonary effort is normal.      Breath sounds: Few rhonchi, faint wheeze, diminished bases  Abdominal:      General: Abdomen is flat. Bowel sounds are normal.      Palpations: Abdomen is soft.   Musculoskeletal:         General: Normal range of motion.      Cervical back: Normal range of motion and neck supple.   Skin:     General: Skin is warm and dry.   Neurological:      General: No focal deficit present.      Mental Status: Alert and oriented x 3, speech fluent    Medications:    Current Facility-Administered Medications:     aspirin EC tablet 81 mg, 81 mg, oral, Daily, SILVIA Shine-CNP, 81 mg at 08/20/24 0955    benzonatate (Tessalon) capsule 100 mg, 100 mg, oral, TID PRN, Michael Bingham MD, 100 mg at 08/20/24 0501    enoxaparin (Lovenox) syringe 40 mg, 40 mg, subcutaneous, q24h, SILVIA Shine-CNP, 40 mg at 08/19/24 2041    ibuprofen tablet 400 mg, 400 mg, oral, q4h PRN, SILVIA Shine-CNP, 400 mg at 08/20/24 0501    ipratropium-albuteroL (Duo-Neb) 0.5-2.5 mg/3 mL nebulizer solution 3 mL, 3 mL, nebulization, BID, Michael Bingham MD    ondansetron (Zofran) tablet 4 mg, 4 mg, oral, q8h PRN,  4 mg at 08/20/24 0501 **OR** ondansetron (Zofran) injection 4 mg, 4 mg, intravenous, q8h PRN, ANA Shine    pantoprazole (ProtoNix) EC tablet 40 mg, 40 mg, oral, Daily before breakfast, ANA Shine, 40 mg at 08/20/24 0612    piperacillin-tazobactam (Zosyn) 4.5 g in dextrose (iso)  mL, 4.5 g, intravenous, q8h MONIE, Kane Marin MD, Stopped at 08/20/24 0531    polyethylene glycol (Glycolax, Miralax) packet 17 g, 17 g, oral, Daily PRN, ANA Shine, 17 g at 08/19/24 0854    pravastatin (Pravachol) tablet 80 mg, 80 mg, oral, Nightly, ANA Shine, 80 mg at 08/19/24 2041    sodium chloride 0.9% infusion, 100 mL/hr, intravenous, Continuous, ANA Shine    traMADol (Ultram) tablet 50 mg, 50 mg, oral, q6h PRN, ANA Shine, 50 mg at 08/18/24 1005    Laboratory Findings:  Lab Results   Component Value Date    WBC 5.2 08/20/2024    HGB 11.9 (L) 08/20/2024    HCT 36.5 08/20/2024    MCV 83 08/20/2024     08/20/2024     Lab Results   Component Value Date    INR 1.1 03/05/2018    PROTIME 11.3 03/05/2018     Lab Results   Component Value Date    GLUCOSE 102 (H) 08/20/2024    CALCIUM 9.2 08/20/2024     08/20/2024    K 3.7 08/20/2024    CO2 26 08/20/2024     08/20/2024    BUN 10 08/20/2024    CREATININE 0.70 08/20/2024       Assessment and Plan:    Aspiration pneumonia -swallow evaluation done yesterday.  Dietary recommendations noted.  Continue antibiotics.  Dysphagia - as above.   Bronchospasm-continue with aerosol treatments, pulmonary consulted.  Hypertension -good control, same medication      Michael Bingham MD  08/20/24  11:01 AM

## 2024-08-20 NOTE — PROGRESS NOTES
Teresa Max is a 73 y.o. female on day 3 of admission presenting with No Principal Problem: There is no principal problem currently on the Problem List. Please update the Problem List and refresh..    Subjective   Interval History:   Afebrile, no chills  Difficulty with breathing overnight    Review of systems-as in HPI, otherwise unremarkable    Objective   Range of Vitals (last 24 hours)  Heart Rate:  [78-87]   Temp:  [35.8 °C (96.4 °F)-36.7 °C (98.1 °F)]   Resp:  [17-24]   BP: (128-163)/(69-83)   Weight:  [54.9 kg (121 lb 0.5 oz)]   SpO2:  [94 %-99 %]   Daily Weight  08/20/24 : 54.9 kg (121 lb 0.5 oz)    Body mass index is 22.87 kg/m².    Physical Exam  Constitutional:       Appearance: Normal appearance.   HENT:      Head: Normocephalic and atraumatic.      Nose: Nose normal.   Eyes:      General: No scleral icterus.     Extraocular Movements: Extraocular movements intact.      Conjunctiva/sclera: Conjunctivae normal.   Cardiovascular:      Rate and Rhythm: Normal rate and regular rhythm.      Heart sounds: Normal heart sounds.   Pulmonary:      Breath sounds: Decreased breath sounds present.   Abdominal:      General: Bowel sounds are normal.      Palpations: Abdomen is soft.      Tenderness: There is no abdominal tenderness.   Musculoskeletal:      Cervical back: Normal range of motion and neck supple.      Right lower leg: No edema.      Left lower leg: No edema.   Skin:     General: Skin is warm and dry.   Neurological:      Mental Status: She is alert.   Psychiatric:         Behavior: Behavior is cooperative.        Antibiotics  piperacillin-tazobactam - 4.5 gram/100 mL    Relevant Results  Labs  Results from last 72 hours   Lab Units 08/20/24  0444 08/19/24  0455 08/18/24  0443 08/17/24  1616   WBC AUTO x10*3/uL 5.2 6.2 10.2 7.3   HEMOGLOBIN g/dL 11.9* 11.6* 11.8* 15.2   HEMATOCRIT % 36.5 36.2 35.9* 46.4*   PLATELETS AUTO x10*3/uL 154 134* 143* 172   NEUTROS PCT AUTO %  --   --   --  86.2   LYMPHS PCT  "AUTO %  --   --   --  10.0   MONOS PCT AUTO %  --   --   --  3.1   EOS PCT AUTO %  --   --   --  0.1     Results from last 72 hours   Lab Units 24  0444 24  0455 24  0443   SODIUM mmol/L 143 143 142   POTASSIUM mmol/L 3.7 3.6 3.8   CHLORIDE mmol/L 106 107 106   CO2 mmol/L 26 25 26   BUN mg/dL 10 15 11   CREATININE mg/dL 0.70 0.80 0.90   GLUCOSE mg/dL 102* 105* 110*   CALCIUM mg/dL 9.2 8.9 8.6   ANION GAP mmol/L 11 11 10   EGFR mL/min/1.73m*2 >90 79 68     Results from last 72 hours   Lab Units 24  1616   ALK PHOS U/L 94   BILIRUBIN TOTAL mg/dL 0.5   PROTEIN TOTAL g/dL 8.1*   ALT U/L 48*   AST U/L 38   ALBUMIN g/dL 4.4     Estimated Creatinine Clearance: 55.6 mL/min (by C-G formula based on SCr of 0.7 mg/dL).  No results found for: \"CRP\"  Microbiology  Reviewed  Imaging  FL modified barium swallow study    Result Date: 2024  Interpreted By:  Sandee Baldwin and Brodsky Sheryl STUDY: FL MODIFIED BARIUM SWALLOW STUDY;; 2024 1:55 pm   INDICATION: Signs/Symptoms:aspiration pneumonia.   COMPARISON: None.   ACCESSION NUMBER(S): [id number]   ORDERING CLINICIAN: MARIE DURANT   SPEECH FINDINGS: Modified Barium Swallow Study completed. Informed verbal consent obtained prior to completion of exam. Trials of thin, nectar/mildly thick liquid, puree, regular solids and barium tablet with thin liquid were given.   SLP: Lynn Fitch, SLP Contact info: [employer] secure chat; phone: [phone]     Reason for Referral: r/o aspiration Patient Hx: presents to the ED from home after a choking episode while eating pizza around 1300 today.. Her  is at bedside and states that she has been having increasing choking episodes more often,past medical history of hypertension, GERD, hyperlipidemia, and history of lung cancer with radiation treatment and has been in remission for 8 years. Respiratory Status: Room air Current diet:   Pain: Pain Scale: 0-10 Ratin FINAL SPEECH RECOMMENDATIONS   " DIET: - Regular (IDDSI Level 7) - Thin liquids (IDDSI Level 0) Per the results of today's MBSS, patient to continue baseline diet of regular consistencies and thin liquids following swallow strategies listed below:   STRATEGIES: - Small bites - Alternate consistencies - Upright for all PO intake - Reflux Precautions     Plan: Treatment/Interventions: Pharyngeal exercises, Patient/family education, Bolus trials, Compensatory strategy training, Diet tolerance/advancement SLP Plan: Skilled SLP warranted SLP Frequency: 1-2 follow up sessions Duration:   Discussed POC: Patient Discussed Risks/Benefits: Yes Patient/Caregiver Agreeable: Yes   Short term goals established 08/19/24: - Patient will tolerate baseline/recommended PO diet without overt s/s of aspiration, further difficulty, or concern for aspiration-related complications in 90% of observed therapeutic trials. - Patient will implement safe swallowing strategies to reduce risk of aspiration and other s/sx of dysphagia in 90% of trials given caregiver assistance/cueing as needed.     Long term goals 08/19/24: Patient will tolerate the least restrictive diet without overt difficulty or further pulmonary compromise by time of discharge.   Education Provided: Results and recommendations per MBSS, with video review; recommendations and POC at this time. Verbal understanding and agreement given on all accounts.   Additional consult suggested: GI consult d/t esophageal retention   Mechanics of the Swallow Summary: ORAL PHASE: WFL   PHARYNGEAL PHASE: WFL ESOPHAGEAL PHASE: Esophageal clearance - Esophageal retention   SLP Impressions with Severity Rating: Pt presents with no oropharyngeal swallow WFL upon completion of modified barium swallow study this date. Patient demonstrated no penetration and no aspiration was visualized during study.   OUTCOME MEASURES: Functional Oral Intake Scale Functional Oral Intake Scale: Level 7        total oral diet with no restrictions    Rosenbek's Penetration Aspiration Scale Thin Liquids: 1. NO ASPIRATION & NO PENETRATION - no aspiration, contrast does not enter airway [place] Thick Liquids: 1. NO ASPIRATION & NO PENETRATION - no aspiration, contrast does not enter airway Puree: 1. NO ASPIRATION & NO PENETRATION - no aspiration, contrast does not enter airway Solids: 1. NO ASPIRATION & NO PENETRATION - no aspiration, contrast does not enter airway Speech Therapy section of this report signed by Lynn Fitch MA-CCC/SLP on 8/19/2024 at 3:47 pm.   RADIOLOGY FINDINGS: Included portions visualized cervical spine demonstrate moderate multilevel anterior osteophyte formation C3/4 through C5/6.         1. Modified barium swallow as described above   MACRO: None   Signed by: Sandee Baldwin 8/19/2024 4:58 PM Dictation workstation:   [id number]    ECG 12 lead    Result Date: 8/19/2024  Sinus tachycardia Left axis deviation Left ventricular hypertrophy with repolarization abnormality ( R in aVL , Luis product ) Cannot rule out Septal infarct , age undetermined Abnormal ECG When compared with ECG of 05-MAR-2018 17:28, Premature ventricular complexes are no longer Present Minimal criteria for Septal infarct are now Present    CT angio chest for pulmonary embolism    Result Date: 8/17/2024  Interpreted By:  Freddy Umanzor, STUDY: CT ANGIO CHEST FOR PULMONARY EMBOLISM;  8/17/2024 5:42 pm   INDICATION: Signs/Symptoms:tachycardia, hypoxia; r/o PE.   COMPARISON: Chest CT 09/11/2023   ACCESSION NUMBER(S): [id number]   ORDERING CLINICIAN: SIOMARA FALCON   TECHNIQUE: Helical data acquisition of the chest was obtained after the intravenous administration of  of contrast. Images were reformatted in coronal and sagittal planes. Axial and coronal MIP images were created and reviewed.   FINDINGS: POTENTIAL LIMITATIONS OF THE STUDY: None   HEART AND VESSELS: No discrete filling defects within the adequately visualized portions of main pulmonary artery or its branches.    Main pulmonary artery and its branches are unremarkable in caliber.   The thoracic aorta is unchanged with respect to course, caliber, and contour.   No coronary artery calcifications are seen.The study is not optimized for evaluation of coronary arteries.   The cardiac chambers are not enlarged.   No evidence of pericardial effusion.   MEDIASTINUM AND NARCISO, LOWER NECK AND AXILLA: Peripherally calcified 1.6 cm right thyroid nodule, unchanged in size.   No evidence of thoracic lymphadenopathy by CT criteria.Subcentimeter mediastinal lymph nodes are present, nonspecific.   Esophagus appears within normal limits as seen.   LUNGS AND AIRWAYS: The trachea and central airways are patent. No endobronchial lesion.   There is been significant interval increased perihilar confluent opacification with air bronchograms noted centrally and extensive nodular and patchy airspace opacities extending into the bilateral lower lobes as well as opacification of the right paramediastinal region anteriorly within the right upper lobe. New patchy and nodular airspace opacities of the right middle lobe.   No pneumothorax or effusion.   UPPER ABDOMEN: Cholelithiasis. Remaining subdiaphragmatic findings appear stable.   CHEST WALL AND OSSEOUS STRUCTURES: No acute osseous abnormality.Multilevel degenerative changes are noted throughout the imaged spine.       1.  No evidence of pulmonary embolus. 2. Extensive bilateral bilateral lung opacification in predominantly perihilar distribution with patchy and nodular airspace opacities extending to the bilateral lower lobes and right middle lobe. 3. Remaining intrathoracic findings appear stable since comparison imaging 09/11/2023.     Signed by: Freddy Umanzor 8/17/2024 6:52 PM Dictation workstation:   [id number]    XR chest 1 view    Result Date: 8/17/2024  Interpreted By:  Deepak Weeks, STUDY: XR CHEST 1 VIEW;  8/17/2024 4:30 pm   INDICATION: Signs/Symptoms:hypoxic.   COMPARISON: None.    ACCESSION NUMBER(S): [id number]   ORDERING CLINICIAN: ANTONY PERAZA   FINDINGS: Heart size borderline enlarged. Suspect some degree of mild underlying vascular congestion. There is some patchy atelectasis or infiltrate in the lung bases.   No pneumothorax or significant effusion.       Bibasilar patchy infiltrate or atelectasis.   MACRO: None   Signed by: Deepak Weeks 8/17/2024 5:16 PM Dictation workstation:   [id number]     Assessment/Plan     Acute hypoxic respiratory failure, resolved, on room air  Aspiration pneumonia  Dysphagia  Abnormal CT-bilateral lung opacification  History of lung cancer, in remission     Continue Zosyn  Oxygen as needed  Repeat CT chest as advised by pulmonary  Follow-up blood cultures  Supportive care  Monitor temperature and WBC  Long-term plan is to de-escalate to Augmentin to complete total of 7 days  Julio César Conley MD

## 2024-08-20 NOTE — CARE PLAN
The patient's goals for the shift include decrease lung pain     The clinical goals for the shift include free of resp. distress throughout shift, continue breathing tx    Over the shift, the patient did not make progress toward the following goals. Barriers to progression include pt being treated for pneumonia, slow recovery. Recommendations to address these barriers include continue monitoring and providing care as needed.

## 2024-08-21 ENCOUNTER — APPOINTMENT (OUTPATIENT)
Dept: CARDIOLOGY | Facility: HOSPITAL | Age: 74
End: 2024-08-21
Payer: MEDICARE

## 2024-08-21 LAB
ANION GAP SERPL CALC-SCNC: 11 MMOL/L
BASOPHILS # BLD AUTO: 0.02 X10*3/UL (ref 0–0.1)
BASOPHILS NFR BLD AUTO: 0.4 %
BUN SERPL-MCNC: 11 MG/DL (ref 8–25)
CALCIUM SERPL-MCNC: 9.5 MG/DL (ref 8.5–10.4)
CHLORIDE SERPL-SCNC: 109 MMOL/L (ref 97–107)
CO2 SERPL-SCNC: 27 MMOL/L (ref 24–31)
CREAT SERPL-MCNC: 0.8 MG/DL (ref 0.4–1.6)
EGFRCR SERPLBLD CKD-EPI 2021: 79 ML/MIN/1.73M*2
EOSINOPHIL # BLD AUTO: 0.27 X10*3/UL (ref 0–0.4)
EOSINOPHIL NFR BLD AUTO: 5.1 %
ERYTHROCYTE [DISTWIDTH] IN BLOOD BY AUTOMATED COUNT: 12.9 % (ref 11.5–14.5)
GLUCOSE SERPL-MCNC: 106 MG/DL (ref 65–99)
HCT VFR BLD AUTO: 35.1 % (ref 36–46)
HGB BLD-MCNC: 11.5 G/DL (ref 12–16)
IMM GRANULOCYTES # BLD AUTO: 0.02 X10*3/UL (ref 0–0.5)
IMM GRANULOCYTES NFR BLD AUTO: 0.4 % (ref 0–0.9)
LYMPHOCYTES # BLD AUTO: 1.44 X10*3/UL (ref 0.8–3)
LYMPHOCYTES NFR BLD AUTO: 27.3 %
MCH RBC QN AUTO: 27.2 PG (ref 26–34)
MCHC RBC AUTO-ENTMCNC: 32.8 G/DL (ref 32–36)
MCV RBC AUTO: 83 FL (ref 80–100)
MONOCYTES # BLD AUTO: 0.57 X10*3/UL (ref 0.05–0.8)
MONOCYTES NFR BLD AUTO: 10.8 %
NEUTROPHILS # BLD AUTO: 2.95 X10*3/UL (ref 1.6–5.5)
NEUTROPHILS NFR BLD AUTO: 56 %
NRBC BLD-RTO: 0 /100 WBCS (ref 0–0)
PLATELET # BLD AUTO: 159 X10*3/UL (ref 150–450)
POTASSIUM SERPL-SCNC: 3.4 MMOL/L (ref 3.4–5.1)
RBC # BLD AUTO: 4.23 X10*6/UL (ref 4–5.2)
SODIUM SERPL-SCNC: 147 MMOL/L (ref 133–145)
WBC # BLD AUTO: 5.3 X10*3/UL (ref 4.4–11.3)

## 2024-08-21 PROCEDURE — 97116 GAIT TRAINING THERAPY: CPT | Mod: GP

## 2024-08-21 PROCEDURE — 2060000001 HC INTERMEDIATE ICU ROOM DAILY

## 2024-08-21 PROCEDURE — 2500000001 HC RX 250 WO HCPCS SELF ADMINISTERED DRUGS (ALT 637 FOR MEDICARE OP): Performed by: NURSE PRACTITIONER

## 2024-08-21 PROCEDURE — 36415 COLL VENOUS BLD VENIPUNCTURE: CPT | Performed by: INTERNAL MEDICINE

## 2024-08-21 PROCEDURE — 2500000004 HC RX 250 GENERAL PHARMACY W/ HCPCS (ALT 636 FOR OP/ED): Performed by: NURSE PRACTITIONER

## 2024-08-21 PROCEDURE — 2500000001 HC RX 250 WO HCPCS SELF ADMINISTERED DRUGS (ALT 637 FOR MEDICARE OP): Performed by: INTERNAL MEDICINE

## 2024-08-21 PROCEDURE — 80048 BASIC METABOLIC PNL TOTAL CA: CPT | Performed by: INTERNAL MEDICINE

## 2024-08-21 PROCEDURE — 97161 PT EVAL LOW COMPLEX 20 MIN: CPT | Mod: GP

## 2024-08-21 PROCEDURE — 94640 AIRWAY INHALATION TREATMENT: CPT

## 2024-08-21 PROCEDURE — 94664 DEMO&/EVAL PT USE INHALER: CPT

## 2024-08-21 PROCEDURE — 9420000001 HC RT PATIENT EDUCATION 5 MIN

## 2024-08-21 PROCEDURE — 2500000004 HC RX 250 GENERAL PHARMACY W/ HCPCS (ALT 636 FOR OP/ED): Performed by: FAMILY MEDICINE

## 2024-08-21 PROCEDURE — 2500000002 HC RX 250 W HCPCS SELF ADMINISTERED DRUGS (ALT 637 FOR MEDICARE OP, ALT 636 FOR OP/ED): Performed by: NURSE PRACTITIONER

## 2024-08-21 PROCEDURE — 85025 COMPLETE CBC W/AUTO DIFF WBC: CPT | Performed by: INTERNAL MEDICINE

## 2024-08-21 PROCEDURE — 2500000005 HC RX 250 GENERAL PHARMACY W/O HCPCS: Performed by: NURSE PRACTITIONER

## 2024-08-21 PROCEDURE — 93005 ELECTROCARDIOGRAM TRACING: CPT

## 2024-08-21 RX ORDER — IPRATROPIUM BROMIDE AND ALBUTEROL SULFATE 2.5; .5 MG/3ML; MG/3ML
3 SOLUTION RESPIRATORY (INHALATION)
Status: DISCONTINUED | OUTPATIENT
Start: 2024-08-21 | End: 2024-08-23 | Stop reason: HOSPADM

## 2024-08-21 ASSESSMENT — COGNITIVE AND FUNCTIONAL STATUS - GENERAL
STANDING UP FROM CHAIR USING ARMS: A LITTLE
CLIMB 3 TO 5 STEPS WITH RAILING: A LITTLE
DAILY ACTIVITIY SCORE: 24
CLIMB 3 TO 5 STEPS WITH RAILING: A LITTLE
MOVING TO AND FROM BED TO CHAIR: A LITTLE
MOBILITY SCORE: 19
MOBILITY SCORE: 23
WALKING IN HOSPITAL ROOM: A LITTLE
TURNING FROM BACK TO SIDE WHILE IN FLAT BAD: A LITTLE

## 2024-08-21 ASSESSMENT — PAIN SCALES - GENERAL
PAINLEVEL_OUTOF10: 6
PAINLEVEL_OUTOF10: 0 - NO PAIN
PAINLEVEL_OUTOF10: 3
PAINLEVEL_OUTOF10: 2
PAINLEVEL_OUTOF10: 5 - MODERATE PAIN
PAINLEVEL_OUTOF10: 0 - NO PAIN

## 2024-08-21 ASSESSMENT — PAIN - FUNCTIONAL ASSESSMENT
PAIN_FUNCTIONAL_ASSESSMENT: 0-10

## 2024-08-21 ASSESSMENT — ACTIVITIES OF DAILY LIVING (ADL): ADL_ASSISTANCE: INDEPENDENT

## 2024-08-21 ASSESSMENT — PAIN DESCRIPTION - DESCRIPTORS: DESCRIPTORS: ACHING

## 2024-08-21 NOTE — CARE PLAN
The patient's goals for the shift include      The clinical goals for the shift include decrease pain, improve pneumonia    Over the shift, the patient did not make progress toward the following goals. Barriers to progression include . Recommendations to address these barriers include   Problem: Pain - Adult  Goal: Verbalizes/displays adequate comfort level or baseline comfort level  Outcome: Progressing     Problem: Safety - Adult  Goal: Free from fall injury  Outcome: Progressing     Problem: Discharge Planning  Goal: Discharge to home or other facility with appropriate resources  Outcome: Progressing     Problem: Chronic Conditions and Co-morbidities  Goal: Patient's chronic conditions and co-morbidity symptoms are monitored and maintained or improved  Outcome: Progressing     Problem: Aspiration  Goal: I will be free from signs and symptoms of aspiration or complications if aspiration has occurred  Outcome: Progressing   .

## 2024-08-21 NOTE — PROGRESS NOTES
Teresa Max is a 73 y.o. female on day 4 of admission presenting with No Principal Problem: There is no principal problem currently on the Problem List. Please update the Problem List and refresh..    Subjective   Interval History:   Remains afebrile  States she did not sleep well last night  Reports a nonproductive cough, denies chest pain   Reports having nausea earlier today, no vomiting    Objective   Range of Vitals (last 24 hours)  Heart Rate:  [76-97]   Temp:  [35.8 °C (96.4 °F)-36.4 °C (97.5 °F)]   Resp:  [17-20]   BP: (118-150)/(68-80)   Weight:  [55.5 kg (122 lb 5.7 oz)]   SpO2:  [93 %-98 %]   Daily Weight  08/21/24 : 55.5 kg (122 lb 5.7 oz)    Body mass index is 23.12 kg/m².    Physical Exam  Constitutional:       Appearance: Normal appearance.   HENT:      Head: Normocephalic and atraumatic.      Nose: Nose normal.   Eyes:      General: No scleral icterus.     Extraocular Movements: Extraocular movements intact.      Conjunctiva/sclera: Conjunctivae normal.   Cardiovascular:      Rate and Rhythm: Normal rate and regular rhythm.      Heart sounds: Normal heart sounds.   Pulmonary:      Breath sounds: Decreased breath sounds present.   Abdominal:      General: Bowel sounds are normal.      Palpations: Abdomen is soft.      Tenderness: There is no abdominal tenderness.   Musculoskeletal:      Cervical back: Normal range of motion and neck supple.      Right lower leg: No edema.      Left lower leg: No edema.   Skin:     General: Skin is warm and dry.   Neurological:      Mental Status: She is alert.   Psychiatric:         Behavior: Behavior is cooperative.        Antibiotics  piperacillin-tazobactam - 4.5 gram/100 mL    Relevant Results  Labs  Results from last 72 hours   Lab Units 08/21/24  0448 08/20/24  0444 08/19/24  0455   WBC AUTO x10*3/uL 5.3 5.2 6.2   HEMOGLOBIN g/dL 11.5* 11.9* 11.6*   HEMATOCRIT % 35.1* 36.5 36.2   PLATELETS AUTO x10*3/uL 159 154 134*   NEUTROS PCT AUTO % 56.0  --   --   "  LYMPHS PCT AUTO % 27.3  --   --    MONOS PCT AUTO % 10.8  --   --    EOS PCT AUTO % 5.1  --   --      Results from last 72 hours   Lab Units 24  0448 24  0444 24  0455   SODIUM mmol/L 147* 143 143   POTASSIUM mmol/L 3.4 3.7 3.6   CHLORIDE mmol/L 109* 106 107   CO2 mmol/L 27 26 25   BUN mg/dL 11 10 15   CREATININE mg/dL 0.80 0.70 0.80   GLUCOSE mg/dL 106* 102* 105*   CALCIUM mg/dL 9.5 9.2 8.9   ANION GAP mmol/L 11 11 11   EGFR mL/min/1.73m*2 79 >90 79           Estimated Creatinine Clearance: 48.7 mL/min (by C-G formula based on SCr of 0.8 mg/dL).  No results found for: \"CRP\"  Microbiology  Blood cultures pending no growth   Imaging  FL modified barium swallow study    Result Date: 2024  Interpreted By:  Sandee Baldwin and Brodsky Sheryl STUDY: FL MODIFIED BARIUM SWALLOW STUDY;; 2024 1:55 pm   INDICATION: Signs/Symptoms:aspiration pneumonia.   COMPARISON: None.   ACCESSION NUMBER(S): [id number]   ORDERING CLINICIAN: MARIE DURANT   SPEECH FINDINGS: Modified Barium Swallow Study completed. Informed verbal consent obtained prior to completion of exam. Trials of thin, nectar/mildly thick liquid, puree, regular solids and barium tablet with thin liquid were given.   SLP: Lynn Fitch, SLP Contact info: [employer] secure chat; phone: [phone]     Reason for Referral: r/o aspiration Patient Hx: presents to the ED from home after a choking episode while eating pizza around 1300 today.. Her  is at bedside and states that she has been having increasing choking episodes more often,past medical history of hypertension, GERD, hyperlipidemia, and history of lung cancer with radiation treatment and has been in remission for 8 years. Respiratory Status: Room air Current diet:   Pain: Pain Scale: 0-10 Ratin FINAL SPEECH RECOMMENDATIONS   DIET: - Regular (IDDSI Level 7) - Thin liquids (IDDSI Level 0) Per the results of today's MBSS, patient to continue baseline diet of regular " consistencies and thin liquids following swallow strategies listed below:   STRATEGIES: - Small bites - Alternate consistencies - Upright for all PO intake - Reflux Precautions     Plan: Treatment/Interventions: Pharyngeal exercises, Patient/family education, Bolus trials, Compensatory strategy training, Diet tolerance/advancement SLP Plan: Skilled SLP warranted SLP Frequency: 1-2 follow up sessions Duration:   Discussed POC: Patient Discussed Risks/Benefits: Yes Patient/Caregiver Agreeable: Yes   Short term goals established 08/19/24: - Patient will tolerate baseline/recommended PO diet without overt s/s of aspiration, further difficulty, or concern for aspiration-related complications in 90% of observed therapeutic trials. - Patient will implement safe swallowing strategies to reduce risk of aspiration and other s/sx of dysphagia in 90% of trials given caregiver assistance/cueing as needed.     Long term goals 08/19/24: Patient will tolerate the least restrictive diet without overt difficulty or further pulmonary compromise by time of discharge.   Education Provided: Results and recommendations per MBSS, with video review; recommendations and POC at this time. Verbal understanding and agreement given on all accounts.   Additional consult suggested: GI consult d/t esophageal retention   Mechanics of the Swallow Summary: ORAL PHASE: WFL   PHARYNGEAL PHASE: WFL ESOPHAGEAL PHASE: Esophageal clearance - Esophageal retention   SLP Impressions with Severity Rating: Pt presents with no oropharyngeal swallow WFL upon completion of modified barium swallow study this date. Patient demonstrated no penetration and no aspiration was visualized during study.   OUTCOME MEASURES: Functional Oral Intake Scale Functional Oral Intake Scale: Level 7        total oral diet with no restrictions   Rosenbek's Penetration Aspiration Scale Thin Liquids: 1. NO ASPIRATION & NO PENETRATION - no aspiration, contrast does not enter airway Bynum  Thick Liquids: 1. NO ASPIRATION & NO PENETRATION - no aspiration, contrast does not enter airway Puree: 1. NO ASPIRATION & NO PENETRATION - no aspiration, contrast does not enter airway Solids: 1. NO ASPIRATION & NO PENETRATION - no aspiration, contrast does not enter airway Speech Therapy section of this report signed by Lynn Fitch MA-CCC/SLP on 8/19/2024 at 3:47 pm.   RADIOLOGY FINDINGS: Included portions visualized cervical spine demonstrate moderate multilevel anterior osteophyte formation C3/4 through C5/6.         1. Modified barium swallow as described above   MACRO: None   Signed by: Sandee Baldwin 8/19/2024 4:58 PM Dictation workstation:   [id number]    ECG 12 lead    Result Date: 8/19/2024  Sinus tachycardia Left axis deviation Left ventricular hypertrophy with repolarization abnormality ( R in aVL , Luis product ) Cannot rule out Septal infarct , age undetermined Abnormal ECG When compared with ECG of 05-MAR-2018 17:28, Premature ventricular complexes are no longer Present Minimal criteria for Septal infarct are now Present    CT angio chest for pulmonary embolism    Result Date: 8/17/2024  Interpreted By:  Freddy Umanzor, STUDY: CT ANGIO CHEST FOR PULMONARY EMBOLISM;  8/17/2024 5:42 pm   INDICATION: Signs/Symptoms:tachycardia, hypoxia; r/o PE.   COMPARISON: Chest CT 09/11/2023   ACCESSION NUMBER(S): [id number]   ORDERING CLINICIAN: SIOMARA FALCON   TECHNIQUE: Helical data acquisition of the chest was obtained after the intravenous administration of  of contrast. Images were reformatted in coronal and sagittal planes. Axial and coronal MIP images were created and reviewed.   FINDINGS: POTENTIAL LIMITATIONS OF THE STUDY: None   HEART AND VESSELS: No discrete filling defects within the adequately visualized portions of main pulmonary artery or its branches.   Main pulmonary artery and its branches are unremarkable in caliber.   The thoracic aorta is unchanged with respect to course, caliber, and  contour.   No coronary artery calcifications are seen.The study is not optimized for evaluation of coronary arteries.   The cardiac chambers are not enlarged.   No evidence of pericardial effusion.   MEDIASTINUM AND NARCISO, LOWER NECK AND AXILLA: Peripherally calcified 1.6 cm right thyroid nodule, unchanged in size.   No evidence of thoracic lymphadenopathy by CT criteria.Subcentimeter mediastinal lymph nodes are present, nonspecific.   Esophagus appears within normal limits as seen.   LUNGS AND AIRWAYS: The trachea and central airways are patent. No endobronchial lesion.   There is been significant interval increased perihilar confluent opacification with air bronchograms noted centrally and extensive nodular and patchy airspace opacities extending into the bilateral lower lobes as well as opacification of the right paramediastinal region anteriorly within the right upper lobe. New patchy and nodular airspace opacities of the right middle lobe.   No pneumothorax or effusion.   UPPER ABDOMEN: Cholelithiasis. Remaining subdiaphragmatic findings appear stable.   CHEST WALL AND OSSEOUS STRUCTURES: No acute osseous abnormality.Multilevel degenerative changes are noted throughout the imaged spine.       1.  No evidence of pulmonary embolus. 2. Extensive bilateral bilateral lung opacification in predominantly perihilar distribution with patchy and nodular airspace opacities extending to the bilateral lower lobes and right middle lobe. 3. Remaining intrathoracic findings appear stable since comparison imaging 09/11/2023.     Signed by: Freddy Umanzor 8/17/2024 6:52 PM Dictation workstation:   [id number]    XR chest 1 view    Result Date: 8/17/2024  Interpreted By:  Deepak Weeks, STUDY: XR CHEST 1 VIEW;  8/17/2024 4:30 pm   INDICATION: Signs/Symptoms:hypoxic.   COMPARISON: None.   ACCESSION NUMBER(S): [id number]   ORDERING CLINICIAN: ANTONY PERAZA   FINDINGS: Heart size borderline enlarged. Suspect some degree of mild  underlying vascular congestion. There is some patchy atelectasis or infiltrate in the lung bases.   No pneumothorax or significant effusion.       Bibasilar patchy infiltrate or atelectasis.   MACRO: None   Signed by: Deepak Weeks 8/17/2024 5:16 PM Dictation workstation:   [id number]     Assessment/Plan     Acute hypoxic respiratory failure, resolved, on room air  Aspiration pneumonia  Dysphagia  Abnormal CT-bilateral lung opacification  History of lung cancer, in remission     Continue Zosyn while inpatient  Oxygen as needed  Repeat CT chest as advised by pulmonary - plan as outpatient  Follow-up blood cultures-pending no growth  Supportive care  Monitor temperature and WBC  Long-term plan is to de-escalate to Augmentin to complete total of 7 days    Total time spent caring for the patient today was 20 minutes.  This includes time spent before the visit reviewing the chart, time spent during the visit, and time spent after the visit on documentation.    Mallory Alexis, APRN-CNP

## 2024-08-21 NOTE — PROGRESS NOTES
INTERNAL MEDICINE PROGRESS NOTE      HPI:    Patient reports some improvement in breathing.  She remains on room air.    Vital signs in last 24 hours:  Temp:  [35.8 °C (96.4 °F)-36.3 °C (97.3 °F)] 35.8 °C (96.4 °F)  Heart Rate:  [76-97] 80  Resp:  [18-20] 20  BP: (126-150)/(68-80) 144/76    Physical Examination:  Physical Exam    Constitutional:       Appearance: Elderly, well-built, in no distress  HENT:      Head: Normocephalic and atraumatic.   Eyes:      Extraocular Movements: Extraocular movements intact.      Pupils: Pupils are equal, round, and reactive to light.   Cardiovascular:      Rate and Rhythm: Normal rate and regular rhythm.      Pulses: Normal pulses.      Heart sounds: Normal heart sounds.   Pulmonary:      Effort: Pulmonary effort is normal.      Breath sounds: Few rhonchi, faint wheeze, diminished bases  Abdominal:      General: Abdomen is flat. Bowel sounds are normal.      Palpations: Abdomen is soft.   Musculoskeletal:         General: Normal range of motion.      Cervical back: Normal range of motion and neck supple.   Skin:     General: Skin is warm and dry.   Neurological:      General: No focal deficit present.      Mental Status: Alert and oriented x 3, speech fluent    Medications:    Current Facility-Administered Medications:     aspirin EC tablet 81 mg, 81 mg, oral, Daily, ANA Shine, 81 mg at 08/21/24 0938    benzonatate (Tessalon) capsule 100 mg, 100 mg, oral, TID PRN, Michael Bingham MD, 100 mg at 08/21/24 0938    enoxaparin (Lovenox) syringe 40 mg, 40 mg, subcutaneous, q24h, ANA Shine, 40 mg at 08/20/24 2026    ibuprofen tablet 400 mg, 400 mg, oral, q4h PRN, ANA Shine, 400 mg at 08/21/24 0037    ipratropium-albuteroL (Duo-Neb) 0.5-2.5 mg/3 mL nebulizer solution 3 mL, 3 mL, nebulization, q4h PRN, HANY GraceCNP, 3 mL at 08/21/24 0149    ondansetron (Zofran) tablet 4 mg, 4 mg, oral, q8h PRN, 4 mg at  08/21/24 0942 **OR** ondansetron (Zofran) injection 4 mg, 4 mg, intravenous, q8h PRN, ANA Shine    pantoprazole (ProtoNix) EC tablet 40 mg, 40 mg, oral, Daily before breakfast, SILVIA Shine-CNP, 40 mg at 08/21/24 0605    piperacillin-tazobactam (Zosyn) 4.5 g in dextrose (iso)  mL, 4.5 g, intravenous, q8h MONIE, Kane Marin MD, Stopped at 08/21/24 0632    polyethylene glycol (Glycolax, Miralax) packet 17 g, 17 g, oral, Daily PRN, ANA Shine, 17 g at 08/19/24 0854    pravastatin (Pravachol) tablet 80 mg, 80 mg, oral, Nightly, ANA Shine, 80 mg at 08/20/24 2026    sodium chloride 0.9% infusion, 100 mL/hr, intravenous, Continuous, ANA Shine    traMADol (Ultram) tablet 50 mg, 50 mg, oral, q6h PRN, SILVIA Shine-CNP, 50 mg at 08/21/24 0938    Laboratory Findings:  Lab Results   Component Value Date    WBC 5.3 08/21/2024    HGB 11.5 (L) 08/21/2024    HCT 35.1 (L) 08/21/2024    MCV 83 08/21/2024     08/21/2024     Lab Results   Component Value Date    INR 1.1 03/05/2018    PROTIME 11.3 03/05/2018     Lab Results   Component Value Date    GLUCOSE 106 (H) 08/21/2024    CALCIUM 9.5 08/21/2024     (H) 08/21/2024    K 3.4 08/21/2024    CO2 27 08/21/2024     (H) 08/21/2024    BUN 11 08/21/2024    CREATININE 0.80 08/21/2024       Assessment and Plan:    Aspiration pneumonia -continue with antibiotics as ordered, improving  Dysphagia -speech therapy assessment noted.  Tolerating diet.  Bronchospasm-continue with aerosol treatments, pulmonary input appreciated  Weakness -PT and OT consulted.    Will plan for discharge home tomorrow      Michael Bingham MD  08/21/24  12:52 PM

## 2024-08-21 NOTE — CARE PLAN
The patient's goals for the shift include      The clinical goals for the shift include monitor pain      Problem: Pain - Adult  Goal: Verbalizes/displays adequate comfort level or baseline comfort level  Outcome: Progressing     Problem: Safety - Adult  Goal: Free from fall injury  Outcome: Progressing     Problem: Discharge Planning  Goal: Discharge to home or other facility with appropriate resources  Outcome: Progressing     Problem: Chronic Conditions and Co-morbidities  Goal: Patient's chronic conditions and co-morbidity symptoms are monitored and maintained or improved  Outcome: Progressing     Problem: Aspiration  Goal: I will be free from signs and symptoms of aspiration or complications if aspiration has occurred  Outcome: Progressing

## 2024-08-21 NOTE — PROGRESS NOTES
Pulmonary Progress Note 08/21/24     FOLLOWUP FOR: Pneumonia    SUBJECTIVE  Did not sleep well last night.  Endorses some coughing.  Feels unsteady on her feet at times and globally weak.  Breathing is okay.      PHYSICAL EXAM        8/20/2024     7:11 AM 8/20/2024    12:04 PM 8/20/2024     3:00 PM 8/20/2024     7:59 PM 8/20/2024    11:38 PM 8/21/2024     4:59 AM 8/21/2024     7:56 AM   Vitals   Systolic 128 118 126 133 149 150 144   Diastolic 79 71 68 71 71 80 76   Heart Rate 78 88 80 97  76 80   Temp 35.8 °C (96.4 °F) 36.4 °C (97.5 °F) 36.2 °C (97.2 °F) 36.2 °C (97.2 °F) 36.3 °C (97.3 °F) 36.2 °C (97.2 °F) 35.8 °C (96.4 °F)   Resp 18 17 18 18 18 20    Weight (lb)      122.36    BMI      23.12 kg/m2    BSA (m2)      1.55 m2        Intake/Output last 3 shifts:  I/O last 3 completed shifts:  In: 1265 (22.8 mL/kg) [P.O.:665; IV Piggyback:600]  Out: 125 (2.3 mL/kg) [Urine:125 (0.1 mL/kg/hr)]  Weight: 55.5 kg   Intake/Output this shift:  No intake/output data recorded.      Physical Exam  Vitals reviewed.   Constitutional:       Appearance: Normal appearance.   Cardiovascular:      Rate and Rhythm: Normal rate.   Pulmonary:      Effort: Pulmonary effort is normal.      Breath sounds: Normal breath sounds. No wheezing or rhonchi.   Musculoskeletal:      Right lower leg: No edema.      Left lower leg: No edema.   Skin:     General: Skin is warm.   Neurological:      Mental Status: She is alert. Mental status is at baseline.   Psychiatric:         Mood and Affect: Mood normal.         Behavior: Behavior normal.           Scheduled medications  aspirin, 81 mg, oral, Daily  enoxaparin, 40 mg, subcutaneous, q24h  pantoprazole, 40 mg, oral, Daily before breakfast  piperacillin-tazobactam, 4.5 g, intravenous, q8h MONIE  pravastatin, 80 mg, oral, Nightly      Continuous medications  sodium chloride 0.9%, 100 mL/hr      PRN medications  PRN medications: benzonatate, ibuprofen, ipratropium-albuteroL, ondansetron **OR**  ondansetron, polyethylene glycol, traMADol     Labs:  Lab Results   Component Value Date     (H) 2024    K 3.4 2024     (H) 2024    CO2 27 2024    BUN 11 2024    CREATININE 0.80 2024    GLUCOSE 106 (H) 2024    CALCIUM 9.5 2024     Lab Results   Component Value Date    WBC 5.3 2024    HGB 11.5 (L) 2024    HCT 35.1 (L) 2024    MCV 83 2024     2024       Imaging:  FL modified barium swallow study    Result Date: 2024  Interpreted By:  Sandee Baldwin and Brodsky Sheryl STUDY: FL MODIFIED BARIUM SWALLOW STUDY;; 2024 1:55 pm   INDICATION: Signs/Symptoms:aspiration pneumonia.   COMPARISON: None.   ACCESSION NUMBER(S): [id number]   ORDERING CLINICIAN: MARIE DURANT   SPEECH FINDINGS: Modified Barium Swallow Study completed. Informed verbal consent obtained prior to completion of exam. Trials of thin, nectar/mildly thick liquid, puree, regular solids and barium tablet with thin liquid were given.   SLP: Lynn Fitch, SLP Contact info: [facility]; phone: [phone]     Reason for Referral: r/o aspiration Patient Hx: presents to the ED from home after a choking episode while eating pizza around 1300 today.. Her  is at bedside and states that she has been having increasing choking episodes more often,past medical history of hypertension, GERD, hyperlipidemia, and history of lung cancer with radiation treatment and has been in remission for 8 years. Respiratory Status: Room air Current diet:   Pain: Pain Scale: 0-10 Ratin FINAL SPEECH RECOMMENDATIONS   DIET: - Regular (IDDSI Level 7) - Thin liquids (IDDSI Level 0) Per the results of today's MBSS, patient to continue baseline diet of regular consistencies and thin liquids following swallow strategies listed below:   STRATEGIES: - Small bites - Alternate consistencies - Upright for all PO intake - Reflux Precautions     Plan:  Treatment/Interventions: Pharyngeal exercises, Patient/family education, Bolus trials, Compensatory strategy training, Diet tolerance/advancement SLP Plan: Skilled SLP warranted SLP Frequency: 1-2 follow up sessions Duration:   Discussed POC: Patient Discussed Risks/Benefits: Yes Patient/Caregiver Agreeable: Yes   Short term goals established 08/19/24: - Patient will tolerate baseline/recommended PO diet without overt s/s of aspiration, further difficulty, or concern for aspiration-related complications in 90% of observed therapeutic trials. - Patient will implement safe swallowing strategies to reduce risk of aspiration and other s/sx of dysphagia in 90% of trials given caregiver assistance/cueing as needed.     Long term goals 08/19/24: Patient will tolerate the least restrictive diet without overt difficulty or further pulmonary compromise by time of discharge.   Education Provided: Results and recommendations per MBSS, with video review; recommendations and POC at this time. Verbal understanding and agreement given on all accounts.   Additional consult suggested: GI consult d/t esophageal retention   Mechanics of the Swallow Summary: ORAL PHASE: WFL   PHARYNGEAL PHASE: WFL ESOPHAGEAL PHASE: Esophageal clearance - Esophageal retention   SLP Impressions with Severity Rating: Pt presents with no oropharyngeal swallow WFL upon completion of modified barium swallow study this date. Patient demonstrated no penetration and no aspiration was visualized during study.   OUTCOME MEASURES: Functional Oral Intake Scale Functional Oral Intake Scale: Level 7        total oral diet with no restrictions   Rosenbek's Penetration Aspiration Scale Thin Liquids: 1. NO ASPIRATION & NO PENETRATION - no aspiration, contrast does not enter airway [place] Thick Liquids: 1. NO ASPIRATION & NO PENETRATION - no aspiration, contrast does not enter airway Puree: 1. NO ASPIRATION & NO PENETRATION - no aspiration, contrast does not enter airway  Solids: 1. NO ASPIRATION & NO PENETRATION - no aspiration, contrast does not enter airway Speech Therapy section of this report signed by Lynn Fitch MA-CCC/SLP on 8/19/2024 at 3:47 pm.   RADIOLOGY FINDINGS: Included portions visualized cervical spine demonstrate moderate multilevel anterior osteophyte formation C3/4 through C5/6.         1. Modified barium swallow as described above   MACRO: None   Signed by: Sandee Baldwin 8/19/2024 4:58 PM Dictation workstation:   [id number]    ECG 12 lead    Result Date: 8/19/2024  Sinus tachycardia Left axis deviation Left ventricular hypertrophy with repolarization abnormality ( R in aVL , [place] product ) Cannot rule out Septal infarct , age undetermined Abnormal ECG When compared with ECG of 05-MAR-2018 17:28, Premature ventricular complexes are no longer Present Minimal criteria for Septal infarct are now Present    CT angio chest for pulmonary embolism    Result Date: 8/17/2024  Interpreted By:  Freddy Umanzor, STUDY: CT ANGIO CHEST FOR PULMONARY EMBOLISM;  8/17/2024 5:42 pm   INDICATION: Signs/Symptoms:tachycardia, hypoxia; r/o PE.   COMPARISON: Chest CT 09/11/2023   ACCESSION NUMBER(S): [id number]   ORDERING CLINICIAN: SIOMARA FALCON   TECHNIQUE: Helical data acquisition of the chest was obtained after the intravenous administration of  of contrast. Images were reformatted in coronal and sagittal planes. Axial and coronal MIP images were created and reviewed.   FINDINGS: POTENTIAL LIMITATIONS OF THE STUDY: None   HEART AND VESSELS: No discrete filling defects within the adequately visualized portions of main pulmonary artery or its branches.   Main pulmonary artery and its branches are unremarkable in caliber.   The thoracic aorta is unchanged with respect to course, caliber, and contour.   No coronary artery calcifications are seen.The study is not optimized for evaluation of coronary arteries.   The cardiac chambers are not enlarged.   No evidence of pericardial  effusion.   MEDIASTINUM AND NARCISO, LOWER NECK AND AXILLA: Peripherally calcified 1.6 cm right thyroid nodule, unchanged in size.   No evidence of thoracic lymphadenopathy by CT criteria.Subcentimeter mediastinal lymph nodes are present, nonspecific.   Esophagus appears within normal limits as seen.   LUNGS AND AIRWAYS: The trachea and central airways are patent. No endobronchial lesion.   There is been significant interval increased perihilar confluent opacification with air bronchograms noted centrally and extensive nodular and patchy airspace opacities extending into the bilateral lower lobes as well as opacification of the right paramediastinal region anteriorly within the right upper lobe. New patchy and nodular airspace opacities of the right middle lobe.   No pneumothorax or effusion.   UPPER ABDOMEN: Cholelithiasis. Remaining subdiaphragmatic findings appear stable.   CHEST WALL AND OSSEOUS STRUCTURES: No acute osseous abnormality.Multilevel degenerative changes are noted throughout the imaged spine.       1.  No evidence of pulmonary embolus. 2. Extensive bilateral bilateral lung opacification in predominantly perihilar distribution with patchy and nodular airspace opacities extending to the bilateral lower lobes and right middle lobe. 3. Remaining intrathoracic findings appear stable since comparison imaging 09/11/2023.     Signed by: Freddy Umanzor 8/17/2024 6:52 PM Dictation workstation:   [id number]    XR chest 1 view    Result Date: 8/17/2024  Interpreted By:  Deepak Weeks, STUDY: XR CHEST 1 VIEW;  8/17/2024 4:30 pm   INDICATION: Signs/Symptoms:hypoxic.   COMPARISON: None.   ACCESSION NUMBER(S): [id number]   ORDERING CLINICIAN: ANTONY PERAZA   FINDINGS: Heart size borderline enlarged. Suspect some degree of mild underlying vascular congestion. There is some patchy atelectasis or infiltrate in the lung bases.   No pneumothorax or significant effusion.       Bibasilar patchy infiltrate or atelectasis.    MACRO: None   Signed by: Deepak Weeks 8/17/2024 5:16 PM Dictation workstation:   [id number]            Assessment/Plan   Active Problems:  There are no active Hospital Problems.      Acute respiratory failure with hypoxia: Largely resolved and likely in the setting of aspiration pneumonitis  Has not had any need for escalation  Aspiration pneumonia  Antibiotics per ID  History of lung cancer: In remission  Repeat imaging in 6 to 8 weeks to document clearance  Pulmonary disposition  No absolute contraindication for discharge planning.  She does feel overall weak and and potentially in the setting of her pneumonia.  Will have therapy see her.     LOS: 4 days       Carlie Josue MD  Pulmonary/Critical Care medicine

## 2024-08-21 NOTE — PROGRESS NOTES
Physical Therapy    Physical Therapy Evaluation & Treatment    Patient Name: Teresa Max  MRN: [id number]  Today's Date: 8/21/2024   Time Calculation  Start Time: 1245  Stop Time: 1305  Time Calculation (min): 20 min    Assessment/Plan   PT Assessment  PT Assessment Results: Decreased strength, Decreased range of motion, Decreased endurance, Impaired balance, Decreased mobility  Rehab Prognosis: Good  Evaluation/Treatment Tolerance: Patient tolerated treatment well  Medical Staff Made Aware: Yes  End of Session Communication: Bedside nurse  Assessment Comment: patient presents with impaired functional mobility, including decreased BLE strength, impaired balance, decreased tolerance to activity.  End of Session Patient Position: Bed, 2 rail up, Alarm off, not on at start of session   IP OR SWING BED PT PLAN  Inpatient or Swing Bed: Inpatient  PT Plan  PT Plan: Ongoing PT  PT Frequency: 4 times per week  PT Discharge Recommendations: Low intensity level of continued care  PT Recommended Transfer Status: Assist x1  PT - OK to Discharge: Yes      Subjective     General Visit Information:  General  Reason for Referral: impaired mobility; 74 y/o female admit from home with complaints of choking while eating pizza, causing her to get dizzy and n/v.  Referred By: Alex Josue MD  Past Medical History Relevant to Rehab: lung cancer with mets to brain, low back pain, dysphagia, [place], pna  Prior to Session Communication: Bedside nurse  Patient Position Received: Bed, 2 rail up, Alarm off, not on at start of session  General Comment: pt cleared for therapy by nursing, supine upon arrival and agreeable to therapy.  Home Living:  Home Living  Type of Home: House  Lives With: Spouse  Home Adaptive Equipment: None  Home Layout: Two level, Stairs to alternate level with rails  Alternate Level Stairs-Rails:  (single)  Alternate Level Stairs-Number of Steps: 12  Home Access: Stairs to enter without rails  Entrance Stairs-Rails:  None  Entrance Stairs-Number of Steps: 3  Bathroom Shower/Tub: Walk-in shower  Bathroom Toilet: Standard  Bathroom Equipment: Grab bars in shower, Built-in shower seat  Home Living Comments: spouse works during the day  Prior Level of Function:  Prior Function Per Pt/Caregiver Report  Level of [place]: Independent with ADLs and functional transfers, Independent with homemaking with ambulation  Receives Help From: Family  ADL Assistance: Independent  Homemaking Assistance: Independent  Ambulatory Assistance: Independent  Prior Function Comments: denies falls  Precautions:  Precautions  Hearing/Visual Limitations: reading glasses  Medical Precautions: Fall precautions  Vital Signs:  Vital Signs  Heart Rate: 90  Heart Rate Source: Monitor    Objective   Pain:  Pain Assessment  Pain Assessment: 0-10  0-10 (Numeric) Pain Score: 0 - No pain  Cognition:  Cognition  Overall Cognitive Status: Within Functional Limits    General Assessments:  General Observation  General Observation: alert, NAD     Activity Tolerance  Endurance: Decreased tolerance for upright activites    Sensation  Light Touch: No apparent deficits  Sensation Comment: denies paresthesias    Strength  Strength Comments: BLEs grossly >3+/5  Strength  Strength Comments: BLEs grossly >3+/5    Coordination  Coordination Comment: decreased rate of movements    Postural Control  Posture Comment: forward head posture and rounded shoulders    Functional Assessments:  Bed Mobility  Bed Mobility: Yes (supervision supine <> sit for safety, HOB elevated. no LOB. denies s/s.)    Transfers  Transfer:  (supervision sit <> stand progressing to CGA for safety; reports dizziness. no LOB.)    Ambulation/Gait Training  Ambulation/Gait Training Performed:  (pt amb 6'x2 without AD, min A to steady; demonstrates slow gail, decreased B step length, mildly unsteady, guarded. mild dizziness. returned to supine and s/s resolved.)  Extremity/Trunk Assessments:  RLE   RLE :  Within Functional Limits  LLE   LLE : Within Functional Limits  Treatments:       Bed Mobility  Bed Mobility: Yes (supervision supine <> sit for safety, HOB elevated. no LOB. denies s/s.)    Ambulation/Gait Training  Ambulation/Gait Training Performed:  (pt amb 6'x2 without AD, min A to steady; demonstrates slow gail, decreased B step length, mildly unsteady, guarded. mild dizziness. returned to supine and s/s resolved.)  Transfers  Transfer:  (supervision sit <> stand progressing to [facility] for safety; reports dizziness. no LOB.)  Outcome Measures:  [facility] Basic Mobility  Turning from your back to your side while in a flat bed without using bedrails: None  Moving from lying on your back to sitting on the side of a flat bed without using bedrails: A little  Moving to and from bed to chair (including a wheelchair): A little  Standing up from a chair using your arms (e.g. wheelchair or bedside chair): A little  To walk in hospital room: A little  Climbing 3-5 steps with railing: A little  Basic Mobility - Total Score: 19    Encounter Problems       Encounter Problems (Active)       PT Problem       Patient will ascend/descend 12 stairs with single UE support on 1 rail(s) modified independently and use of no assistive device  (Progressing)       Start:  08/21/24    Expected End:  09/11/24            Patient will transfer supine <> sit independently  (Progressing)       Start:  08/21/24    Expected End:  09/11/24            Patient will transfer sit <> stand independently  (Progressing)       Start:  08/21/24    Expected End:  09/11/24            Patient will ambulate 150 ft independently (Progressing)       Start:  08/21/24    Expected End:  09/11/24               Pain - Adult              Education Documentation  Precautions, taught by Agata Vargas, PT at 8/21/2024  1:31 PM.  Learner: Patient  Readiness: Acceptance  Method: Explanation  Response: Verbalizes Understanding    Body Mechanics, taught by Agata Vargas,  PT at 8/21/2024  1:31 PM.  Learner: Patient  Readiness: Acceptance  Method: Explanation  Response: Verbalizes Understanding    Mobility Training, taught by Agata Vargas, PT at 8/21/2024  1:31 PM.  Learner: Patient  Readiness: Acceptance  Method: Explanation  Response: Verbalizes Understanding    Education Comments  No comments found.

## 2024-08-21 NOTE — CARE PLAN
Problem: Pain - Adult  Goal: Verbalizes/displays adequate comfort level or baseline comfort level  8/21/2024 1133 by Keya Doran RN  Outcome: Progressing  8/21/2024 1131 by Keya Doran RN  Outcome: Progressing     Problem: Safety - Adult  Goal: Free from fall injury  8/21/2024 1133 by Keya Doran RN  Outcome: Progressing  8/21/2024 1131 by Keya Doran RN  Outcome: Progressing     Problem: Discharge Planning  Goal: Discharge to home or other facility with appropriate resources  8/21/2024 1133 by Keya Doran RN  Outcome: Progressing  8/21/2024 1131 by Keya Doran RN  Outcome: Progressing     Problem: Chronic Conditions and Co-morbidities  Goal: Patient's chronic conditions and co-morbidity symptoms are monitored and maintained or improved  8/21/2024 1133 by Keya Doran RN  Outcome: Progressing  8/21/2024 1131 by Keya Doran RN  Outcome: Progressing     Problem: Aspiration  Goal: I will be free from signs and symptoms of aspiration or complications if aspiration has occurred  8/21/2024 1133 by Keya Doran RN  Outcome: Progressing  8/21/2024 1131 by Keya Doran RN  Outcome: Progressing   The patient's goals for the shift include      The clinical goals for the shift include monitor pain

## 2024-08-22 LAB
ANION GAP SERPL CALC-SCNC: 9 MMOL/L
ATRIAL RATE: 118 BPM
BACTERIA BLD CULT: NORMAL
BACTERIA BLD CULT: NORMAL
BASOPHILS # BLD AUTO: 0.03 X10*3/UL (ref 0–0.1)
BASOPHILS NFR BLD AUTO: 0.6 %
BUN SERPL-MCNC: 13 MG/DL (ref 8–25)
CALCIUM SERPL-MCNC: 9.2 MG/DL (ref 8.5–10.4)
CHLORIDE SERPL-SCNC: 105 MMOL/L (ref 97–107)
CO2 SERPL-SCNC: 28 MMOL/L (ref 24–31)
CREAT SERPL-MCNC: 0.8 MG/DL (ref 0.4–1.6)
EGFRCR SERPLBLD CKD-EPI 2021: 79 ML/MIN/1.73M*2
EOSINOPHIL # BLD AUTO: 0.32 X10*3/UL (ref 0–0.4)
EOSINOPHIL NFR BLD AUTO: 6.8 %
ERYTHROCYTE [DISTWIDTH] IN BLOOD BY AUTOMATED COUNT: 13 % (ref 11.5–14.5)
GLUCOSE SERPL-MCNC: 102 MG/DL (ref 65–99)
HCT VFR BLD AUTO: 36.8 % (ref 36–46)
HGB BLD-MCNC: 11.6 G/DL (ref 12–16)
IMM GRANULOCYTES # BLD AUTO: 0.01 X10*3/UL (ref 0–0.5)
IMM GRANULOCYTES NFR BLD AUTO: 0.2 % (ref 0–0.9)
LYMPHOCYTES # BLD AUTO: 1.57 X10*3/UL (ref 0.8–3)
LYMPHOCYTES NFR BLD AUTO: 33.3 %
MCH RBC QN AUTO: 26.9 PG (ref 26–34)
MCHC RBC AUTO-ENTMCNC: 31.5 G/DL (ref 32–36)
MCV RBC AUTO: 85 FL (ref 80–100)
MONOCYTES # BLD AUTO: 0.46 X10*3/UL (ref 0.05–0.8)
MONOCYTES NFR BLD AUTO: 9.7 %
NEUTROPHILS # BLD AUTO: 2.33 X10*3/UL (ref 1.6–5.5)
NEUTROPHILS NFR BLD AUTO: 49.4 %
NRBC BLD-RTO: 0 /100 WBCS (ref 0–0)
P AXIS: 49 DEGREES
P OFFSET: 183 MS
P ONSET: 132 MS
PLATELET # BLD AUTO: 183 X10*3/UL (ref 150–450)
POTASSIUM SERPL-SCNC: 3.9 MMOL/L (ref 3.4–5.1)
PR INTERVAL: 158 MS
Q ONSET: 211 MS
QRS COUNT: 20 BEATS
QRS DURATION: 92 MS
QT INTERVAL: 320 MS
QTC CALCULATION(BAZETT): 448 MS
QTC FREDERICIA: 401 MS
R AXIS: -31 DEGREES
RBC # BLD AUTO: 4.32 X10*6/UL (ref 4–5.2)
SODIUM SERPL-SCNC: 142 MMOL/L (ref 133–145)
T AXIS: 82 DEGREES
T OFFSET: 371 MS
VENTRICULAR RATE: 118 BPM
WBC # BLD AUTO: 4.7 X10*3/UL (ref 4.4–11.3)

## 2024-08-22 PROCEDURE — 2500000002 HC RX 250 W HCPCS SELF ADMINISTERED DRUGS (ALT 637 FOR MEDICARE OP, ALT 636 FOR OP/ED): Performed by: INTERNAL MEDICINE

## 2024-08-22 PROCEDURE — 2500000001 HC RX 250 WO HCPCS SELF ADMINISTERED DRUGS (ALT 637 FOR MEDICARE OP): Performed by: NURSE PRACTITIONER

## 2024-08-22 PROCEDURE — 94640 AIRWAY INHALATION TREATMENT: CPT

## 2024-08-22 PROCEDURE — 2060000001 HC INTERMEDIATE ICU ROOM DAILY

## 2024-08-22 PROCEDURE — 97116 GAIT TRAINING THERAPY: CPT | Mod: GP,CQ

## 2024-08-22 PROCEDURE — 2500000004 HC RX 250 GENERAL PHARMACY W/ HCPCS (ALT 636 FOR OP/ED): Performed by: NURSE PRACTITIONER

## 2024-08-22 PROCEDURE — 85025 COMPLETE CBC W/AUTO DIFF WBC: CPT | Performed by: INTERNAL MEDICINE

## 2024-08-22 PROCEDURE — 9420000001 HC RT PATIENT EDUCATION 5 MIN

## 2024-08-22 PROCEDURE — 2500000004 HC RX 250 GENERAL PHARMACY W/ HCPCS (ALT 636 FOR OP/ED): Performed by: FAMILY MEDICINE

## 2024-08-22 PROCEDURE — 94664 DEMO&/EVAL PT USE INHALER: CPT

## 2024-08-22 PROCEDURE — 82374 ASSAY BLOOD CARBON DIOXIDE: CPT | Performed by: INTERNAL MEDICINE

## 2024-08-22 PROCEDURE — 97110 THERAPEUTIC EXERCISES: CPT | Mod: GP,CQ

## 2024-08-22 PROCEDURE — 36415 COLL VENOUS BLD VENIPUNCTURE: CPT | Performed by: INTERNAL MEDICINE

## 2024-08-22 PROCEDURE — 2500000001 HC RX 250 WO HCPCS SELF ADMINISTERED DRUGS (ALT 637 FOR MEDICARE OP): Performed by: INTERNAL MEDICINE

## 2024-08-22 ASSESSMENT — COGNITIVE AND FUNCTIONAL STATUS - GENERAL
CLIMB 3 TO 5 STEPS WITH RAILING: A LITTLE
DAILY ACTIVITIY SCORE: 24
MOBILITY SCORE: 23
MOBILITY SCORE: 23
DAILY ACTIVITIY SCORE: 24
CLIMB 3 TO 5 STEPS WITH RAILING: A LITTLE

## 2024-08-22 ASSESSMENT — PAIN SCALES - GENERAL
PAINLEVEL_OUTOF10: 0 - NO PAIN
PAINLEVEL_OUTOF10: 0 - NO PAIN
PAINLEVEL_OUTOF10: 3
PAINLEVEL_OUTOF10: 1
PAINLEVEL_OUTOF10: 0 - NO PAIN

## 2024-08-22 ASSESSMENT — PAIN - FUNCTIONAL ASSESSMENT: PAIN_FUNCTIONAL_ASSESSMENT: 0-10

## 2024-08-22 ASSESSMENT — ENCOUNTER SYMPTOMS: COUGH: 1

## 2024-08-22 NOTE — PROGRESS NOTES
Spiritual Care Visit    Clinical Encounter Type  Visited With: Patient not available  Routine Visit: Follow-up  Continue Visiting: Yes     Nicho Ely

## 2024-08-22 NOTE — PROGRESS NOTES
08/22/24 1022   Discharge Planning   Expected Discharge Disposition Home  (Refusing HHC)     Patient refusing HHC at this time, will turn home with no needs.     **Patient has a  safe discharge plan**

## 2024-08-22 NOTE — PROGRESS NOTES
Physical Therapy    Physical Therapy Treatment    Patient Name: Teresa Max  MRN: [id number]  Today's Date: 8/22/2024  Time Calculation  Start Time: 1010  Stop Time: 1033  Time Calculation (min): 23 min    Assessment/Plan   PT Assessment  End of Session Communication: Bedside nurse  Assessment Comment: Pt continues to demo progress towards stated goals. Pt reports no SOB throughout session. Good safety awareness.  End of Session Patient Position: Bed, 2 rail up, Alarm off, not on at start of session  PT Plan  Inpatient/Swing Bed or Outpatient: Inpatient  PT Plan  PT Plan: Ongoing PT  PT Frequency: 4 times per week  PT Discharge Recommendations: Low intensity level of continued care  PT Recommended Transfer Status: Assist x1  PT - OK to Discharge: Yes      General Visit Information:   PT  Visit  PT Received On: 08/22/24  General  Prior to Session Communication: Bedside nurse  Patient Position Received: Bed, 2 rail up, Alarm off, not on at start of session  General Comment: Pt cleared for PT by RN. Pt agreeable to PT services.    Subjective   Precautions:  Precautions  Precautions Comment: + tele    Objective   Pain:  Pain Assessment  Pain Assessment: 0-10  0-10 (Numeric) Pain Score: 0 - No pain  Cognition:  Cognition  Overall Cognitive Status: Within Functional Limits  Coordination:     Postural Control:  Static Sitting Balance  Static Sitting-Balance Support: No upper extremity supported, Feet unsupported  Static Sitting-Level of Assistance: Distant supervision  Static Sitting-Comment/Number of Minutes: Good seated static balance  Static Standing Balance  Static Standing-Balance Support: No upper extremity supported  Static Standing-Level of Assistance: Close supervision  Static Standing-Comment/Number of Minutes: Good static standing balance    Treatments:  Therapeutic Exercise  Therapeutic Exercise Performed: Yes  Therapeutic Exercise Activity 1: Seated CHAPIN[place]O HUYNH x20  Therapeutic Exercise Activity 2: Seated B chanel  x20  Therapeutic Exercise Activity 3: Seated B hip abd/add x20 with light resist  Therapeutic Exercise Activity 4: STS x10    Therapeutic Activity  Therapeutic Activity Performed: No    Bed Mobility  Bed Mobility: Yes  Bed Mobility 1  Bed Mobility 1: Supine to sitting, Sitting to supine  Level of Assistance 1: Distant supervision  Bed Mobility Comments 1: Sup for all bed mobility. HOB slightly elevated, no use of bed rails.    Ambulation/Gait Training  Ambulation/Gait Training Performed: Yes  Ambulation/Gait Training 1  Surface 1: Level tile  Device 1: No device  Assistance 1: Close supervision  Quality of Gait 1: Narrow base of support, Inconsistent stride length, Decreased step length, Soft knee(s)  Comments/Distance (ft) 1: 150 feet x2. Pt had one instance of mild LOB, able to self correct. Pt reports its from not being OOB frequently, no other instances throughout session. Able to navigate directional turns and obstacles.  Transfers  Transfer: Yes  Transfer 1  Transfer From 1: Sit to, Stand to  Transfer to 1: Sit, Stand  Technique 1: Sit to stand  Transfer Level of Assistance 1: Distant supervision  Trials/Comments 1: Sup for safety    Outcome Measures:  [facility] Basic Mobility  Turning from your back to your side while in a flat bed without using bedrails: None  Moving from lying on your back to sitting on the side of a flat bed without using bedrails: None  Moving to and from bed to chair (including a wheelchair): None  Standing up from a chair using your arms (e.g. wheelchair or bedside chair): None  To walk in hospital room: None  Climbing 3-5 steps with railing: A little  Basic Mobility - Total Score: 23    Encounter Problems       Encounter Problems (Active)       PT Problem       Patient will ascend/descend 12 stairs with single UE support on 1 rail(s) modified independently and use of no assistive device  (Not Progressing)       Start:  08/21/24    Expected End:  09/11/24            Patient will transfer  supine <> sit independently  (Progressing)       Start:  08/21/24    Expected End:  09/11/24            Patient will transfer sit <> stand independently  (Progressing)       Start:  08/21/24    Expected End:  09/11/24            Patient will ambulate 150 ft independently (Progressing)       Start:  08/21/24    Expected End:  09/11/24               Pain - Adult

## 2024-08-22 NOTE — PROGRESS NOTES
INTERNAL MEDICINE PROGRESS NOTE      HPI:    Patient reports some shortness of breath with exertion.  She is still wheezing.    Vital signs in last 24 hours:  Temp:  [35.7 °C (96.3 °F)-36.5 °C (97.7 °F)] 35.7 °C (96.3 °F)  Heart Rate:  [81-90] 81  Resp:  [15-18] 17  BP: (115-153)/(55-80) 128/55    Physical Examination:  Physical Exam    Constitutional:       Appearance: Elderly, well-built, in no distress  HENT:      Head: Normocephalic and atraumatic.   Eyes:      Extraocular Movements: Extraocular movements intact.      Pupils: Pupils are equal, round, and reactive to light.   Cardiovascular:      Rate and Rhythm: Normal rate and regular rhythm.      Pulses: Normal pulses.      Heart sounds: Normal heart sounds.   Pulmonary:      Effort: Pulmonary effort is normal.      Breath sounds: Few rhonchi, faint wheeze, diminished bases  Abdominal:      General: Abdomen is flat. Bowel sounds are normal.      Palpations: Abdomen is soft.   Musculoskeletal:         General: Normal range of motion.      Cervical back: Normal range of motion and neck supple.   Skin:     General: Skin is warm and dry.   Neurological:      General: No focal deficit present.      Mental Status: Alert and oriented x 3, speech fluent    Medications:    Current Facility-Administered Medications:     aspirin EC tablet 81 mg, 81 mg, oral, Daily, ANA Shine, 81 mg at 08/22/24 0940    benzonatate (Tessalon) capsule 100 mg, 100 mg, oral, TID PRN, Michael Bingham MD, 100 mg at 08/22/24 0635    enoxaparin (Lovenox) syringe 40 mg, 40 mg, subcutaneous, q24h, ANA Shine, 40 mg at 08/21/24 2014    ibuprofen tablet 400 mg, 400 mg, oral, q4h PRN, ANA Shine, 400 mg at 08/22/24 0635    ipratropium-albuteroL (Duo-Neb) 0.5-2.5 mg/3 mL nebulizer solution 3 mL, 3 mL, nebulization, TID, Michael Bingham MD, 3 mL at 08/22/24 0733    ondansetron (Zofran) tablet 4 mg, 4 mg, oral, q8h PRN, 4 mg at 08/21/24  0942 **OR** ondansetron (Zofran) injection 4 mg, 4 mg, intravenous, q8h PRN, ANA Shine    pantoprazole (ProtoNix) EC tablet 40 mg, 40 mg, oral, Daily before breakfast, ANA Shine, 40 mg at 08/22/24 0616    piperacillin-tazobactam (Zosyn) 4.5 g in dextrose (iso)  mL, 4.5 g, intravenous, q8h MONIE, Kane Marin MD, Last Rate: 0 mL/hr at 08/21/24 2134, 4.5 g at 08/22/24 0616    polyethylene glycol (Glycolax, Miralax) packet 17 g, 17 g, oral, Daily PRN, ANA Shine, 17 g at 08/19/24 0854    pravastatin (Pravachol) tablet 80 mg, 80 mg, oral, Nightly, ANA Shine, 80 mg at 08/21/24 2014    sodium chloride 0.9% infusion, 100 mL/hr, intravenous, Continuous, ANA Shine    traMADol (Ultram) tablet 50 mg, 50 mg, oral, q6h PRN, ANA Shine, 50 mg at 08/21/24 0938    Laboratory Findings:  Lab Results   Component Value Date    WBC 4.7 08/22/2024    HGB 11.6 (L) 08/22/2024    HCT 36.8 08/22/2024    MCV 85 08/22/2024     08/22/2024     Lab Results   Component Value Date    INR 1.1 03/05/2018    PROTIME 11.3 03/05/2018     Lab Results   Component Value Date    GLUCOSE 102 (H) 08/22/2024    CALCIUM 9.2 08/22/2024     08/22/2024    K 3.9 08/22/2024    CO2 28 08/22/2024     08/22/2024    BUN 13 08/22/2024    CREATININE 0.80 08/22/2024       Assessment and Plan:    Aspiration pneumonia -continue with antibiotics, improving slowly.  Dysphagia -MBS results noted, diet as tolerated.  Bronchospasm-continue with aerosol treatments, improving slowly.  Weakness -increased activity encouraged.    Will plan for discharge home tomorrow      Michael Bingham MD  08/22/24  10:08 AM

## 2024-08-22 NOTE — CARE PLAN
Problem: Pain - Adult  Goal: Verbalizes/displays adequate comfort level or baseline comfort level  Outcome: Progressing     Problem: Safety - Adult  Goal: Free from fall injury  Outcome: Progressing     Problem: Discharge Planning  Goal: Discharge to home or other facility with appropriate resources  Outcome: Progressing     Problem: Chronic Conditions and Co-morbidities  Goal: Patient's chronic conditions and co-morbidity symptoms are monitored and maintained or improved  Outcome: Progressing     Problem: Aspiration  Goal: I will be free from signs and symptoms of aspiration or complications if aspiration has occurred  Outcome: Progressing   The patient's goals for the shift include      The clinical goals for the shift include monitor pain    Over the shift, the patient did not make progress toward the following goals. Barriers to progression include . Recommendations to address these barriers include .

## 2024-08-22 NOTE — PROGRESS NOTES
Pulmonary Progress Note 08/22/24     FOLLOWUP FOR: Pneumonia    SUBJECTIVE  Breathing okay.  Endorses a nonproductive cough.  Breathing treatments do help.  Still feels fairly weak.  She is not sure if she is ready to go home today.      PHYSICAL EXAM        8/21/2024    11:00 AM 8/21/2024    12:45 PM 8/21/2024     3:00 PM 8/21/2024     8:16 PM 8/22/2024    12:14 AM 8/22/2024     5:32 AM 8/22/2024     6:43 AM   Vitals   Systolic 153  115 119 126 117    Diastolic 80  63 61 73 68    Heart Rate 81 90 81       Temp 36.5 °C (97.7 °F)  36.4 °C (97.5 °F) 36.3 °C (97.3 °F) 36.1 °C (97 °F) 36.3 °C (97.3 °F)    Resp    15 18 17    Weight (lb)       122.36   BMI       23.12 kg/m2   BSA (m2)       1.55 m2       Intake/Output last 3 shifts:  I/O last 3 completed shifts:  In: 440 (7.9 mL/kg) [P.O.:240; IV Piggyback:200]  Out: - (0 mL/kg)   Weight: 55.5 kg   Intake/Output this shift:  No intake/output data recorded.      Physical Exam  Vitals reviewed.   Constitutional:       Appearance: Normal appearance.   Cardiovascular:      Rate and Rhythm: Normal rate.   Pulmonary:      Effort: Pulmonary effort is normal.      Breath sounds: Wheezing present. No rhonchi.   Musculoskeletal:      Right lower leg: No edema.      Left lower leg: No edema.   Skin:     General: Skin is warm.   Neurological:      Mental Status: She is alert. Mental status is at baseline.   Psychiatric:         Mood and Affect: Mood normal.         Behavior: Behavior normal.           Scheduled medications  aspirin, 81 mg, oral, Daily  enoxaparin, 40 mg, subcutaneous, q24h  ipratropium-albuteroL, 3 mL, nebulization, TID  pantoprazole, 40 mg, oral, Daily before breakfast  piperacillin-tazobactam, 4.5 g, intravenous, q8h MONIE  pravastatin, 80 mg, oral, Nightly      Continuous medications  sodium chloride 0.9%, 100 mL/hr      PRN medications  PRN medications: benzonatate, ibuprofen, ondansetron **OR** ondansetron, polyethylene glycol, traMADol      Labs:  Lab Results   Component Value Date     2024    K 3.9 2024     2024    CO2 28 2024    BUN 13 2024    CREATININE 0.80 2024    GLUCOSE 102 (H) 2024    CALCIUM 9.2 2024     Lab Results   Component Value Date    WBC 4.7 2024    HGB 11.6 (L) 2024    HCT 36.8 2024    MCV 85 2024     2024       Imaging:  FL modified barium swallow study    Result Date: 2024  Interpreted By:  Sandee Baldwin and Brodsky Sheryl STUDY: FL MODIFIED BARIUM SWALLOW STUDY;; 2024 1:55 pm   INDICATION: Signs/Symptoms:aspiration pneumonia.   COMPARISON: None.   ACCESSION NUMBER(S): [id number]   ORDERING CLINICIAN: MARIE DURANT   SPEECH FINDINGS: Modified Barium Swallow Study completed. Informed verbal consent obtained prior to completion of exam. Trials of thin, nectar/mildly thick liquid, puree, regular solids and barium tablet with thin liquid were given.   SLP: Lynn Fitch, SLP Contact info: [employer] secure chat; phone: [phone]     Reason for Referral: r/o aspiration Patient Hx: presents to the ED from home after a choking episode while eating pizza around 1300 today.. Her  is at bedside and states that she has been having increasing choking episodes more often,past medical history of hypertension, GERD, hyperlipidemia, and history of lung cancer with radiation treatment and has been in remission for 8 years. Respiratory Status: Room air Current diet:   Pain: Pain Scale: 0-10 Ratin FINAL SPEECH RECOMMENDATIONS   DIET: - Regular (IDDSI Level 7) - Thin liquids (IDDSI Level 0) Per the results of today's MBSS, patient to continue baseline diet of regular consistencies and thin liquids following swallow strategies listed below:   STRATEGIES: - Small bites - Alternate consistencies - Upright for all PO intake - Reflux Precautions     Plan: Treatment/Interventions: Pharyngeal exercises, Patient/family  education, Bolus trials, Compensatory strategy training, Diet tolerance/advancement SLP Plan: Skilled SLP warranted SLP Frequency: 1-2 follow up sessions Duration:   Discussed POC: Patient Discussed Risks/Benefits: Yes Patient/Caregiver Agreeable: Yes   Short term goals established 08/19/24: - Patient will tolerate baseline/recommended PO diet without overt s/s of aspiration, further difficulty, or concern for aspiration-related complications in 90% of observed therapeutic trials. - Patient will implement safe swallowing strategies to reduce risk of aspiration and other s/sx of dysphagia in 90% of trials given caregiver assistance/cueing as needed.     Long term goals 08/19/24: Patient will tolerate the least restrictive diet without overt difficulty or further pulmonary compromise by time of discharge.   Education Provided: Results and recommendations per MBSS, with video review; recommendations and POC at this time. Verbal understanding and agreement given on all accounts.   Additional consult suggested: GI consult d/t esophageal retention   Mechanics of the Swallow Summary: ORAL PHASE: WFL   PHARYNGEAL PHASE: WFL ESOPHAGEAL PHASE: Esophageal clearance - Esophageal retention   SLP Impressions with Severity Rating: Pt presents with no oropharyngeal swallow WFL upon completion of modified barium swallow study this date. Patient demonstrated no penetration and no aspiration was visualized during study.   OUTCOME MEASURES: Functional Oral Intake Scale Functional Oral Intake Scale: Level 7        total oral diet with no restrictions   Rosenbek's Penetration Aspiration Scale Thin Liquids: 1. NO ASPIRATION & NO PENETRATION - no aspiration, contrast does not enter airway Falman Thick Liquids: 1. NO ASPIRATION & NO PENETRATION - no aspiration, contrast does not enter airway Puree: 1. NO ASPIRATION & NO PENETRATION - no aspiration, contrast does not enter airway Solids: 1. NO ASPIRATION & NO PENETRATION - no aspiration,  contrast does not enter airway Speech Therapy section of this report signed by Lynn RO/SLP on 8/19/2024 at 3:47 pm.   RADIOLOGY FINDINGS: Included portions visualized cervical spine demonstrate moderate multilevel anterior osteophyte formation C3/4 through C5/6.         1. Modified barium swallow as described above   MACRO: None   Signed by: Sandee Baldwin 8/19/2024 4:58 PM Dictation workstation:   [id number]    ECG 12 lead    Result Date: 8/19/2024  Sinus tachycardia Left axis deviation Left ventricular hypertrophy with repolarization abnormality ( R in aVL , Luis product ) Cannot rule out Septal infarct , age undetermined Abnormal ECG When compared with ECG of 05-MAR-2018 17:28, Premature ventricular complexes are no longer Present Minimal criteria for Septal infarct are now Present    CT angio chest for pulmonary embolism    Result Date: 8/17/2024  Interpreted By:  Freddy Umanzor, STUDY: CT ANGIO CHEST FOR PULMONARY EMBOLISM;  8/17/2024 5:42 pm   INDICATION: Signs/Symptoms:tachycardia, hypoxia; r/o PE.   COMPARISON: Chest CT 09/11/2023   ACCESSION NUMBER(S): [id number]   ORDERING CLINICIAN: SIOMARA FALCON   TECHNIQUE: Helical data acquisition of the chest was obtained after the intravenous administration of  of contrast. Images were reformatted in coronal and sagittal planes. Axial and coronal MIP images were created and reviewed.   FINDINGS: POTENTIAL LIMITATIONS OF THE STUDY: None   HEART AND VESSELS: No discrete filling defects within the adequately visualized portions of main pulmonary artery or its branches.   Main pulmonary artery and its branches are unremarkable in caliber.   The thoracic aorta is unchanged with respect to course, caliber, and contour.   No coronary artery calcifications are seen.The study is not optimized for evaluation of coronary arteries.   The cardiac chambers are not enlarged.   No evidence of pericardial effusion.   MEDIASTINUM AND NARCISO, LOWER NECK AND AXILLA:  Peripherally calcified 1.6 cm right thyroid nodule, unchanged in size.   No evidence of thoracic lymphadenopathy by CT criteria.Subcentimeter mediastinal lymph nodes are present, nonspecific.   Esophagus appears within normal limits as seen.   LUNGS AND AIRWAYS: The trachea and central airways are patent. No endobronchial lesion.   There is been significant interval increased perihilar confluent opacification with air bronchograms noted centrally and extensive nodular and patchy airspace opacities extending into the bilateral lower lobes as well as opacification of the right paramediastinal region anteriorly within the right upper lobe. New patchy and nodular airspace opacities of the right middle lobe.   No pneumothorax or effusion.   UPPER ABDOMEN: Cholelithiasis. Remaining subdiaphragmatic findings appear stable.   CHEST WALL AND OSSEOUS STRUCTURES: No acute osseous abnormality.Multilevel degenerative changes are noted throughout the imaged spine.       1.  No evidence of pulmonary embolus. 2. Extensive bilateral bilateral lung opacification in predominantly perihilar distribution with patchy and nodular airspace opacities extending to the bilateral lower lobes and right middle lobe. 3. Remaining intrathoracic findings appear stable since comparison imaging 09/11/2023.     Signed by: Freddy Umaznor 8/17/2024 6:52 PM Dictation workstation:   [id number]    XR chest 1 view    Result Date: 8/17/2024  Interpreted By:  Deepak Weeks, STUDY: XR CHEST 1 VIEW;  8/17/2024 4:30 pm   INDICATION: Signs/Symptoms:hypoxic.   COMPARISON: None.   ACCESSION NUMBER(S): [id number]   ORDERING CLINICIAN: ANTONY PERAZA   FINDINGS: Heart size borderline enlarged. Suspect some degree of mild underlying vascular congestion. There is some patchy atelectasis or infiltrate in the lung bases.   No pneumothorax or significant effusion.       Bibasilar patchy infiltrate or atelectasis.   MACRO: None   Signed by: Deepak Weeks 8/17/2024 5:16 PM  Dictation workstation:   [id number]            Assessment/Plan   Active Problems:  There are no active Hospital Problems.      Acute respiratory failure with hypoxia: Largely resolved and likely in the setting of aspiration pneumonitis  Remains on room air  Acute bronchospasm: Some wheezing on exam which is likely in the setting of airway disease owing to pneumonia  Continue with bronchodilators  Aspiration pneumonia  Antibiotics per ID  History of lung cancer: In remission  Repeat imaging in 6 to 8 weeks to document clearance  Pulmonary disposition  Still okay for discharge planning.  Patient unsure about today.  Will continue to follow     LOS: 5 days       Carlie Josue MD  Pulmonary/Critical Care medicine

## 2024-08-22 NOTE — PROGRESS NOTES
Teresa Max is a 73 y.o. female on day 5 of admission presenting with No Principal Problem: There is no principal problem currently on the Problem List. Please update the Problem List and refresh..    Subjective   Interval History:   Afebrile, no chills  Occasional cough, nonproductive.  No chest pain no nausea vomiting or diarrhea        Review of Systems   Respiratory:  Positive for cough.    All other systems reviewed and are negative.      Objective   Range of Vitals (last 24 hours)  Heart Rate:  [81-90]   Temp:  [35.7 °C (96.3 °F)-36.5 °C (97.7 °F)]   Resp:  [15-18]   BP: (115-153)/(55-80)   Weight:  [55 kg (121 lb 4.1 oz)-55.5 kg (122 lb 5.7 oz)]   SpO2:  [91 %-93 %]   Daily Weight  08/22/24 : 55 kg (121 lb 4.1 oz)    Body mass index is 22.91 kg/m².    Physical Exam  Constitutional:       Appearance: Normal appearance.   HENT:      Head: Normocephalic and atraumatic.      Nose: Nose normal.   Eyes:      General: No scleral icterus.     Extraocular Movements: Extraocular movements intact.      Conjunctiva/sclera: Conjunctivae normal.   Cardiovascular:      Rate and Rhythm: Normal rate and regular rhythm.      Heart sounds: Normal heart sounds.   Pulmonary:      Breath sounds: Decreased breath sounds present.   Abdominal:      General: Bowel sounds are normal.      Palpations: Abdomen is soft.      Tenderness: There is no abdominal tenderness.   Musculoskeletal:      Cervical back: Normal range of motion and neck supple.      Right lower leg: No edema.      Left lower leg: No edema.   Skin:     General: Skin is warm and dry.   Neurological:      Mental Status: She is alert.   Psychiatric:         Behavior: Behavior is cooperative.     Antibiotics  piperacillin-tazobactam - 4.5 gram/100 mL    Relevant Results  Labs  Results from last 72 hours   Lab Units 08/22/24  0454 08/21/24  0448 08/20/24  0444   WBC AUTO x10*3/uL 4.7 5.3 5.2   HEMOGLOBIN g/dL 11.6* 11.5* 11.9*   HEMATOCRIT % 36.8 35.1* 36.5   PLATELETS  "AUTO x10*3/uL 183 159 154   NEUTROS PCT AUTO % 49.4 56.0  --    LYMPHS PCT AUTO % 33.3 27.3  --    MONOS PCT AUTO % 9.7 10.8  --    EOS PCT AUTO % 6.8 5.1  --      Results from last 72 hours   Lab Units 08/22/24  0454 08/21/24  0448 08/20/24  0444   SODIUM mmol/L 142 147* 143   POTASSIUM mmol/L 3.9 3.4 3.7   CHLORIDE mmol/L 105 109* 106   CO2 mmol/L 28 27 26   BUN mg/dL 13 11 10   CREATININE mg/dL 0.80 0.80 0.70   GLUCOSE mg/dL 102* 106* 102*   CALCIUM mg/dL 9.2 9.5 9.2   ANION GAP mmol/L 9 11 11   EGFR mL/min/1.73m*2 79 79 >90         Estimated Creatinine Clearance: 48.7 mL/min (by C-G formula based on SCr of 0.8 mg/dL).  No results found for: \"CRP\"  Microbiology  Reviewed  Imaging  ECG 12 lead    Result Date: 8/22/2024  Sinus tachycardia Left axis deviation Left ventricular hypertrophy with repolarization abnormality ( R in aVL , [place] product ) Cannot rule out Septal infarct , age undetermined Abnormal ECG When compared with ECG of 05-MAR-2018 17:28, Premature ventricular complexes are no longer Present Minimal criteria for Septal infarct are now Present Confirmed by Mir Patricia ([id number]) on 8/22/2024 8:44:50 AM    FL modified barium swallow study    Result Date: 8/19/2024  Interpreted By:  Sandee Baldwin and Brodsky Sheryl STUDY: [place] MODIFIED BARIUM SWALLOW STUDY;; 8/19/2024 1:55 pm   INDICATION: Signs/Symptoms:aspiration pneumonia.   COMPARISON: None.   ACCESSION NUMBER(S): [id number]   ORDERING CLINICIAN: MARIE DURANT   SPEECH FINDINGS: Modified Barium Swallow Study completed. Informed verbal consent obtained prior to completion of exam. Trials of thin, nectar/mildly thick liquid, puree, regular solids and barium tablet with thin liquid were given.   SLP: VON Pedro Contact info: [employer]; phone: [phone]     Reason for Referral: r/o aspiration Patient Hx: presents to the ED from home after a choking episode while eating pizza around 1300 today.. Her  is at bedside and " states that she has been having increasing choking episodes more often,past medical history of hypertension, GERD, hyperlipidemia, and history of lung cancer with radiation treatment and has been in remission for 8 years. Respiratory Status: Room air Current diet:   Pain: Pain Scale: 0-10 Ratin FINAL SPEECH RECOMMENDATIONS   DIET: - Regular (IDDSI Level 7) - Thin liquids (IDDSI Level 0) Per the results of today's MBSS, patient to continue baseline diet of regular consistencies and thin liquids following swallow strategies listed below:   STRATEGIES: - Small bites - Alternate consistencies - Upright for all PO intake - Reflux Precautions     Plan: Treatment/Interventions: Pharyngeal exercises, Patient/family education, Bolus trials, Compensatory strategy training, Diet tolerance/advancement SLP Plan: Skilled SLP warranted SLP Frequency: 1-2 follow up sessions Duration:   Discussed POC: Patient Discussed Risks/Benefits: Yes Patient/Caregiver Agreeable: Yes   Short term goals established 24: - Patient will tolerate baseline/recommended PO diet without overt s/s of aspiration, further difficulty, or concern for aspiration-related complications in 90% of observed therapeutic trials. - Patient will implement safe swallowing strategies to reduce risk of aspiration and other s/sx of dysphagia in 90% of trials given caregiver assistance/cueing as needed.     Long term goals 24: Patient will tolerate the least restrictive diet without overt difficulty or further pulmonary compromise by time of discharge.   Education Provided: Results and recommendations per MBSS, with video review; recommendations and POC at this time. Verbal understanding and agreement given on all accounts.   Additional consult suggested: GI consult d/t esophageal retention   Mechanics of the Swallow Summary: ORAL PHASE: WFL   PHARYNGEAL PHASE: WFL ESOPHAGEAL PHASE: Esophageal clearance - Esophageal retention   SLP Impressions with Severity  Rating: Pt presents with no oropharyngeal swallow WFL upon completion of modified barium swallow study this date. Patient demonstrated no penetration and no aspiration was visualized during study.   OUTCOME MEASURES: Functional Oral Intake Scale Functional Oral Intake Scale: Level 7        total oral diet with no restrictions   Rosenbek's Penetration Aspiration Scale Thin Liquids: 1. NO ASPIRATION & NO PENETRATION - no aspiration, contrast does not enter airway [place] Thick Liquids: 1. NO ASPIRATION & NO PENETRATION - no aspiration, contrast does not enter airway Puree: 1. NO ASPIRATION & NO PENETRATION - no aspiration, contrast does not enter airway Solids: 1. NO ASPIRATION & NO PENETRATION - no aspiration, contrast does not enter airway Speech Therapy section of this report signed by Lynn Fitch MA-CCC/SLP on 8/19/2024 at 3:47 pm.   RADIOLOGY FINDINGS: Included portions visualized cervical spine demonstrate moderate multilevel anterior osteophyte formation C3/4 through C5/6.         1. Modified barium swallow as described above   MACRO: None   Signed by: Sandee Baldwin 8/19/2024 4:58 PM Dictation workstation:   [id number]    CT angio chest for pulmonary embolism    Result Date: 8/17/2024  Interpreted By:  Freddy Umanzor, STUDY: CT ANGIO CHEST FOR PULMONARY EMBOLISM;  8/17/2024 5:42 pm   INDICATION: Signs/Symptoms:tachycardia, hypoxia; r/o PE.   COMPARISON: Chest CT 09/11/2023   ACCESSION NUMBER(S): [id number]   ORDERING CLINICIAN: SIOMARA FALCON   TECHNIQUE: Helical data acquisition of the chest was obtained after the intravenous administration of  of contrast. Images were reformatted in coronal and sagittal planes. Axial and coronal MIP images were created and reviewed.   FINDINGS: POTENTIAL LIMITATIONS OF THE STUDY: None   HEART AND VESSELS: No discrete filling defects within the adequately visualized portions of main pulmonary artery or its branches.   Main pulmonary artery and its branches are  unremarkable in caliber.   The thoracic aorta is unchanged with respect to course, caliber, and contour.   No coronary artery calcifications are seen.The study is not optimized for evaluation of coronary arteries.   The cardiac chambers are not enlarged.   No evidence of pericardial effusion.   MEDIASTINUM AND NARCISO, LOWER NECK AND AXILLA: Peripherally calcified 1.6 cm right thyroid nodule, unchanged in size.   No evidence of thoracic lymphadenopathy by CT criteria.Subcentimeter mediastinal lymph nodes are present, nonspecific.   Esophagus appears within normal limits as seen.   LUNGS AND AIRWAYS: The trachea and central airways are patent. No endobronchial lesion.   There is been significant interval increased perihilar confluent opacification with air bronchograms noted centrally and extensive nodular and patchy airspace opacities extending into the bilateral lower lobes as well as opacification of the right paramediastinal region anteriorly within the right upper lobe. New patchy and nodular airspace opacities of the right middle lobe.   No pneumothorax or effusion.   UPPER ABDOMEN: Cholelithiasis. Remaining subdiaphragmatic findings appear stable.   CHEST WALL AND OSSEOUS STRUCTURES: No acute osseous abnormality.Multilevel degenerative changes are noted throughout the imaged spine.       1.  No evidence of pulmonary embolus. 2. Extensive bilateral bilateral lung opacification in predominantly perihilar distribution with patchy and nodular airspace opacities extending to the bilateral lower lobes and right middle lobe. 3. Remaining intrathoracic findings appear stable since comparison imaging 09/11/2023.     Signed by: Freddy Umanzor 8/17/2024 6:52 PM Dictation workstation:   [id number]    XR chest 1 view    Result Date: 8/17/2024  Interpreted By:  Deepak Weeks, STUDY: XR CHEST 1 VIEW;  8/17/2024 4:30 pm   INDICATION: Signs/Symptoms:hypoxic.   COMPARISON: None.   ACCESSION NUMBER(S): [id number]   ORDERING  CLINICIAN: ANTONY PERAZA   FINDINGS: Heart size borderline enlarged. Suspect some degree of mild underlying vascular congestion. There is some patchy atelectasis or infiltrate in the lung bases.   No pneumothorax or significant effusion.       Bibasilar patchy infiltrate or atelectasis.   MACRO: None   Signed by: Deepak Weeks 8/17/2024 5:16 PM Dictation workstation:   [id number]     Assessment/Plan   Acute hypoxic respiratory failure, resolved, on room air  Aspiration pneumonia  Dysphagia  Abnormal CT-bilateral lung opacification  History of lung cancer, in remission     Continue Zosyn while inpatient  Oxygen as needed  Repeat CT chest as advised by pulmonary - plan as outpatient  Follow-up blood cultures-pending no growth  Supportive care  Monitor temperature and WBC  Long-term plan is to de-escalate to Augmentin to complete total of 7 days      Julio César Conley MD

## 2024-08-22 NOTE — NURSING NOTE
Pt resting throughout night. Night time PRN dose of tesslon pearls and ibuprofen administered and early morning. Breathing tx changed from PRN to scheduled.

## 2024-08-23 ENCOUNTER — PHARMACY VISIT (OUTPATIENT)
Dept: PHARMACY | Facility: CLINIC | Age: 74
End: 2024-08-23
Payer: COMMERCIAL

## 2024-08-23 VITALS
SYSTOLIC BLOOD PRESSURE: 150 MMHG | HEART RATE: 91 BPM | WEIGHT: 123.24 LBS | BODY MASS INDEX: 23.27 KG/M2 | HEIGHT: 61 IN | DIASTOLIC BLOOD PRESSURE: 77 MMHG | OXYGEN SATURATION: 93 % | TEMPERATURE: 97.2 F | RESPIRATION RATE: 18 BRPM

## 2024-08-23 LAB
ANION GAP SERPL CALC-SCNC: 12 MMOL/L
BASOPHILS # BLD AUTO: 0.04 X10*3/UL (ref 0–0.1)
BASOPHILS NFR BLD AUTO: 0.8 %
BUN SERPL-MCNC: 10 MG/DL (ref 8–25)
CALCIUM SERPL-MCNC: 9.5 MG/DL (ref 8.5–10.4)
CHLORIDE SERPL-SCNC: 108 MMOL/L (ref 97–107)
CO2 SERPL-SCNC: 27 MMOL/L (ref 24–31)
CREAT SERPL-MCNC: 0.7 MG/DL (ref 0.4–1.6)
EGFRCR SERPLBLD CKD-EPI 2021: >90 ML/MIN/1.73M*2
EOSINOPHIL # BLD AUTO: 0.35 X10*3/UL (ref 0–0.4)
EOSINOPHIL NFR BLD AUTO: 7.3 %
ERYTHROCYTE [DISTWIDTH] IN BLOOD BY AUTOMATED COUNT: 13.2 % (ref 11.5–14.5)
GLUCOSE SERPL-MCNC: 97 MG/DL (ref 65–99)
HCT VFR BLD AUTO: 37.4 % (ref 36–46)
HGB BLD-MCNC: 12.2 G/DL (ref 12–16)
IMM GRANULOCYTES # BLD AUTO: 0.01 X10*3/UL (ref 0–0.5)
IMM GRANULOCYTES NFR BLD AUTO: 0.2 % (ref 0–0.9)
LYMPHOCYTES # BLD AUTO: 1.47 X10*3/UL (ref 0.8–3)
LYMPHOCYTES NFR BLD AUTO: 30.8 %
MCH RBC QN AUTO: 27.4 PG (ref 26–34)
MCHC RBC AUTO-ENTMCNC: 32.6 G/DL (ref 32–36)
MCV RBC AUTO: 84 FL (ref 80–100)
MONOCYTES # BLD AUTO: 0.55 X10*3/UL (ref 0.05–0.8)
MONOCYTES NFR BLD AUTO: 11.5 %
NEUTROPHILS # BLD AUTO: 2.35 X10*3/UL (ref 1.6–5.5)
NEUTROPHILS NFR BLD AUTO: 49.4 %
NRBC BLD-RTO: 0 /100 WBCS (ref 0–0)
PLATELET # BLD AUTO: 182 X10*3/UL (ref 150–450)
POTASSIUM SERPL-SCNC: 3.5 MMOL/L (ref 3.4–5.1)
RBC # BLD AUTO: 4.45 X10*6/UL (ref 4–5.2)
SODIUM SERPL-SCNC: 147 MMOL/L (ref 133–145)
WBC # BLD AUTO: 4.8 X10*3/UL (ref 4.4–11.3)

## 2024-08-23 PROCEDURE — 2500000001 HC RX 250 WO HCPCS SELF ADMINISTERED DRUGS (ALT 637 FOR MEDICARE OP): Performed by: INTERNAL MEDICINE

## 2024-08-23 PROCEDURE — 36415 COLL VENOUS BLD VENIPUNCTURE: CPT | Performed by: INTERNAL MEDICINE

## 2024-08-23 PROCEDURE — 85025 COMPLETE CBC W/AUTO DIFF WBC: CPT | Performed by: INTERNAL MEDICINE

## 2024-08-23 PROCEDURE — 2500000001 HC RX 250 WO HCPCS SELF ADMINISTERED DRUGS (ALT 637 FOR MEDICARE OP): Performed by: NURSE PRACTITIONER

## 2024-08-23 PROCEDURE — 2500000002 HC RX 250 W HCPCS SELF ADMINISTERED DRUGS (ALT 637 FOR MEDICARE OP, ALT 636 FOR OP/ED): Performed by: INTERNAL MEDICINE

## 2024-08-23 PROCEDURE — 94640 AIRWAY INHALATION TREATMENT: CPT

## 2024-08-23 PROCEDURE — 9420000001 HC RT PATIENT EDUCATION 5 MIN

## 2024-08-23 PROCEDURE — 2500000004 HC RX 250 GENERAL PHARMACY W/ HCPCS (ALT 636 FOR OP/ED): Performed by: FAMILY MEDICINE

## 2024-08-23 PROCEDURE — RXMED WILLOW AMBULATORY MEDICATION CHARGE

## 2024-08-23 PROCEDURE — 80048 BASIC METABOLIC PNL TOTAL CA: CPT | Performed by: INTERNAL MEDICINE

## 2024-08-23 RX ORDER — AMOXICILLIN AND CLAVULANATE POTASSIUM 875; 125 MG/1; MG/1
1 TABLET, FILM COATED ORAL EVERY 12 HOURS SCHEDULED
Qty: 14 TABLET | Refills: 0 | Status: SHIPPED | OUTPATIENT
Start: 2024-08-23 | End: 2024-08-30

## 2024-08-23 RX ORDER — ALBUTEROL SULFATE 90 UG/1
2 INHALANT RESPIRATORY (INHALATION) EVERY 6 HOURS PRN
Qty: 18 G | Refills: 11 | Status: SHIPPED | OUTPATIENT
Start: 2024-08-23

## 2024-08-23 RX ORDER — AMOXICILLIN AND CLAVULANATE POTASSIUM 875; 125 MG/1; MG/1
1 TABLET, FILM COATED ORAL EVERY 12 HOURS SCHEDULED
Status: DISCONTINUED | OUTPATIENT
Start: 2024-08-23 | End: 2024-08-23 | Stop reason: HOSPADM

## 2024-08-23 RX ORDER — ALBUTEROL SULFATE 0.83 MG/ML
3 SOLUTION RESPIRATORY (INHALATION) EVERY 6 HOURS PRN
Status: DISCONTINUED | OUTPATIENT
Start: 2024-08-23 | End: 2024-08-23 | Stop reason: HOSPADM

## 2024-08-23 RX ORDER — BENZONATATE 100 MG/1
100 CAPSULE ORAL 3 TIMES DAILY PRN
Qty: 20 CAPSULE | Refills: 0 | Status: SHIPPED | OUTPATIENT
Start: 2024-08-23

## 2024-08-23 ASSESSMENT — PAIN SCALES - GENERAL: PAINLEVEL_OUTOF10: 0 - NO PAIN

## 2024-08-23 ASSESSMENT — COGNITIVE AND FUNCTIONAL STATUS - GENERAL
MOBILITY SCORE: 23
CLIMB 3 TO 5 STEPS WITH RAILING: A LITTLE

## 2024-08-23 ASSESSMENT — ENCOUNTER SYMPTOMS: COUGH: 1

## 2024-08-23 NOTE — PROGRESS NOTES
Teresa Max is a 73 y.o. female on day 6 of admission presenting with No Principal Problem: There is no principal problem currently on the Problem List. Please update the Problem List and refresh..    Subjective   Interval History:   Afebrile, no chills  Reports an occasional cough  States she is doing better  Reports some nausea in the morning, resolved now  No vomiting        Review of Systems   Respiratory:  Positive for cough.    All other systems reviewed and are negative.      Objective   Range of Vitals (last 24 hours)  Heart Rate:  []   Temp:  [36.2 °C (97.2 °F)-37 °C (98.6 °F)]   Resp:  [17-19]   BP: (129-163)/(58-77)   Weight:  [55.9 kg (123 lb 3.8 oz)]   SpO2:  [91 %-95 %]   Daily Weight  08/23/24 : 55.9 kg (123 lb 3.8 oz)    Body mass index is 23.29 kg/m².    Physical Exam  Constitutional:       Appearance: Normal appearance.   HENT:      Head: Normocephalic and atraumatic.      Nose: Nose normal.   Eyes:      General: No scleral icterus.     Extraocular Movements: Extraocular movements intact.      Conjunctiva/sclera: Conjunctivae normal.   Cardiovascular:      Rate and Rhythm: Normal rate and regular rhythm.      Heart sounds: Normal heart sounds.   Pulmonary:      Breath sounds: Decreased breath sounds present.   Abdominal:      General: Bowel sounds are normal.      Palpations: Abdomen is soft.      Tenderness: There is no abdominal tenderness.   Musculoskeletal:      Cervical back: Normal range of motion and neck supple.      Right lower leg: No edema.      Left lower leg: No edema.   Skin:     General: Skin is warm and dry.   Neurological:      Mental Status: She is alert.   Psychiatric:         Behavior: Behavior is cooperative.     Antibiotics  amoxicillin-pot clavulanate - 875-125 mg, 875-125 mg    Relevant Results  Labs  Results from last 72 hours   Lab Units 08/23/24  0515 08/22/24  0454 08/21/24  0448   WBC AUTO x10*3/uL 4.8 4.7 5.3   HEMOGLOBIN g/dL 12.2 11.6* 11.5*   HEMATOCRIT %  "37.4 36.8 35.1*   PLATELETS AUTO x10*3/uL 182 183 159   NEUTROS PCT AUTO % 49.4 49.4 56.0   LYMPHS PCT AUTO % 30.8 33.3 27.3   MONOS PCT AUTO % 11.5 9.7 10.8   EOS PCT AUTO % 7.3 6.8 5.1     Results from last 72 hours   Lab Units 08/23/24  0515 08/22/24  0454 08/21/24  0448   SODIUM mmol/L 147* 142 147*   POTASSIUM mmol/L 3.5 3.9 3.4   CHLORIDE mmol/L 108* 105 109*   CO2 mmol/L 27 28 27   BUN mg/dL 10 13 11   CREATININE mg/dL 0.70 0.80 0.80   GLUCOSE mg/dL 97 102* 106*   CALCIUM mg/dL 9.5 9.2 9.5   ANION GAP mmol/L 12 9 11   EGFR mL/min/1.73m*2 >90 79 79         Estimated Creatinine Clearance: 55.6 mL/min (by C-G formula based on SCr of 0.7 mg/dL).  No results found for: \"CRP\"  Microbiology  Reviewed  Imaging  ECG 12 lead    Result Date: 8/22/2024  Sinus tachycardia Left axis deviation Left ventricular hypertrophy with repolarization abnormality ( R in aVL , [place] product ) Cannot rule out Septal infarct , age undetermined Abnormal ECG When compared with ECG of 05-MAR-2018 17:28, Premature ventricular complexes are no longer Present Minimal criteria for Septal infarct are now Present Confirmed by Mir Patricia ([id number]) on 8/22/2024 8:44:50 AM    FL modified barium swallow study    Result Date: 8/19/2024  Interpreted By:  Sandee Baldwin and Brodsky Sheryl STUDY: [place] MODIFIED BARIUM SWALLOW STUDY;; 8/19/2024 1:55 pm   INDICATION: Signs/Symptoms:aspiration pneumonia.   COMPARISON: None.   ACCESSION NUMBER(S): [id number]   ORDERING CLINICIAN: MARIE DURANT   SPEECH FINDINGS: Modified Barium Swallow Study completed. Informed verbal consent obtained prior to completion of exam. Trials of thin, nectar/mildly thick liquid, puree, regular solids and barium tablet with thin liquid were given.   SLP: VON Pedro Contact info: [employer]; phone: [phone]     Reason for Referral: r/o aspiration Patient Hx: presents to the ED from home after a choking episode while eating pizza around 1300 today.. " Her  is at bedside and states that she has been having increasing choking episodes more often,past medical history of hypertension, GERD, hyperlipidemia, and history of lung cancer with radiation treatment and has been in remission for 8 years. Respiratory Status: Room air Current diet:   Pain: Pain Scale: 0-10 Ratin FINAL SPEECH RECOMMENDATIONS   DIET: - Regular (IDDSI Level 7) - Thin liquids (IDDSI Level 0) Per the results of today's MBSS, patient to continue baseline diet of regular consistencies and thin liquids following swallow strategies listed below:   STRATEGIES: - Small bites - Alternate consistencies - Upright for all PO intake - Reflux Precautions     Plan: Treatment/Interventions: Pharyngeal exercises, Patient/family education, Bolus trials, Compensatory strategy training, Diet tolerance/advancement SLP Plan: Skilled SLP warranted SLP Frequency: 1-2 follow up sessions Duration:   Discussed POC: Patient Discussed Risks/Benefits: Yes Patient/Caregiver Agreeable: Yes   Short term goals established 24: - Patient will tolerate baseline/recommended PO diet without overt s/s of aspiration, further difficulty, or concern for aspiration-related complications in 90% of observed therapeutic trials. - Patient will implement safe swallowing strategies to reduce risk of aspiration and other s/sx of dysphagia in 90% of trials given caregiver assistance/cueing as needed.     Long term goals 24: Patient will tolerate the least restrictive diet without overt difficulty or further pulmonary compromise by time of discharge.   Education Provided: Results and recommendations per MBSS, with video review; recommendations and POC at this time. Verbal understanding and agreement given on all accounts.   Additional consult suggested: GI consult d/t esophageal retention   Mechanics of the Swallow Summary: ORAL PHASE: WFL   PHARYNGEAL PHASE: WFL ESOPHAGEAL PHASE: Esophageal clearance - Esophageal retention    SLP Impressions with Severity Rating: Pt presents with no oropharyngeal swallow WFL upon completion of modified barium swallow study this date. Patient demonstrated no penetration and no aspiration was visualized during study.   OUTCOME MEASURES: Functional Oral Intake Scale Functional Oral Intake Scale: Level 7        total oral diet with no restrictions   Rosenbek's Penetration Aspiration Scale Thin Liquids: 1. NO ASPIRATION & NO PENETRATION - no aspiration, contrast does not enter airway [place] Thick Liquids: 1. NO ASPIRATION & NO PENETRATION - no aspiration, contrast does not enter airway Puree: 1. NO ASPIRATION & NO PENETRATION - no aspiration, contrast does not enter airway Solids: 1. NO ASPIRATION & NO PENETRATION - no aspiration, contrast does not enter airway Speech Therapy section of this report signed by Lynn RO/SLP on 8/19/2024 at 3:47 pm.   RADIOLOGY FINDINGS: Included portions visualized cervical spine demonstrate moderate multilevel anterior osteophyte formation C3/4 through C5/6.         1. Modified barium swallow as described above   MACRO: None   Signed by: Sandee Baldwin 8/19/2024 4:58 PM Dictation workstation:   [id number]    CT angio chest for pulmonary embolism    Result Date: 8/17/2024  Interpreted By:  Freddy Umanzor, STUDY: CT ANGIO CHEST FOR PULMONARY EMBOLISM;  8/17/2024 5:42 pm   INDICATION: Signs/Symptoms:tachycardia, hypoxia; r/o PE.   COMPARISON: Chest CT 09/11/2023   ACCESSION NUMBER(S): [id number]   ORDERING CLINICIAN: SIOMARA FALCON   TECHNIQUE: Helical data acquisition of the chest was obtained after the intravenous administration of  of contrast. Images were reformatted in coronal and sagittal planes. Axial and coronal MIP images were created and reviewed.   FINDINGS: POTENTIAL LIMITATIONS OF THE STUDY: None   HEART AND VESSELS: No discrete filling defects within the adequately visualized portions of main pulmonary artery or its branches.   Main pulmonary artery  and its branches are unremarkable in caliber.   The thoracic aorta is unchanged with respect to course, caliber, and contour.   No coronary artery calcifications are seen.The study is not optimized for evaluation of coronary arteries.   The cardiac chambers are not enlarged.   No evidence of pericardial effusion.   MEDIASTINUM AND NARCISO, LOWER NECK AND AXILLA: Peripherally calcified 1.6 cm right thyroid nodule, unchanged in size.   No evidence of thoracic lymphadenopathy by CT criteria.Subcentimeter mediastinal lymph nodes are present, nonspecific.   Esophagus appears within normal limits as seen.   LUNGS AND AIRWAYS: The trachea and central airways are patent. No endobronchial lesion.   There is been significant interval increased perihilar confluent opacification with air bronchograms noted centrally and extensive nodular and patchy airspace opacities extending into the bilateral lower lobes as well as opacification of the right paramediastinal region anteriorly within the right upper lobe. New patchy and nodular airspace opacities of the right middle lobe.   No pneumothorax or effusion.   UPPER ABDOMEN: Cholelithiasis. Remaining subdiaphragmatic findings appear stable.   CHEST WALL AND OSSEOUS STRUCTURES: No acute osseous abnormality.Multilevel degenerative changes are noted throughout the imaged spine.       1.  No evidence of pulmonary embolus. 2. Extensive bilateral bilateral lung opacification in predominantly perihilar distribution with patchy and nodular airspace opacities extending to the bilateral lower lobes and right middle lobe. 3. Remaining intrathoracic findings appear stable since comparison imaging 09/11/2023.     Signed by: Freddy Umanzor 8/17/2024 6:52 PM Dictation workstation:   [id number]    XR chest 1 view    Result Date: 8/17/2024  Interpreted By:  Deepak Weeks, STUDY: XR CHEST 1 VIEW;  8/17/2024 4:30 pm   INDICATION: Signs/Symptoms:hypoxic.   COMPARISON: None.   ACCESSION NUMBER(S):  [id number]   ORDERING CLINICIAN: ANTONY PERAZA   FINDINGS: Heart size borderline enlarged. Suspect some degree of mild underlying vascular congestion. There is some patchy atelectasis or infiltrate in the lung bases.   No pneumothorax or significant effusion.       Bibasilar patchy infiltrate or atelectasis.   MACRO: None   Signed by: Deepak Weeks 8/17/2024 5:16 PM Dictation workstation:   [id number]     Assessment/Plan   Acute hypoxic respiratory failure, resolved, on room air  Aspiration pneumonia  Dysphagia  Abnormal CT-bilateral lung opacification  History of lung cancer, in remission     Continue Zosyn while inpatient  Oxygen as needed  Repeat CT chest as advised by pulmonary - plan as outpatient  Follow-up blood cultures-pending no growth  Supportive care  Monitor temperature and WBC  Long-term plan is to de-escalate to Augmentin to complete total of 7 days    Total time spent caring for the patient today was 20 minutes.  This includes time spent before the visit reviewing the chart, time spent during the visit, and time spent after the visit on documentation.        Mallory Alexis, APRN-CNP

## 2024-08-23 NOTE — PROGRESS NOTES
Pulmonary Progress Note 08/23/24     FOLLOWUP FOR: Pneumonia    SUBJECTIVE  Breathing is okay.  She feels that she can manage at home      PHYSICAL EXAM        8/22/2024    12:59 PM 8/22/2024     3:00 PM 8/22/2024     7:44 PM 8/22/2024    11:32 PM 8/23/2024     1:03 AM 8/23/2024     3:31 AM 8/23/2024     9:00 AM   Vitals   Systolic 163 146 138 129  141 150   Diastolic 70 75 69 58  75 77   Heart Rate 94  101    83   Temp 36.3 °C (97.3 °F) 36.6 °C (97.9 °F) 37 °C (98.6 °F) 36.2 °C (97.2 °F)  36.3 °C (97.3 °F) 36.2 °C (97.2 °F)   Resp 17 18 18 19  19 18   Weight (lb)     123.24     BMI     23.29 kg/m2     BSA (m2)     1.55 m2         Intake/Output last 3 shifts:  I/O last 3 completed shifts:  In: 979 (17.5 mL/kg) [P.O.:879; IV Piggyback:100]  Out: 377 (6.7 mL/kg) [Urine:377 (0.2 mL/kg/hr)]  Weight: 55.9 kg   Intake/Output this shift:  I/O this shift:  In: 125 [P.O.:125]  Out: -       Physical Exam  Vitals reviewed.   Constitutional:       Appearance: Normal appearance.   Cardiovascular:      Rate and Rhythm: Normal rate.   Pulmonary:      Effort: Pulmonary effort is normal.      Breath sounds: Rales present. No wheezing or rhonchi.   Musculoskeletal:      Right lower leg: No edema.      Left lower leg: No edema.   Skin:     General: Skin is warm.   Neurological:      Mental Status: She is alert. Mental status is at baseline.   Psychiatric:         Mood and Affect: Mood normal.         Behavior: Behavior normal.           Scheduled medications  aspirin, 81 mg, oral, Daily  enoxaparin, 40 mg, subcutaneous, q24h  ipratropium-albuteroL, 3 mL, nebulization, TID  pantoprazole, 40 mg, oral, Daily before breakfast  piperacillin-tazobactam, 4.5 g, intravenous, q8h MONIE  pravastatin, 80 mg, oral, Nightly      Continuous medications  sodium chloride 0.9%, 100 mL/hr      PRN medications  PRN medications: benzonatate, ibuprofen, ondansetron **OR** ondansetron, polyethylene glycol, traMADol     Labs:  Lab Results    Component Value Date     (H) 2024    K 3.5 2024     (H) 2024    CO2 27 2024    BUN 10 2024    CREATININE 0.70 2024    GLUCOSE 97 2024    CALCIUM 9.5 2024     Lab Results   Component Value Date    WBC 4.8 2024    HGB 12.2 2024    HCT 37.4 2024    MCV 84 2024     2024       Imaging:  FL modified barium swallow study    Result Date: 2024  Interpreted By:  Sandee Baldwin and Brodsky Sheryl STUDY: FL MODIFIED BARIUM SWALLOW STUDY;; 2024 1:55 pm   INDICATION: Signs/Symptoms:aspiration pneumonia.   COMPARISON: None.   ACCESSION NUMBER(S): [id number]   ORDERING CLINICIAN: MARIE DURANT   SPEECH FINDINGS: Modified Barium Swallow Study completed. Informed verbal consent obtained prior to completion of exam. Trials of thin, nectar/mildly thick liquid, puree, regular solids and barium tablet with thin liquid were given.   SLP: Lynn Fitch, SLP Contact info: [employer] secure chat; phone: [phone]     Reason for Referral: r/o aspiration Patient Hx: presents to the ED from home after a choking episode while eating pizza around 1300 today.. Her  is at bedside and states that she has been having increasing choking episodes more often,past medical history of hypertension, GERD, hyperlipidemia, and history of lung cancer with radiation treatment and has been in remission for 8 years. Respiratory Status: Room air Current diet:   Pain: Pain Scale: 0-10 Ratin FINAL SPEECH RECOMMENDATIONS   DIET: - Regular (IDDSI Level 7) - Thin liquids (IDDSI Level 0) Per the results of today's MBSS, patient to continue baseline diet of regular consistencies and thin liquids following swallow strategies listed below:   STRATEGIES: - Small bites - Alternate consistencies - Upright for all PO intake - Reflux Precautions     Plan: Treatment/Interventions: Pharyngeal exercises, Patient/family education, Bolus trials,  Compensatory strategy training, Diet tolerance/advancement SLP Plan: Skilled SLP warranted SLP Frequency: 1-2 follow up sessions Duration:   Discussed POC: Patient Discussed Risks/Benefits: Yes Patient/Caregiver Agreeable: Yes   Short term goals established 08/19/24: - Patient will tolerate baseline/recommended PO diet without overt s/s of aspiration, further difficulty, or concern for aspiration-related complications in 90% of observed therapeutic trials. - Patient will implement safe swallowing strategies to reduce risk of aspiration and other s/sx of dysphagia in 90% of trials given caregiver assistance/cueing as needed.     Long term goals 08/19/24: Patient will tolerate the least restrictive diet without overt difficulty or further pulmonary compromise by time of discharge.   Education Provided: Results and recommendations per MBSS, with video review; recommendations and POC at this time. Verbal understanding and agreement given on all accounts.   Additional consult suggested: GI consult d/t esophageal retention   Mechanics of the Swallow Summary: ORAL PHASE: WFL   PHARYNGEAL PHASE: WFL ESOPHAGEAL PHASE: Esophageal clearance - Esophageal retention   SLP Impressions with Severity Rating: Pt presents with no oropharyngeal swallow WFL upon completion of modified barium swallow study this date. Patient demonstrated no penetration and no aspiration was visualized during study.   OUTCOME MEASURES: Functional Oral Intake Scale Functional Oral Intake Scale: Level 7        total oral diet with no restrictions   Rosenbek's Penetration Aspiration Scale Thin Liquids: 1. NO ASPIRATION & NO PENETRATION - no aspiration, contrast does not enter airway Seffner Thick Liquids: 1. NO ASPIRATION & NO PENETRATION - no aspiration, contrast does not enter airway Puree: 1. NO ASPIRATION & NO PENETRATION - no aspiration, contrast does not enter airway Solids: 1. NO ASPIRATION & NO PENETRATION - no aspiration, contrast does not enter  airway Speech Therapy section of this report signed by Lynn RO/SLP on 8/19/2024 at 3:47 pm.   RADIOLOGY FINDINGS: Included portions visualized cervical spine demonstrate moderate multilevel anterior osteophyte formation C3/4 through C5/6.         1. Modified barium swallow as described above   MACRO: None   Signed by: Sandee Baldwin 8/19/2024 4:58 PM Dictation workstation:   [id number]    ECG 12 lead    Result Date: 8/19/2024  Sinus tachycardia Left axis deviation Left ventricular hypertrophy with repolarization abnormality ( R in aVL , Karval product ) Cannot rule out Septal infarct , age undetermined Abnormal ECG When compared with ECG of 05-MAR-2018 17:28, Premature ventricular complexes are no longer Present Minimal criteria for Septal infarct are now Present    CT angio chest for pulmonary embolism    Result Date: 8/17/2024  Interpreted By:  Freddy Umanzor, STUDY: CT ANGIO CHEST FOR PULMONARY EMBOLISM;  8/17/2024 5:42 pm   INDICATION: Signs/Symptoms:tachycardia, hypoxia; r/o PE.   COMPARISON: Chest CT 09/11/2023   ACCESSION NUMBER(S): [id number]   ORDERING CLINICIAN: SIOMARA FALCON   TECHNIQUE: Helical data acquisition of the chest was obtained after the intravenous administration of  of contrast. Images were reformatted in coronal and sagittal planes. Axial and coronal MIP images were created and reviewed.   FINDINGS: POTENTIAL LIMITATIONS OF THE STUDY: None   HEART AND VESSELS: No discrete filling defects within the adequately visualized portions of main pulmonary artery or its branches.   Main pulmonary artery and its branches are unremarkable in caliber.   The thoracic aorta is unchanged with respect to course, caliber, and contour.   No coronary artery calcifications are seen.The study is not optimized for evaluation of coronary arteries.   The cardiac chambers are not enlarged.   No evidence of pericardial effusion.   MEDIASTINUM AND NARCISO, LOWER NECK AND AXILLA: Peripherally calcified 1.6  cm right thyroid nodule, unchanged in size.   No evidence of thoracic lymphadenopathy by CT criteria.Subcentimeter mediastinal lymph nodes are present, nonspecific.   Esophagus appears within normal limits as seen.   LUNGS AND AIRWAYS: The trachea and central airways are patent. No endobronchial lesion.   There is been significant interval increased perihilar confluent opacification with air bronchograms noted centrally and extensive nodular and patchy airspace opacities extending into the bilateral lower lobes as well as opacification of the right paramediastinal region anteriorly within the right upper lobe. New patchy and nodular airspace opacities of the right middle lobe.   No pneumothorax or effusion.   UPPER ABDOMEN: Cholelithiasis. Remaining subdiaphragmatic findings appear stable.   CHEST WALL AND OSSEOUS STRUCTURES: No acute osseous abnormality.Multilevel degenerative changes are noted throughout the imaged spine.       1.  No evidence of pulmonary embolus. 2. Extensive bilateral bilateral lung opacification in predominantly perihilar distribution with patchy and nodular airspace opacities extending to the bilateral lower lobes and right middle lobe. 3. Remaining intrathoracic findings appear stable since comparison imaging 09/11/2023.     Signed by: Freddy Umanzor 8/17/2024 6:52 PM Dictation workstation:   [id number]    XR chest 1 view    Result Date: 8/17/2024  Interpreted By:  Deepak Weeks, STUDY: XR CHEST 1 VIEW;  8/17/2024 4:30 pm   INDICATION: Signs/Symptoms:hypoxic.   COMPARISON: None.   ACCESSION NUMBER(S): [id number]   ORDERING CLINICIAN: ANTONY PERAZA   FINDINGS: Heart size borderline enlarged. Suspect some degree of mild underlying vascular congestion. There is some patchy atelectasis or infiltrate in the lung bases.   No pneumothorax or significant effusion.       Bibasilar patchy infiltrate or atelectasis.   MACRO: None   Signed by: Deepak Weeks 8/17/2024 5:16 PM Dictation workstation:    [id number]            Assessment/Plan   Active Problems:  There are no active Hospital Problems.      Acute respiratory failure with hypoxia: Resolved  Remains on room air  Acute bronchospasm: Resolved  Continue with bronchodilators  Aspiration pneumonia  Antibiotics per ID  History of lung cancer: In remission  Repeat imaging in 6 to 8 weeks to document clearance  Pulmonary disposition  No absolute contraindications for discharge    Follow-up in 7 to 10 days where we can see clinically how she is doing and discuss timing of repeat chest CT     LOS: 6 days       Carlie Josue MD  Pulmonary/Critical Care medicine

## 2024-08-23 NOTE — NURSING NOTE
Patient discharged home.  IV removed.  Catheter intact. Patient tolerated procedure well.  Discharge instructions provided to patient.  Copy placed in chart.  Patient taken via wheelchair to automobile.  Patient discharged with  and all belongings.

## 2024-08-23 NOTE — CARE PLAN
Problem: Pain - Adult  Goal: Verbalizes/displays adequate comfort level or baseline comfort level  Outcome: Progressing     Problem: Safety - Adult  Goal: Free from fall injury  Outcome: Progressing     Problem: Discharge Planning  Goal: Discharge to home or other facility with appropriate resources  Outcome: Progressing     Problem: Chronic Conditions and Co-morbidities  Goal: Patient's chronic conditions and co-morbidity symptoms are monitored and maintained or improved  Outcome: Progressing     Problem: Aspiration  Goal: I will be free from signs and symptoms of aspiration or complications if aspiration has occurred  Outcome: Progressing     Problem: Respiratory  Goal: Wean oxygen to maintain O2 saturation per order/standard this shift  Outcome: Progressing   The patient's goals for the shift include      The clinical goals for the shift include Remain free of falls throughout the shift

## 2024-08-23 NOTE — CARE PLAN
The patient's goals for the shift include      The clinical goals for the shift include education of sitting upright, chewing fiids slowly and tucking chin in to swallow to help prevent choking.     Problem: Pain - Adult  Goal: Verbalizes/displays adequate comfort level or baseline comfort level  Outcome: Progressing     Problem: Safety - Adult  Goal: Free from fall injury  Outcome: Progressing     Problem: Discharge Planning  Goal: Discharge to home or other facility with appropriate resources  Outcome: Progressing     Problem: Chronic Conditions and Co-morbidities  Goal: Patient's chronic conditions and co-morbidity symptoms are monitored and maintained or improved  Outcome: Progressing     Problem: Aspiration  Goal: I will be free from signs and symptoms of aspiration or complications if aspiration has occurred  Outcome: Progressing     Problem: Respiratory  Goal: Wean oxygen to maintain O2 saturation per order/standard this shift  Outcome: Progressing

## 2024-08-23 NOTE — PROGRESS NOTES
08/23/24 0829   Discharge Planning   Expected Discharge Disposition Home  (Refusing [facility])

## 2024-08-23 NOTE — CARE PLAN
Problem: Pain - Adult  Goal: Verbalizes/displays adequate comfort level or baseline comfort level  8/23/2024 1002 by Keya Doran RN  Outcome: Met  8/23/2024 1002 by Keya Doran RN  Outcome: Progressing     Problem: Safety - Adult  Goal: Free from fall injury  8/23/2024 1002 by Keya Doran RN  Outcome: Met  8/23/2024 1002 by Keya Doran RN  Outcome: Progressing     Problem: Discharge Planning  Goal: Discharge to home or other facility with appropriate resources  8/23/2024 1002 by Keya Doran RN  Outcome: Met  8/23/2024 1002 by Keya Doran RN  Outcome: Progressing     Problem: Chronic Conditions and Co-morbidities  Goal: Patient's chronic conditions and co-morbidity symptoms are monitored and maintained or improved  8/23/2024 1002 by Keya Doran RN  Outcome: Met  8/23/2024 1002 by Keya Doran RN  Outcome: Progressing     Problem: Aspiration  Goal: I will be free from signs and symptoms of aspiration or complications if aspiration has occurred  8/23/2024 1002 by Keya Doran RN  Outcome: Met  8/23/2024 1002 by Keya Doran RN  Outcome: Progressing     Problem: Respiratory  Goal: Wean oxygen to maintain O2 saturation per order/standard this shift  8/23/2024 1002 by Keya Doran RN  Outcome: Met  8/23/2024 1002 by Keya Doran RN  Outcome: Progressing   The patient's goals for the shift include      The clinical goals for the shift include

## 2024-08-23 NOTE — DISCHARGE SUMMARY
Discharge Diagnosis  Pneumonia    Issues Requiring Follow-Up      Discharge Meds     Your medication list        START taking these medications        Instructions Last Dose Given Next Dose Due   albuterol 90 mcg/actuation inhaler      Inhale 2 puffs every 6 hours if needed for wheezing.       amoxicillin-pot clavulanate 875-125 mg tablet  Commonly known as: Augmentin      Take 1 tablet by mouth every 12 hours for 7 days.       benzonatate 100 mg capsule  Commonly known as: Tessalon      Take 1 capsule (100 mg) by mouth 3 times a day as needed for cough. Do not crush or chew.              CONTINUE taking these medications        Instructions Last Dose Given Next Dose Due   amLODIPine 5 mg tablet  Commonly known as: Norvasc           aspirin 81 mg EC tablet           cholecalciferol 50,000 unit capsule  Commonly known as: Vitamin D-3           Vitamin D3 50 mcg (2,000 unit) capsule  Generic drug: cholecalciferol           diphenhydrAMINE 50 mg capsule  Commonly known as: BENADryl           EPINEPHrine 0.3 mg/0.3 mL injection syringe  Commonly known as: Epipen           ergocalciferol 1.25 MG (43188 UT) capsule  Commonly known as: Vitamin D-2           FISH OIL CONCENTRATE ORAL           gabapentin 100 mg capsule  Commonly known as: Neurontin           hydrocortisone 2.5 % cream           ibuprofen 200 mg tablet           loratadine 10 mg tablet  Commonly known as: Claritin           omeprazole 40 mg DR capsule  Commonly known as: PriLOSEC           pantoprazole 40 mg EC tablet  Commonly known as: ProtoNix           pravastatin 80 mg tablet  Commonly known as: Pravachol           traMADol 50 mg tablet  Commonly known as: Ultram                     Where to Get Your Medications        [facility]  [address] [facility] [address]      Hours: 9 AM to 6 PM Mon-Fri, 9 AM to 1 PM Sat Phone: [phone]   albuterol 90 mcg/actuation inhaler  amoxicillin-pot clavulanate  875-125 mg tablet  benzonatate 100 mg capsule         Test Results Pending At Discharge  Pending Labs       No current pending labs.            Hospital Course   The patient was treated with pna, s/s improved. She was tolerating a diet, stable for dc home with close outpatient follow up.     Pertinent Physical Exam At Time of Discharge      Physical Exam     Constitutional:       Appearance: Elderly, well-built, in no distress  HENT:      Head: Normocephalic and atraumatic.   Eyes:      Extraocular Movements: Extraocular movements intact.      Pupils: Pupils are equal, round, and reactive to light.   Cardiovascular:      Rate and Rhythm: Normal rate and regular rhythm.      Pulses: Normal pulses.      Heart sounds: Normal heart sounds.   Pulmonary:      Effort: Pulmonary effort is normal.      Breath sounds: Few rhonchi, faint wheeze, diminished bases  Abdominal:      General: Abdomen is flat. Bowel sounds are normal.      Palpations: Abdomen is soft.   Musculoskeletal:         General: Normal range of motion.      Cervical back: Normal range of motion and neck supple.   Skin:     General: Skin is warm and dry.   Neurological:      General: No focal deficit present.      Mental Status: Alert and oriented x 3, speech fluent.    Future Appointments   Date Time Provider Department Center   9/19/2024  9:00 AM CAT M[facility]OR CT [facility] [place] [facility]   9/19/2024  9:30 AM CAT [facility] MRI [facility] [place] [facility]   9/26/2024  9:40 AM Shahrzad Kincaid MD MPH [id number] [facility]   10/1/2024 11:00 AM Juancarlos Yanez MD [id number] [facility]   11/4/2024  9:30 AM Papo Rueda MD [id number] [facility]     Time and care for discharge management > 30 minutes.       Michael Bingham MD

## 2024-09-19 ENCOUNTER — HOSPITAL ENCOUNTER (OUTPATIENT)
Dept: RADIOLOGY | Facility: CLINIC | Age: 74
Discharge: HOME | End: 2024-09-19
Payer: MEDICARE

## 2024-09-19 DIAGNOSIS — C79.31 MALIGNANT MELANOMA METASTATIC TO BRAIN (MULTI): ICD-10-CM

## 2024-09-19 PROCEDURE — 70553 MRI BRAIN STEM W/O & W/DYE: CPT | Performed by: RADIOLOGY

## 2024-09-19 PROCEDURE — A9575 INJ GADOTERATE MEGLUMI 0.1ML: HCPCS | Performed by: STUDENT IN AN ORGANIZED HEALTH CARE EDUCATION/TRAINING PROGRAM

## 2024-09-19 PROCEDURE — 71250 CT THORAX DX C-: CPT

## 2024-09-19 PROCEDURE — 2550000001 HC RX 255 CONTRASTS: Performed by: STUDENT IN AN ORGANIZED HEALTH CARE EDUCATION/TRAINING PROGRAM

## 2024-09-19 PROCEDURE — 70553 MRI BRAIN STEM W/O & W/DYE: CPT

## 2024-09-19 RX ORDER — GADOTERATE MEGLUMINE 376.9 MG/ML
14 INJECTION INTRAVENOUS
Status: COMPLETED | OUTPATIENT
Start: 2024-09-19 | End: 2024-09-19

## 2024-09-25 NOTE — PROGRESS NOTES
Subjective:   Patient Name: Teresa Max    : 1950     MRN: [id number]     Age: 73 y.o.     Gender: female  Referring Provider: SKIP    CC: Management of stage IIIB (T1cX5G2) lung adenocarcinoma of OLIVER dx in 2015 s/p concurrent chemoRTàmetastatic recurrence  in 2016 with brain lesion, axillary LNs s/p gamma knife, alectinib 2016-2018-->again recurrence in 2018 s/p keytruda  2018-2020. PDL-1 50%, NGS showed ALK fusion. GALLO. TMB 7.9. Currently on surveillance.     Presenting History (2023):  At time of initial presentation a 72 F, remote lighter smoker (when she was a teenage) with PMHx of DCISof L breast in , HTN, HLD, NSCLC presented here for continued care of her lung cancer. She was initially diagnosed with stage IIIB (T1N3M0) on  2015. PET/CT on 2015 showed primary lung carcinoma in the OLIVER measuring 1.2cm, with large L and R hilar LAD. Brain MRI (+/-) 2015 no brain metastases. She underwent concurrent chemoRT with cisplatin and pemetrexed completed in 2015.  She did not tolerate very well.     Unfortunately, she had metastatic recurrence in 2016 with PET/CT on 2016 showed high hypermetabolic activity in the L axillary LNs, consistent with mets, which was biopsy proven to be metastatic lung adenocarcinoma. Brain MRI (+/-) on 2016  showed bilateral enhancing lesions within the occipital lobes.She underwent gamma knife with Dr. Diaz to 3 brain lesions. She was then started on alectinib 600mg bid in 2016.     Unfortunately, her PET/CT on 1/3/2018 showed POS with interval worsening of hypermetabolic L axillary and retropectoral LAD. Brain MRI 2018: unchanged. L axillary LN biopsy on 2018 which showed poorly differentiated lung adenocarcinoma. PDL-1  50%. NGS showed ALK (20)-EML4(6) fusion, and IDH1 sP586M (c338T>C) mutation, TMB 7.9 m/MB, MSI stable. She was then started on Keytruda on 2018-2020. She has been on  surveillance since. Most recent CT CAP (+) on 3/6/2023 showed no evidence  of recurrence except for mosaic lung pattern and RML fibrosis. 4.3cm L adnexal cyst. Brain MRI (+/-) 03/01/2023: Interval decreased size of the enhancing lesion within the left occipital lobe measuring 0.4 cm, previously 0.5 cm. Decreased surrounding  T2 and FLAIR hyperintense signal compatible with vasogenic edema. No new enhancing lesions.     She has been having chronic back pain, worsening with standing and moving, intermittently, tolerable. No fever or chills. No cough or SOB. No n/v/d/c. No HA or vision change.      PMHx: DCIS 2008, HTN, HLD, NSCLC  PSHx: Breast lumpectomy in 2008  SHx: Remote light smoker, no etoh or illicit drugs. She worked at [employer]. Lives with . 3 children and 4 grandchildren.   FHx: Mother had brain tumor (unclear primary site). No other family hx of cancer.         Treatment Summary:  05/2015-07/2015: Concurrent chemoRT with cisplatin/pemetrexed  03/2016: Gamma knife to 3 brain mets (Dr. Mickey Diaz) L occipital (18 Gy x 1 fraction), R occipital (20 Gy x 1 fraction) and L precuneus (20 Gy x 1 fraction)  04/2016 - 06/2018: started Alectinib 300mg bid-->600mg bid PO  06/11/2018-08/26/2020: Keytruda    Interval History (9/25/2024):  She returned for a follow up. She is currently following with sleep medicine and being evaluated for MYRON. Overall feeling well except for some fatigue. Stable chronic back pain and L knee pain for which is managed by her PCP. Otherwise no cough, SOB, n/v/d/c, fever or chills. Eating and voiding well.         ROS:  Patient denies the below review of systems, except for changes as noted in the interval history.    General:  Fatigue, anorexia, fevers, sweats, chills, heat/cold intolerance, weight changes.  HEENT:  Icterus, congestion, sore throat, rhinorrhea, taste change, voice changes.  Neurology:  Headache, dizziness, vision changes, hearing changes, other motor/sensory loss,  gait instability, neuropathies.  Pulmonology:  Cough, wheezing, hemoptysis, dyspnea at rest, dyspnea on exertion, pleuritic chest pain.  Cardiology:  Chest pain, palpitations, orthopnea, paroxysmal nocturnal dyspnea.  Gastroenterology:  Nausea, vomiting, reflux symptoms, abdominal pain, constipation, diarrhea, melena, bright red blood per rectum.  Genitourinary:  Dysuria, polyuria, urine color change, urine smell change, hematuria.   Musculoskeletal:  Arthralgias, myalgias, joint swelling, focal weakness.  Skin:  Rash, pruritis, jaundice, petechiae, nail changes, skin nodules/growth.  Psychology:  Anxiety, depression, suicidal ideation, homicidal ideation.  Heme:  Easy bleeding or bruising.  Others:   All other systems reviewed and are negative.    Medical History:   Past Medical History:   Diagnosis Date    Breast cancer (Multi)     rt breast cancer with lumpectomy and radiation    Combined forms of age-related cataract of left eye     Disorder of refraction     Eyelid inflammation     Intraductal carcinoma in situ of unspecified breast 02/19/2018    DCIS (ductal carcinoma in situ) of breast    Malignant neoplasm of unspecified part of unspecified bronchus or lung (Multi) 02/17/2016    Non-small cell lung cancer (NSCLC)    Open angle with borderline findings, low risk, bilateral     Personal history of irradiation     Pseudophakia        Surgical History:   Past Surgical History:   Procedure Laterality Date    BREAST LUMPECTOMY  02/17/2016    Breast Surgery Lumpectomy    CATARACT EXTRACTION Right     2016 Dr Barry    COLONOSCOPY      polypectomy    CT GUIDED IMAGING FOR NEEDLE PLACEMENT  05/22/2015    CT GUIDED IMAGING FOR NEEDLE PLACEMENT [facility]    INTRAOCULAR LENS INSERTION      OTHER SURGICAL HISTORY  02/17/2016    Biopsy Lung Percutaneous    OTHER SURGICAL HISTORY  07/21/2021    Tonsillectomy with adenoidectomy    OTHER SURGICAL HISTORY  02/16/2018    Radiation Therapy    UPPER GASTROINTESTINAL  ENDOSCOPY  2023    GERD - Dr. Germaine Liraino    US GUIDED THYROID BIOPSY  11/07/2023    US GUIDED THYROID BIOPSY 11/7/2023 CAT US       Family History:  Family History   Problem Relation Name Age of Onset    Other (brain tumor) Mother      Cancer Mother         Allergies:  Allergies   Allergen Reactions    Acetaminophen Anaphylaxis, Hives, Itching and Rash    Azithromycin Anaphylaxis, Hives, Itching and Rash    Cpm-Pseudoephed-Acetaminophen Anaphylaxis    Guaifenesin Anaphylaxis and Rash    Hydrocodone Hives and Itching    Smnnsbgfu-Vt-Hcivoehv-Guaifen Unknown    Ceftriaxone Itching       Meds (Current):    Current Outpatient Medications:     albuterol 90 mcg/actuation inhaler, Inhale 2 puffs every 6 hours if needed for wheezing., Disp: 18 g, Rfl: 11    amLODIPine (Norvasc) 5 mg tablet, , Disp: , Rfl:     aspirin 81 mg EC tablet, Take 1 tablet (81 mg) by mouth once daily. AS DIRECTED., Disp: , Rfl:     benzonatate (Tessalon) 100 mg capsule, Take 1 capsule (100 mg) by mouth 3 times a day as needed for cough. Do not crush or chew., Disp: 20 capsule, Rfl: 0    cholecalciferol (Vitamin D-3) 1,250 mcg (50,000 unit) capsule, Take 1 capsule (50,000 Units) by mouth 1 (one) time per week., Disp: , Rfl:     cholecalciferol (Vitamin D3) 50 mcg (2,000 unit) capsule, Take 1 capsule (50 mcg) by mouth once daily., Disp: , Rfl:     diphenhydrAMINE (BENADryl) 50 mg capsule, Take 1 capsule (50 mg) by mouth every 6 hours if needed for itching., Disp: , Rfl:     EPINEPHrine 0.3 mg/0.3 mL injection syringe, Inject 0.3 mL (0.3 mg) as directed 1 time if needed (shortness of breath)., Disp: , Rfl:     ergocalciferol (Vitamin D-2) 1.25 MG (82572 UT) capsule, Take 1 capsule (1,250 mcg) by mouth., Disp: , Rfl:     gabapentin (Neurontin) 100 mg capsule, , Disp: , Rfl:     hydrocortisone 2.5 % cream, Apply 1 Application topically 2 times a day., Disp: , Rfl:     ibuprofen 200 mg tablet, Take 1 tablet (200 mg) by mouth every 6 hours if needed.,  Disp: , Rfl:     loratadine (Claritin) 10 mg tablet, Take 1 tablet (10 mg) by mouth once daily., Disp: , Rfl:     omega-3 fatty acids (FISH OIL CONCENTRATE ORAL), Take by mouth., Disp: , Rfl:     omeprazole (PriLOSEC) 40 mg DR capsule, Take 1 capsule (40 mg) by mouth once daily in the morning. Take before meals., Disp: , Rfl:     pantoprazole (ProtoNix) 40 mg EC tablet, Take 1 tablet (40 mg) by mouth once daily in the morning. Take before meals., Disp: , Rfl:     pravastatin (Pravachol) 80 mg tablet, Take 1 tablet (80 mg) by mouth once daily., Disp: , Rfl:     traMADol (Ultram) 50 mg tablet, Take 1 tablet (50 mg) by mouth., Disp: , Rfl:     Pain Assessment:  Pain is: mild, back and L knee pain    Performance Status (ECOG): 1         ECOG Definition  0 Fully active; no performance restrictions.  1 Strenuous physical activity restricted; fully ambulatory and able to carry out light work.  2 Capable of all self-care but unable to carry out any work activities. Up and about >50% of waking hours.  3 Capable of only limited self-care; confined to bed or chair >50% of waking hours.  4 Completely disabled; cannot carry out any self-care; totally confined to bed or chair.      Exam:  There were no vitals taken for this visit.  General: Well appearing, no acute distress  Neurology: Alert and oriented x3, CN II-XII grossly intact  Psychology: Affect appropriate  Eyes: PERRL, EOMI  ENT: Mucus membranes moist and intact, no ulcers  Lymphatics: No palpable adenopathy  Neck: Supple, no masses  Respiratory: Lungs clear bilaterally, no wheezes, no rales, no rhonchi  Cardiovascular: Normal rate and rhythm, no murmur  Abdomen: Soft, non-tender, non-distended, normoactive, no hepatosplenomegaly, no ascites  Musculoskeletal: Normal gait and stance  Extremities: No clubbing, no cyanosis, no edema  Skin: No rash  Genitourinary: Deferred  Rectal: Deferred   Pelvic: Deferred  Breasts: Deferred    Labs:  Reviewed  Assessment/Plan:   73 y.o.  female with past medical history as stated referred for management of lung cancer    The patient's records and imaging have been reviewed in detail.  I have discussed with the patient the natural history of their disease at length.  The following has also been discussed with the patient:    # Cancer:  Management of stage IIIB ([id number]) lung adenocarcinoma of OLIVER dx in 05/2015 s/p concurrent chemoRT -->metastatic recurrence in 01/2016 with brain lesion, axillary LNs s/p gamma knife, alectinib 04/2016-06/2018 --> again recurrence in 06/2018 s/p keytruda 06/2018-08/2020. PDL-1 50%, NGS showed ALK fusion. GALLO. TMB 7.9. Currently on surveillance. Continue to do well. No evidence of recurrence.      - Interval Imaging: CT Chest (-) on 09/19/2024 showed 1. No evidence of disease recurrence within the chest. 2. A stable 4 mm nodule is seen in the right upper lobe, which bears attention on follow-up imaging. 3. Stable bilateral perihilar opacification with architectural distortion, compatible with postradiation changes. 4. Stable bilateral lower lobe predominant mosaic attenuation, likely reflecting small vessel/airway disease. 5. Right breast masslike opacification, similar to prior exam, likely reflecting post lumpectomy changes, correlation with mammography is recommended. MRI brain (+/-) 9/19/2024: Unchanged 4 mm enhancing lesion in the left occipital lobe with mild surrounding edema versus gliosis.  No acute intracranial abnormality. Moderate microangiopathic disease. No new or enlarging enhancing lesions. Repeat CT chest and MRI brain in 6 months.    # DCIS: dx in 2008, s/p lumpectomy and RT and 5 years of hormonal therapy. Most recent mammogram 02/2023: no evidence of recurrence.  To follow up with her GYN.     # Lower back pain: no evidence of mets. Follow up with PT an dPC.     # L adnexal cyst: US showed fibroid uterus and L ovarian cyst. To follow with her GYN    # Thyroid nodule: s/p thyroid nodule biopsy on  4/20/2021: benign follicular nodule.     # Clinical Trials:  None currently.      # Access: Peripheral veins.     # Pain: No acute pain.     # Bone Health: No signs of bony disease.      # GI/CINV: No acute issues.  Eating and voiding well.  Nutrition consult offered.     # Smoking: Former smoker     # Fertility: N/A.  Oncofertility support available as needed.       # Heme/ESAs: N/A.     # GOC: Patient is aware of palliative intent goals of care.     # Language: English.     # Dispo: RTC in 6 months

## 2024-09-26 ENCOUNTER — OFFICE VISIT (OUTPATIENT)
Dept: HEMATOLOGY/ONCOLOGY | Facility: CLINIC | Age: 74
End: 2024-09-26
Payer: MEDICARE

## 2024-09-26 VITALS
HEART RATE: 83 BPM | DIASTOLIC BLOOD PRESSURE: 77 MMHG | SYSTOLIC BLOOD PRESSURE: 143 MMHG | BODY MASS INDEX: 28.91 KG/M2 | RESPIRATION RATE: 16 BRPM | OXYGEN SATURATION: 96 % | TEMPERATURE: 97.2 F | WEIGHT: 153.11 LBS

## 2024-09-26 DIAGNOSIS — C34.12 PRIMARY ADENOCARCINOMA OF UPPER LOBE OF LEFT LUNG (MULTI): Primary | ICD-10-CM

## 2024-09-26 DIAGNOSIS — C79.31 MALIGNANT MELANOMA METASTATIC TO BRAIN (MULTI): ICD-10-CM

## 2024-09-26 DIAGNOSIS — M25.562 CHRONIC PAIN OF BOTH KNEES: ICD-10-CM

## 2024-09-26 DIAGNOSIS — G89.29 CHRONIC LOW BACK PAIN, UNSPECIFIED BACK PAIN LATERALITY, UNSPECIFIED WHETHER SCIATICA PRESENT: ICD-10-CM

## 2024-09-26 DIAGNOSIS — M25.561 CHRONIC PAIN OF BOTH KNEES: ICD-10-CM

## 2024-09-26 DIAGNOSIS — G89.29 CHRONIC PAIN OF BOTH KNEES: ICD-10-CM

## 2024-09-26 DIAGNOSIS — M54.50 CHRONIC LOW BACK PAIN, UNSPECIFIED BACK PAIN LATERALITY, UNSPECIFIED WHETHER SCIATICA PRESENT: ICD-10-CM

## 2024-09-26 PROCEDURE — 99214 OFFICE O/P EST MOD 30 MIN: CPT | Performed by: STUDENT IN AN ORGANIZED HEALTH CARE EDUCATION/TRAINING PROGRAM

## 2024-09-26 PROCEDURE — 1125F AMNT PAIN NOTED PAIN PRSNT: CPT | Performed by: STUDENT IN AN ORGANIZED HEALTH CARE EDUCATION/TRAINING PROGRAM

## 2024-09-26 ASSESSMENT — COLUMBIA-SUICIDE SEVERITY RATING SCALE - C-SSRS
1. IN THE PAST MONTH, HAVE YOU WISHED YOU WERE DEAD OR WISHED YOU COULD GO TO SLEEP AND NOT WAKE UP?: NO
2. HAVE YOU ACTUALLY HAD ANY THOUGHTS OF KILLING YOURSELF?: NO
6. HAVE YOU EVER DONE ANYTHING, STARTED TO DO ANYTHING, OR PREPARED TO DO ANYTHING TO END YOUR LIFE?: NO

## 2024-09-26 ASSESSMENT — PAIN SCALES - GENERAL: PAINLEVEL_OUTOF10: 6

## 2024-09-26 ASSESSMENT — PATIENT HEALTH QUESTIONNAIRE - PHQ9
2. FEELING DOWN, DEPRESSED OR HOPELESS: NOT AT ALL
1. LITTLE INTEREST OR PLEASURE IN DOING THINGS: NOT AT ALL
SUM OF ALL RESPONSES TO PHQ9 QUESTIONS 1 AND 2: 0

## 2024-10-01 ENCOUNTER — APPOINTMENT (OUTPATIENT)
Dept: ENDOCRINOLOGY | Facility: CLINIC | Age: 74
End: 2024-10-01
Payer: MEDICARE

## 2024-10-01 VITALS
HEART RATE: 85 BPM | SYSTOLIC BLOOD PRESSURE: 146 MMHG | BODY MASS INDEX: 28.89 KG/M2 | DIASTOLIC BLOOD PRESSURE: 81 MMHG | WEIGHT: 153 LBS

## 2024-10-01 DIAGNOSIS — E04.2 NONTOXIC MULTINODULAR GOITER: Primary | ICD-10-CM

## 2024-10-01 PROCEDURE — 1160F RVW MEDS BY RX/DR IN RCRD: CPT | Performed by: INTERNAL MEDICINE

## 2024-10-01 PROCEDURE — 1159F MED LIST DOCD IN RCRD: CPT | Performed by: INTERNAL MEDICINE

## 2024-10-01 PROCEDURE — 99214 OFFICE O/P EST MOD 30 MIN: CPT | Performed by: INTERNAL MEDICINE

## 2024-10-01 PROCEDURE — 1036F TOBACCO NON-USER: CPT | Performed by: INTERNAL MEDICINE

## 2024-10-01 RX ORDER — MELOXICAM 15 MG/1
TABLET ORAL
COMMUNITY
Start: 2024-07-25

## 2024-10-01 ASSESSMENT — ENCOUNTER SYMPTOMS
WEAKNESS: 1
PALPITATIONS: 0
NERVOUS/ANXIOUS: 0
ABDOMINAL PAIN: 0
SHORTNESS OF BREATH: 0
NECK PAIN: 0
ROS SKIN COMMENTS: DRY
NAUSEA: 0
TREMORS: 0
FEVER: 0
APNEA: 1
FATIGUE: 1
HEADACHES: 0
VOMITING: 0
WHEEZING: 1
UNEXPECTED WEIGHT CHANGE: 0
TROUBLE SWALLOWING: 1
DIARRHEA: 0
COUGH: 1
CONSTIPATION: 0

## 2024-10-01 NOTE — PATIENT INSTRUCTIONS
RECOMMENDATIONS  Ultrasound thyroid  Results available on [employer]  Call/message if you have not received results in 3 days.       If no suspicious changes, plan follow up 1 year  Repeat ultrasound thyroid before next appointment

## 2024-10-01 NOTE — LETTER
October 1, 2024     Michael Bingham MD  [address] [place] [address]  [place] [address]    Patient: Teresa Max   YOB: 1950   Date of Visit: 10/1/2024       Dear Dr. Michael Bingham MD:    Thank you for referring Teresa Max to me for evaluation. Below are my notes for this consultation.  If you have questions, please do not hesitate to call me. I look forward to following your patient along with you.       Sincerely,     Juancarlos Yanez MD      CC: No Recipients  ______________________________________________________________________________________    History Of Present Illness  Teresa Max is a 73 y.o. female with multinodular goiter    FNAB two right lobe nodules benign (4/20/21). Mid-lobe nodule FLUS by cytology, benign by ThyraMir/ThyGeNEXT molecular testing  FNAB right superior nodule benign (11/7/23)    No neck symptoms    History of choking, dysphagia  Swallowing tests done in the hospital this summer  History of admission for pneumonia.       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    Surgical History  She has a past surgical history that includes Other surgical history (02/17/2016); Breast lumpectomy (02/17/2016); Other surgical history (07/21/2021); Other surgical history (02/16/2018); CT guided imaging for needle placement (05/22/2015); Cataract extraction (Right); Intraocular lens insertion; US guided thyroid biopsy (11/07/2023); Colonoscopy; and Upper gastrointestinal endoscopy (2023).     Social History  She reports that she has never smoked. She has never used smokeless tobacco. She  reports current alcohol use. She reports that she does not use drugs.    Family History  Family History   Problem Relation Name Age of Onset   • Other (brain tumor) Mother     • Cancer Mother         Medications  Current Outpatient Medications   Medication Instructions   • amLODIPine (Norvasc) 5 mg tablet    • aspirin 81 mg, oral, Daily, AS DIRECTED.   • cholecalciferol (VITAMIN D-3) 50,000 Units, oral, Once Weekly   • cholecalciferol (VITAMIN D3) 50 mcg, oral, Daily   • diphenhydrAMINE (BENADRYL) 50 mg, oral, Every 6 hours PRN   • EPINEPHrine 0.3 mg/0.3 mL injection syringe 1 Syringe, injection, Once as needed   • hydrocortisone 2.5 % cream 1 Application, Topical, 2 times daily   • ibuprofen 200 mg, oral, Every 6 hours PRN   • meloxicam (Mobic) 15 mg tablet    • omega-3 fatty acids (FISH OIL CONCENTRATE ORAL) oral   • pravastatin (PRAVACHOL) 80 mg, oral, Daily       Allergies  Acetaminophen, Azithromycin, Cpm-pseudoephed-acetaminophen, Guaifenesin, Hydrocodone, Qtpedaslr-og-pvgwqvms-guaifen, and Ceftriaxone    Review of Systems   Constitutional:  Positive for fatigue. Negative for fever and unexpected weight change.   HENT:  Positive for trouble swallowing.    Eyes:  Negative for visual disturbance.   Respiratory:  Positive for apnea (sleep), cough and wheezing. Negative for shortness of breath.    Cardiovascular:  Negative for chest pain and palpitations.   Gastrointestinal:  Negative for abdominal pain, constipation, diarrhea, nausea and vomiting.   Endocrine: Negative for cold intolerance and heat intolerance.   Musculoskeletal:  Negative for neck pain.   Skin:  Negative for rash.        Dry   Neurological:  Positive for weakness. Negative for tremors and headaches.   Psychiatric/Behavioral:  The patient is not nervous/anxious.          Last Recorded Vitals  Blood pressure 146/81, pulse 85, weight 69.4 kg (153 lb).    Physical Exam  Constitutional:       General: She is not in acute distress.  HENT:      Head:  Normocephalic.      Mouth/Throat:      Mouth: Mucous membranes are moist.   Eyes:      Extraocular Movements: Extraocular movements intact.   Neck:      Comments: Multinodular goiter, 1.5x normal size, no dominant nodule, mobile with swallow  Cardiovascular:      Pulses:           Radial pulses are 2+ on the right side and 2+ on the left side.   Musculoskeletal:      Right lower leg: No edema.      Left lower leg: No edema.   Lymphadenopathy:      Cervical: No cervical adenopathy.   Neurological:      Mental Status: She is alert.      Motor: No tremor.   Psychiatric:         Mood and Affect: Affect normal.          Relevant Results  Lab Results   Component Value Date    TSH 0.64 08/17/2024         IMPRESSION  MULTINODULAR GOITER  History of multiple benign FNAB of right lobe  No compressive neck symptoms  Dysphagia, not likely related to goiter    RECOMMENDATIONS  Ultrasound thyroid  Results available on [employer]  Call/message if you have not received results in 3 days.       If no suspicious changes, plan follow up 1 year  Repeat ultrasound thyroid before next appointment

## 2024-10-01 NOTE — PROGRESS NOTES
History Of Present Illness  Teresa Max is a 73 y.o. female with multinodular goiter    FNAB two right lobe nodules benign (4/20/21). Mid-lobe nodule FLUS by cytology, benign by ThyraMir/ThyGeNEXT molecular testing  FNAB right superior nodule benign (11/7/23)    No neck symptoms    History of choking, dysphagia  Swallowing tests done in the hospital this summer  History of admission for pneumonia.       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    Surgical History  She has a past surgical history that includes Other surgical history (02/17/2016); Breast lumpectomy (02/17/2016); Other surgical history (07/21/2021); Other surgical history (02/16/2018); CT guided imaging for needle placement (05/22/2015); Cataract extraction (Right); Intraocular lens insertion; US guided thyroid biopsy (11/07/2023); Colonoscopy; and Upper gastrointestinal endoscopy (2023).     Social History  She reports that she has never smoked. She has never used smokeless tobacco. She reports current alcohol use. She reports that she does not use drugs.    Family History  Family History   Problem Relation Name Age of Onset    Other (brain tumor) Mother      Cancer Mother         Medications  Current Outpatient Medications   Medication Instructions    amLODIPine (Norvasc) 5 mg tablet     aspirin 81 mg, oral, Daily, AS DIRECTED.    cholecalciferol (VITAMIN D-3) 50,000 Units, oral, Once Weekly    cholecalciferol (VITAMIN D3) 50 mcg, oral, Daily    diphenhydrAMINE (BENADRYL) 50 mg, oral, Every 6 hours PRN    EPINEPHrine 0.3 mg/0.3 mL injection syringe 1  Syringe, injection, Once as needed    hydrocortisone 2.5 % cream 1 Application, Topical, 2 times daily    ibuprofen 200 mg, oral, Every 6 hours PRN    meloxicam (Mobic) 15 mg tablet     omega-3 fatty acids (FISH OIL CONCENTRATE ORAL) oral    pravastatin (PRAVACHOL) 80 mg, oral, Daily       Allergies  Acetaminophen, Azithromycin, Cpm-pseudoephed-acetaminophen, Guaifenesin, Hydrocodone, Djpihlmkm-fv-zgppucmy-guaifen, and Ceftriaxone    Review of Systems   Constitutional:  Positive for fatigue. Negative for fever and unexpected weight change.   HENT:  Positive for trouble swallowing.    Eyes:  Negative for visual disturbance.   Respiratory:  Positive for apnea (sleep), cough and wheezing. Negative for shortness of breath.    Cardiovascular:  Negative for chest pain and palpitations.   Gastrointestinal:  Negative for abdominal pain, constipation, diarrhea, nausea and vomiting.   Endocrine: Negative for cold intolerance and heat intolerance.   Musculoskeletal:  Negative for neck pain.   Skin:  Negative for rash.        Dry   Neurological:  Positive for weakness. Negative for tremors and headaches.   Psychiatric/Behavioral:  The patient is not nervous/anxious.          Last Recorded Vitals  Blood pressure 146/81, pulse 85, weight 69.4 kg (153 lb).    Physical Exam  Constitutional:       General: She is not in acute distress.  HENT:      Head: Normocephalic.      Mouth/Throat:      Mouth: Mucous membranes are moist.   Eyes:      Extraocular Movements: Extraocular movements intact.   Neck:      Comments: Multinodular goiter, 1.5x normal size, no dominant nodule, mobile with swallow  Cardiovascular:      Pulses:           Radial pulses are 2+ on the right side and 2+ on the left side.   Musculoskeletal:      Right lower leg: No edema.      Left lower leg: No edema.   Lymphadenopathy:      Cervical: No cervical adenopathy.   Neurological:      Mental Status: She is alert.      Motor: No tremor.   Psychiatric:         Mood and  Affect: Affect normal.          Relevant Results  Lab Results   Component Value Date    TSH 0.64 08/17/2024         IMPRESSION  MULTINODULAR GOITER  History of multiple benign FNAB of right lobe  No compressive neck symptoms  Dysphagia, not likely related to goiter    RECOMMENDATIONS  Ultrasound thyroid  Results available on [employer]  Call/message if you have not received results in 3 days.       If no suspicious changes, plan follow up 1 year  Repeat ultrasound thyroid before next appointment

## 2024-10-02 ENCOUNTER — APPOINTMENT (OUTPATIENT)
Dept: RADIOLOGY | Facility: HOSPITAL | Age: 74
End: 2024-10-02
Payer: MEDICARE

## 2024-10-02 ENCOUNTER — HOSPITAL ENCOUNTER (OUTPATIENT)
Dept: RADIOLOGY | Facility: HOSPITAL | Age: 74
Discharge: HOME | End: 2024-10-02
Payer: MEDICARE

## 2024-10-02 DIAGNOSIS — E04.2 NONTOXIC MULTINODULAR GOITER: ICD-10-CM

## 2024-10-02 PROCEDURE — 76536 US EXAM OF HEAD AND NECK: CPT

## 2024-11-04 ENCOUNTER — OFFICE VISIT (OUTPATIENT)
Dept: OPHTHALMOLOGY | Facility: CLINIC | Age: 74
End: 2024-11-04
Payer: MEDICARE

## 2024-11-04 DIAGNOSIS — H40.013 OPEN ANGLE WITH BORDERLINE FINDINGS, LOW RISK, BILATERAL: ICD-10-CM

## 2024-11-04 DIAGNOSIS — H04.123 DRY EYES, BILATERAL: ICD-10-CM

## 2024-11-04 DIAGNOSIS — Z96.1 ARTIFICIAL LENS PRESENT: ICD-10-CM

## 2024-11-04 DIAGNOSIS — H52.7 REFRACTIVE ERROR: ICD-10-CM

## 2024-11-04 DIAGNOSIS — H25.812 COMBINED FORMS OF AGE-RELATED CATARACT OF LEFT EYE: Primary | ICD-10-CM

## 2024-11-04 PROCEDURE — 92133 CPTRZD OPH DX IMG PST SGM ON: CPT | Performed by: OPHTHALMOLOGY

## 2024-11-04 PROCEDURE — 99214 OFFICE O/P EST MOD 30 MIN: CPT | Performed by: OPHTHALMOLOGY

## 2024-11-04 ASSESSMENT — PATIENT HEALTH QUESTIONNAIRE - PHQ9
1. LITTLE INTEREST OR PLEASURE IN DOING THINGS: NOT AT ALL
2. FEELING DOWN, DEPRESSED OR HOPELESS: NOT AT ALL
SUM OF ALL RESPONSES TO PHQ9 QUESTIONS 1 AND 2: 0

## 2024-11-04 ASSESSMENT — EXTERNAL EXAM - LEFT EYE: OS_EXAM: NORMAL

## 2024-11-04 ASSESSMENT — VISUAL ACUITY
OD_CC+: +1
CORRECTION_TYPE: GLASSES
METHOD: SNELLEN - SINGLE
OS_CC: 20/40
OD_CC: 20/40

## 2024-11-04 ASSESSMENT — KERATOMETRY
OS_K1POWER_DIOPTERS: 43.50
OD_AXISANGLE_DEGREES: 50
METHOD_AUTO_MANUAL: AUTOMATED
OS_AXISANGLE_DEGREES: 160
OS_AXISANGLE2_DEGREES: 70
OD_K1POWER_DIOPTERS: 44.25
OS_K2POWER_DIOPTERS: 44.00
OD_K2POWER_DIOPTERS: 44.50
OD_AXISANGLE2_DEGREES: 140

## 2024-11-04 ASSESSMENT — ENCOUNTER SYMPTOMS
RESPIRATORY NEGATIVE: 0
ALLERGIC/IMMUNOLOGIC NEGATIVE: 0
NEUROLOGICAL NEGATIVE: 0
CONSTITUTIONAL NEGATIVE: 0
EYES NEGATIVE: 0
GASTROINTESTINAL NEGATIVE: 0
CARDIOVASCULAR NEGATIVE: 0
MUSCULOSKELETAL NEGATIVE: 0
PSYCHIATRIC NEGATIVE: 0
HEMATOLOGIC/LYMPHATIC NEGATIVE: 0
ENDOCRINE NEGATIVE: 0

## 2024-11-04 ASSESSMENT — SLIT LAMP EXAM - LIDS: COMMENTS: MEIBOMIAN GLAND DYSFUNCTION

## 2024-11-04 ASSESSMENT — REFRACTION_MANIFEST
OD_CYLINDER: -0.25
METHOD_AUTOREFRACTION: 1
OS_AXIS: 085
OS_SPHERE: +1.25
OS_CYLINDER: -1.50
OD_AXIS: 065
OD_SPHERE: +1.00

## 2024-11-04 ASSESSMENT — REFRACTION_WEARINGRX
OD_ADD: +2.75
OD_SPHERE: +1.25
OS_CYLINDER: -1.25
OS_AXIS: 073
OS_SPHERE: +0.50
SPECS_TYPE: PAL
OS_ADD: +2.75

## 2024-11-04 ASSESSMENT — CUP TO DISC RATIO
OD_RATIO: 0.5
OS_RATIO: 0.45

## 2024-11-04 ASSESSMENT — PAIN SCALES - GENERAL: PAINLEVEL_OUTOF10: 0-NO PAIN

## 2024-11-04 ASSESSMENT — EXTERNAL EXAM - RIGHT EYE: OD_EXAM: NORMAL

## 2024-11-04 ASSESSMENT — TONOMETRY
OS_IOP_MMHG: 20
IOP_METHOD: GOLDMANN APPLANATION
OD_IOP_MMHG: 20

## 2024-11-04 NOTE — PROGRESS NOTES
Assessment/Plan   Problem List Items Addressed This Visit       Combined forms of age-related cataract of left eye - Primary     Non significant cataract noted on exam. Will plan to continue to monitor with serial exam.            Artificial lens present     Stable intraocular lens (IOL) right eye (OD), will monitor with serial exam.          Open angle with borderline findings, low risk, bilateral     IOP still at good level. OCT RNFL both eyes (OU) showing no obvious progression over three years and would consider suspect only. Will monitor with serial exam and OCT.          Refractive error     Refraction performed today for diagnostic purposes only and without specific intent to dispense Rx. Glasses/SCL Rx not dispensed today.            Dry eyes, bilateral     Appears dry on exam and advised on role of regular lubrication for comfort and vision benefits.             Provided reassurance regarding above diagnoses and care received in the office visit today. Discussed outcomes and options along with the importance of treatment compliance. Understands the importance of any follow up visits. Patient instructed to call/communicate with our office if any new issues, questions, or concerns.     Will plan to see back in 1 year full OCT nerve or sooner PRN

## 2024-11-04 NOTE — ASSESSMENT & PLAN NOTE
IOP still at good level. OCT RNFL both eyes (OU) showing no obvious progression over three years and would consider suspect only. Will monitor with serial exam and OCT.

## 2024-11-04 NOTE — PATIENT INSTRUCTIONS
10 Thank you so much for choosing me to provide your care today!    If you were dilated your vision may remain blurry   or light sensitive for several hours.    The nature of eye and vision problems can require frequent follow up, please make every effort to adhere to any future appointments.    If you have any issues, questions, or concerns,   please do not hesitate to reach out.    If you receive a survey in regards to your care today, please mention any exceptional care my office staff and/or technicians provided.    You can reach our office at this number:    [phone]    Please consider signing up for and utilizing [facility]!  This is the best way to directly reach me or other  providers    Artificial Tears/lubricating drops  Recommendations for daily use      Refresh  [x]Refresh Tears []Refresh Advance  []Refresh Optive  []Refresh LiquiGel  []Refresh preservative free in single use vial    Systane  [x]Systane ultra []Systane Balance  []Systane Gel   []Systane preservative free in single use vial    Genteal  []Genteal  []Genteal gel    Blink  []Blink   []Blink for contacts      You may use these drops:  [x]1 drop 2-4 times daily  []1 drop 4 times daily  []1 drop every 1-2 hours or as needed  []At bedtime    It is OK to substitute generic store brand varieties of drops. Artificial tears work best when used consistently instead of as needed.    *Avoid any drops for allergy/itching/redness unless directed otherwise*

## 2024-12-16 ENCOUNTER — HOSPITAL ENCOUNTER (OUTPATIENT)
Dept: RADIOLOGY | Facility: HOSPITAL | Age: 74
Discharge: HOME | End: 2024-12-16
Payer: MEDICARE

## 2024-12-16 DIAGNOSIS — M25.562 PAIN IN LEFT KNEE: ICD-10-CM

## 2024-12-16 PROCEDURE — 73721 MRI JNT OF LWR EXTRE W/O DYE: CPT | Mod: LEFT SIDE | Performed by: STUDENT IN AN ORGANIZED HEALTH CARE EDUCATION/TRAINING PROGRAM

## 2024-12-16 PROCEDURE — 73721 MRI JNT OF LWR EXTRE W/O DYE: CPT | Mod: LT

## 2025-03-26 ENCOUNTER — HOSPITAL ENCOUNTER (OUTPATIENT)
Dept: RADIOLOGY | Facility: CLINIC | Age: 75
Discharge: HOME | End: 2025-03-26
Payer: MEDICARE

## 2025-03-26 DIAGNOSIS — C34.12 PRIMARY ADENOCARCINOMA OF UPPER LOBE OF LEFT LUNG (MULTI): ICD-10-CM

## 2025-03-26 PROCEDURE — 71250 CT THORAX DX C-: CPT

## 2025-03-26 PROCEDURE — 70553 MRI BRAIN STEM W/O & W/DYE: CPT | Performed by: RADIOLOGY

## 2025-03-26 PROCEDURE — 2550000001 HC RX 255 CONTRASTS: Performed by: STUDENT IN AN ORGANIZED HEALTH CARE EDUCATION/TRAINING PROGRAM

## 2025-03-26 PROCEDURE — A9575 INJ GADOTERATE MEGLUMI 0.1ML: HCPCS | Performed by: STUDENT IN AN ORGANIZED HEALTH CARE EDUCATION/TRAINING PROGRAM

## 2025-03-26 PROCEDURE — 70553 MRI BRAIN STEM W/O & W/DYE: CPT

## 2025-03-26 RX ORDER — GADOTERATE MEGLUMINE 376.9 MG/ML
14 INJECTION INTRAVENOUS
Status: COMPLETED | OUTPATIENT
Start: 2025-03-26 | End: 2025-03-26

## 2025-03-26 RX ADMIN — GADOTERATE MEGLUMINE 14 ML: 376.9 INJECTION INTRAVENOUS at 09:30

## 2025-03-27 ENCOUNTER — HOSPITAL ENCOUNTER (OUTPATIENT)
Dept: RADIOLOGY | Facility: HOSPITAL | Age: 75
Discharge: HOME | End: 2025-03-27
Payer: MEDICARE

## 2025-03-27 DIAGNOSIS — M54.2 CERVICALGIA: ICD-10-CM

## 2025-03-27 PROCEDURE — 70490 CT SOFT TISSUE NECK W/O DYE: CPT

## 2025-03-28 ENCOUNTER — TELEPHONE (OUTPATIENT)
Dept: HEMATOLOGY/ONCOLOGY | Facility: CLINIC | Age: 75
End: 2025-03-28
Payer: MEDICARE

## 2025-03-28 NOTE — TELEPHONE ENCOUNTER
Called and spoke with patient regarding her question about CBC. Advised patient that per Dr. Kincaid's last note she would like her to get lab work on her follow up visit. Patient confirmed that she will get her labs drawn here on the day of her appointment. Patient verbalized understanding,  No further questions at this.

## 2025-04-03 ENCOUNTER — OFFICE VISIT (OUTPATIENT)
Dept: HEMATOLOGY/ONCOLOGY | Facility: CLINIC | Age: 75
End: 2025-04-03
Payer: MEDICARE

## 2025-04-03 ENCOUNTER — APPOINTMENT (OUTPATIENT)
Dept: HEMATOLOGY/ONCOLOGY | Facility: CLINIC | Age: 75
End: 2025-04-03
Payer: MEDICARE

## 2025-04-03 ENCOUNTER — LAB (OUTPATIENT)
Dept: LAB | Facility: CLINIC | Age: 75
End: 2025-04-03
Payer: MEDICARE

## 2025-04-03 VITALS
OXYGEN SATURATION: 94 % | BODY MASS INDEX: 29.12 KG/M2 | HEART RATE: 87 BPM | RESPIRATION RATE: 19 BRPM | DIASTOLIC BLOOD PRESSURE: 77 MMHG | WEIGHT: 154.21 LBS | HEIGHT: 61 IN | TEMPERATURE: 96.8 F | SYSTOLIC BLOOD PRESSURE: 147 MMHG

## 2025-04-03 DIAGNOSIS — C34.12 PRIMARY ADENOCARCINOMA OF UPPER LOBE OF LEFT LUNG (MULTI): ICD-10-CM

## 2025-04-03 DIAGNOSIS — C78.00 METASTATIC LUNG CARCINOMA, UNSPECIFIED LATERALITY: Primary | ICD-10-CM

## 2025-04-03 DIAGNOSIS — C79.31 LUNG CANCER METASTATIC TO BRAIN (MULTI): ICD-10-CM

## 2025-04-03 DIAGNOSIS — C34.90 LUNG CANCER METASTATIC TO BRAIN (MULTI): ICD-10-CM

## 2025-04-03 LAB
ALBUMIN SERPL BCP-MCNC: 4.3 G/DL (ref 3.4–5)
ALP SERPL-CCNC: 77 U/L (ref 33–136)
ALT SERPL W P-5'-P-CCNC: 15 U/L (ref 7–45)
ANION GAP SERPL CALC-SCNC: 10 MMOL/L (ref 10–20)
AST SERPL W P-5'-P-CCNC: 14 U/L (ref 9–39)
BASOPHILS # BLD AUTO: 0.03 X10*3/UL (ref 0–0.1)
BASOPHILS NFR BLD AUTO: 0.5 %
BILIRUB SERPL-MCNC: 0.6 MG/DL (ref 0–1.2)
BUN SERPL-MCNC: 15 MG/DL (ref 6–23)
CALCIUM SERPL-MCNC: 9.8 MG/DL (ref 8.6–10.3)
CHLORIDE SERPL-SCNC: 104 MMOL/L (ref 98–107)
CO2 SERPL-SCNC: 31 MMOL/L (ref 21–32)
CREAT SERPL-MCNC: 0.72 MG/DL (ref 0.5–1.05)
EGFRCR SERPLBLD CKD-EPI 2021: 89 ML/MIN/1.73M*2
EOSINOPHIL # BLD AUTO: 0.17 X10*3/UL (ref 0–0.4)
EOSINOPHIL NFR BLD AUTO: 2.9 %
ERYTHROCYTE [DISTWIDTH] IN BLOOD BY AUTOMATED COUNT: 13.5 % (ref 11.5–14.5)
GLUCOSE SERPL-MCNC: 101 MG/DL (ref 74–99)
HCT VFR BLD AUTO: 41.7 % (ref 36–46)
HGB BLD-MCNC: 13.4 G/DL (ref 12–16)
IMM GRANULOCYTES # BLD AUTO: 0 X10*3/UL (ref 0–0.5)
IMM GRANULOCYTES NFR BLD AUTO: 0 % (ref 0–0.9)
LYMPHOCYTES # BLD AUTO: 1.49 X10*3/UL (ref 0.8–3)
LYMPHOCYTES NFR BLD AUTO: 25.3 %
MCH RBC QN AUTO: 27.6 PG (ref 26–34)
MCHC RBC AUTO-ENTMCNC: 32.1 G/DL (ref 32–36)
MCV RBC AUTO: 86 FL (ref 80–100)
MONOCYTES # BLD AUTO: 0.72 X10*3/UL (ref 0.05–0.8)
MONOCYTES NFR BLD AUTO: 12.2 %
NEUTROPHILS # BLD AUTO: 3.49 X10*3/UL (ref 1.6–5.5)
NEUTROPHILS NFR BLD AUTO: 59.1 %
NRBC BLD-RTO: 0 /100 WBCS (ref 0–0)
PLATELET # BLD AUTO: 159 X10*3/UL (ref 150–450)
POTASSIUM SERPL-SCNC: 3.9 MMOL/L (ref 3.5–5.3)
PROT SERPL-MCNC: 7.3 G/DL (ref 6.4–8.2)
RBC # BLD AUTO: 4.86 X10*6/UL (ref 4–5.2)
SODIUM SERPL-SCNC: 141 MMOL/L (ref 136–145)
WBC # BLD AUTO: 5.9 X10*3/UL (ref 4.4–11.3)

## 2025-04-03 PROCEDURE — 3008F BODY MASS INDEX DOCD: CPT | Performed by: NURSE PRACTITIONER

## 2025-04-03 PROCEDURE — 36415 COLL VENOUS BLD VENIPUNCTURE: CPT

## 2025-04-03 PROCEDURE — 84075 ASSAY ALKALINE PHOSPHATASE: CPT

## 2025-04-03 PROCEDURE — 1159F MED LIST DOCD IN RCRD: CPT | Performed by: NURSE PRACTITIONER

## 2025-04-03 PROCEDURE — 99213 OFFICE O/P EST LOW 20 MIN: CPT | Performed by: NURSE PRACTITIONER

## 2025-04-03 PROCEDURE — 1125F AMNT PAIN NOTED PAIN PRSNT: CPT | Performed by: NURSE PRACTITIONER

## 2025-04-03 PROCEDURE — 85025 COMPLETE CBC W/AUTO DIFF WBC: CPT

## 2025-04-03 ASSESSMENT — PAIN SCALES - GENERAL: PAINLEVEL_OUTOF10: 6

## 2025-04-03 NOTE — PROGRESS NOTES
Subjective:   Patient Name: Teresa Max    : 1950     MRN: [id number]     Age: 73 y.o.     Gender: female  Referring Provider: SKIP    CC: Management of stage IIIB ([id number]) lung adenocarcinoma of OLIVER dx in 2015 s/p concurrent chemoRTàmetastatic recurrence  in 2016 with brain lesion, axillary LNs s/p gamma knife, alectinib 2016-2018-->again recurrence in 2018 s/p keytruda  2018-2020. PDL-1 50%, NGS showed ALK fusion. GALLO. TMB 7.9. Currently on surveillance.     Presenting History (2023):  At time of initial presentation a 72 F, remote lighter smoker (when she was a teenage) with PMHx of DCISof L breast in , HTN, HLD, NSCLC presented here for continued care of her lung cancer. She was initially diagnosed with stage IIIB (T1N3M0) on  2015. PET/CT on 2015 showed primary lung carcinoma in the OLIVER measuring 1.2cm, with large L and R hilar LAD. Brain MRI (+/-) 2015 no brain metastases. She underwent concurrent chemoRT with cisplatin and pemetrexed completed in 2015.  She did not tolerate very well.     Unfortunately, she had metastatic recurrence in 2016 with PET/CT on 2016 showed high hypermetabolic activity in the L axillary LNs, consistent with mets, which was biopsy proven to be metastatic lung adenocarcinoma. Brain MRI (+/-) on 2016  showed bilateral enhancing lesions within the occipital lobes.She underwent gamma knife with Dr. Diaz to 3 brain lesions. She was then started on alectinib 600mg bid in 2016.     Unfortunately, her PET/CT on 1/3/2018 showed POS with interval worsening of hypermetabolic L axillary and retropectoral LAD. Brain MRI 2018: unchanged. L axillary LN biopsy on 2018 which showed poorly differentiated lung adenocarcinoma. PDL-1  50%. NGS showed ALK (20)-EML4(6) fusion, and IDH1 bB437I (c338T>C) mutation, TMB 7.9 m/MB, MSI stable. She was then started on Keytruda on 2018-2020. She has been on  surveillance since. Most recent CT CAP (+) on 3/6/2023 showed no evidence  of recurrence except for mosaic lung pattern and RML fibrosis. 4.3cm L adnexal cyst. Brain MRI (+/-) 03/01/2023: Interval decreased size of the enhancing lesion within the left occipital lobe measuring 0.4 cm, previously 0.5 cm. Decreased surrounding  T2 and FLAIR hyperintense signal compatible with vasogenic edema. No new enhancing lesions.     She has been having chronic back pain, worsening with standing and moving, intermittently, tolerable. No fever or chills. No cough or SOB. No n/v/d/c. No HA or vision change.      PMHx: DCIS 2008, HTN, HLD, NSCLC  PSHx: Breast lumpectomy in 2008  SHx: Remote light smoker, no etoh or illicit drugs. She worked at [employer]. Lives with . 3 children and 4 grandchildren.   FHx: Mother had brain tumor (unclear primary site). No other family hx of cancer.         Treatment Summary:  05/2015-07/2015: Concurrent chemoRT with cisplatin/pemetrexed  03/2016: Gamma knife to 3 brain mets (Dr. Mickey Diaz) L occipital (18 Gy x 1 fraction), R occipital (20 Gy x 1 fraction) and L precuneus (20 Gy x 1 fraction)  04/2016 - 06/2018: started Alectinib 300mg bid-->600mg bid PO  06/11/2018-08/26/2020: Keytruda    Interval History (4/3/2025):    Patient returns today for routine follow-up evaluation and scan review. She is anxious to hear her scan results but otherwise doing well. Her primary complaint is chronic back and knee pain - managed by PCP. She has also noted GERD. Patient had upper URI Jan -> February 2025 - 1 month cough now resolved. She denies any current fevers, chills or night sweats. No cough or chest pain. + ELIAS with some activities - recovers quickly. No nausea or vomiting. No constipation or diarrhea. No urinary symptoms. No rash. No neuropathy.     Review of Systems:  A review of systems has been completed and are negative for complaints except what is stated in the HPI and/or past medical  history        Medical History:   Past Medical History:   Diagnosis Date    Breast cancer (Multi)     rt breast cancer with lumpectomy and radiation    Combined forms of age-related cataract of left eye     Disorder of refraction     Eyelid inflammation     Intraductal carcinoma in situ of unspecified breast 02/19/2018    DCIS (ductal carcinoma in situ) of breast    Malignant neoplasm of unspecified part of unspecified bronchus or lung (Multi) 02/17/2016    Non-small cell lung cancer (NSCLC)    Open angle with borderline findings, low risk, bilateral     Personal history of irradiation     Pseudophakia        Surgical History:   Past Surgical History:   Procedure Laterality Date    BREAST LUMPECTOMY  02/17/2016    Breast Surgery Lumpectomy    CATARACT EXTRACTION Right     2016 Dr Barry    COLONOSCOPY      polypectomy    CT GUIDED IMAGING FOR NEEDLE PLACEMENT  05/22/2015    CT GUIDED IMAGING FOR NEEDLE PLACEMENT [facility]    INTRAOCULAR LENS INSERTION      OTHER SURGICAL HISTORY  02/17/2016    Biopsy Lung Percutaneous    OTHER SURGICAL HISTORY  07/21/2021    Tonsillectomy with adenoidectomy    OTHER SURGICAL HISTORY  02/16/2018    Radiation Therapy    UPPER GASTROINTESTINAL ENDOSCOPY  2023    GERD - Dr. Germaine Liriano    US GUIDED THYROID BIOPSY  11/07/2023    US GUIDED THYROID BIOPSY 11/7/2023 CAT US       Family History:  Family History   Problem Relation Name Age of Onset    Other (brain tumor) Mother      Cancer Mother         Allergies:  Allergies   Allergen Reactions    Acetaminophen Anaphylaxis, Hives, Itching and Rash    Azithromycin Anaphylaxis, Hives, Itching and Rash    Cpm-Pseudoephed-Acetaminophen Anaphylaxis    Guaifenesin Anaphylaxis and Rash    Hydrocodone Hives and Itching    Muoftavze-Xk-Eilzdpsx-Guaifen Unknown    Ceftriaxone Itching       Meds (Current):      Current Outpatient Medications:     amLODIPine (Norvasc) 5 mg tablet, , Disp: , Rfl:     aspirin 81 mg EC tablet, Take 1 tablet (81  mg) by mouth once daily. AS DIRECTED., Disp: , Rfl:     cholecalciferol (Vitamin D-3) 1,250 mcg (50,000 unit) capsule, Take 1 capsule (50,000 Units) by mouth 1 (one) time per week., Disp: , Rfl:     cholecalciferol (Vitamin D3) 50 mcg (2,000 unit) capsule, Take 1 capsule (50 mcg) by mouth once daily., Disp: , Rfl:     diphenhydrAMINE (BENADryl) 50 mg capsule, Take 1 capsule (50 mg) by mouth every 6 hours if needed for itching., Disp: , Rfl:     EPINEPHrine 0.3 mg/0.3 mL injection syringe, Inject 0.3 mL (0.3 mg) as directed 1 time if needed (shortness of breath)., Disp: , Rfl:     hydrocortisone 2.5 % cream, Apply 1 Application topically 2 times a day., Disp: , Rfl:     ibuprofen 200 mg tablet, Take 1 tablet (200 mg) by mouth every 6 hours if needed., Disp: , Rfl:     meloxicam (Mobic) 15 mg tablet, , Disp: , Rfl:     omega-3 fatty acids (FISH OIL CONCENTRATE ORAL), Take by mouth. (Patient not taking: Reported on 4/3/2025), Disp: , Rfl:     pravastatin (Pravachol) 80 mg tablet, Take 1 tablet (80 mg) by mouth once daily., Disp: , Rfl:       Performance Status (ECOG): 1    ECOG Definition  0 Fully active; no performance restrictions.  1 Strenuous physical activity restricted; fully ambulatory and able to carry out light work.  2 Capable of all self-care but unable to carry out any work activities. Up and about >50% of waking hours.  3 Capable of only limited self-care; confined to bed or chair >50% of waking hours.  4 Completely disabled; cannot carry out any self-care; totally confined to bed or chair.    Physical Exam:  ECO  Pain: chronic back/knee pain  Constitutional: Well developed, awake/alert/oriented x3, no distress, alert and cooperative  Eyes: PER. sclera anicteric  ENMT: Oral mucosa moist, no lesions or thrush identified  Respiratory/Thorax: Breathing is non-labored. Lungs are clear to auscultation bilaterally. No adventitious breath sounds  Cardiovascular: S1-S2. Regular rate and rhythm. No murmurs,  rubs, or gallops appreciated  Gastrointestinal: Abdomen soft nontender, nondistended, normal active bowel sounds.  Musculoskeletal: ROM intact, no joint swelling, normal strength  Extremities: normal extremities, no cyanosis, no edema, no clubbing  Lymphatics: no palpable lymphadenopathy   Skin: no rash  Neurologic: alert and oriented x3. Nonfocal exam. No myoclonus  Psychological: Pleasant, appropriate and easily engaged     Labs:  Lab Results   Component Value Date    WBC 5.9 04/03/2025    HGB 13.4 04/03/2025    HCT 41.7 04/03/2025    MCV 86 04/03/2025     04/03/2025      Lab Results   Component Value Date    NEUTROABS 3.49 04/03/2025      Lab Results   Component Value Date    GLUCOSE 101 (H) 04/03/2025    CALCIUM 9.8 04/03/2025     04/03/2025    K 3.9 04/03/2025    CO2 31 04/03/2025     04/03/2025    BUN 15 04/03/2025    CREATININE 0.72 04/03/2025    MG 1.80 08/17/2024     Lab Results   Component Value Date    ALT 15 04/03/2025    AST 14 04/03/2025    ALKPHOS 77 04/03/2025    BILITOT 0.6 04/03/2025      Lab Results   Component Value Date    CORTISOL 11.2 11/04/2019    TSH 0.64 08/17/2024       Imaging: - Reviewed with patient   IMPRESSION:  Unchanged rounded 3-4-mm nodule laterally in the right upper lobe on series 4, image 76.      Unchanged appearance of bilateral perihilar consolidative opacities with parenchymal volume loss and thickening of the axial  interstitium, compatible with treated malignancy.       Similar appearance of mild mosaic attenuation of the lung parenchyma that probably relates to air trapping from bronchial wall thickening.      No new parenchymal abnormality. No pleural effusion or pneumothorax.      Similar mild cardiomegaly.      Mildly patulous distal esophagus containing fluid with mild circumferential wall thickening, suggesting GERD.    IMPRESSION:  *Slight decrease in the measurement of the previously demonstrated enhancing nodule in the left occipital pole *No  new foci of abnormal  enhancement *No evidence of acute infarction or hemorrhage.        Assessment/Plan:   73 y.o. female with past medical history as stated referred for management of lung cancer    The patient's records and imaging have been reviewed in detail.  I have discussed with the patient the natural history of their disease at length.  The following has also been discussed with the patient:    # Cancer:  Management of stage IIIB ([id number]) lung adenocarcinoma of OLIVER dx in 05/2015 s/p concurrent chemoRT -->metastatic recurrence in 01/2016 with brain lesion, axillary LNs s/p gamma knife, alectinib 04/2016-06/2018 --> again recurrence in 06/2018 s/p keytruda 06/2018-08/2020. PDL-1 50%, NGS showed ALK fusion. GALLO. TMB 7.9. Currently on surveillance. Continue to do well. No evidence of recurrence.      - Interval Imaging: CT Chest (-) on 09/19/2024 showed 1. No evidence of disease recurrence within the chest. 2. A stable 4 mm nodule is seen in the right upper lobe, which bears attention on follow-up imaging. 3. Stable bilateral perihilar opacification with architectural distortion, compatible with postradiation changes. 4. Stable bilateral lower lobe predominant mosaic attenuation, likely reflecting small vessel/airway disease. 5. Right breast masslike opacification, similar to prior exam, likely reflecting post lumpectomy changes, correlation with mammography is recommended. MRI brain (+/-) 9/19/2024: Unchanged 4 mm enhancing lesion in the left occipital lobe with mild surrounding edema versus gliosis.  No acute intracranial abnormality. Moderate microangiopathic disease. No new or enlarging enhancing lesions. Repeat CT chest and MRI brain in 6 months.    # DCIS: dx in 2008, s/p lumpectomy and RT and 5 years of hormonal therapy. Most recent mammogram 02/2023: no evidence of recurrence.  To follow up with her GYN.     # Lower back pain: no evidence of mets. Continue to follow with PCP.     # L adnexal cyst: US  showed fibroid uterus and L ovarian cyst. To follow with her GYN    # Thyroid nodule: s/p thyroid nodule biopsy on 4/20/2021: benign follicular nodule.     # Clinical Trials:  None currently.      # Access: Peripheral veins.     # Pain: No acute pain.     # Bone Health: No signs of bony disease.      # GI/CINV: No acute issues.  Eating and voiding well.  Nutrition consult offered.     # Smoking: Former smoker     # Fertility: N/A.  Oncofertility support available as needed.       # Heme/ESAs: N/A.     # GOC: Patient is aware of palliative intent goals of care.     # Language: English.     # Dispo: RTC in 6 months  MRI brain and CT chest prior to visit - ordered     Justen Baum, APRN-CNP  [facility]  Thoracic + [facility]     I spent a total of 30+ minutes on the date of the service which included preparing to see the patient, face-to-face patient care, completing clinical documentation, obtaining and / or reviewing separately obtained history, counseling and educating the patient/family/caregiver, ordering medications, tests, or procedures, communicating with other healthcare providers (not separately reported), independently interpreting results, not separately reported, and communicating results to the patient/family/caregiver, Name and date of birth verified.

## 2025-04-03 NOTE — PROGRESS NOTES
Patient here for follow up visit Adenocarcinoma OLIVER Hx multi nodule goiter.      Denies N/V/D/C  No concerns or complaints noted at this time.   Patient here alone    Medications and Allergies reviewed and reconciled this visit.    Follow up per MD request.    Patient reports availability and use of mychart, Reviewed this is a good place to communicate with the team as well as review labs and upcoming orders.      No barriers to education noted, patient agrees to current plan and verbalized understanding using teach back method.

## 2025-04-11 ENCOUNTER — HOSPITAL ENCOUNTER (OUTPATIENT)
Dept: RADIOLOGY | Facility: HOSPITAL | Age: 75
Discharge: HOME | End: 2025-04-11
Payer: MEDICARE

## 2025-04-11 VITALS — WEIGHT: 150 LBS | BODY MASS INDEX: 29.45 KG/M2 | HEIGHT: 60 IN

## 2025-04-11 DIAGNOSIS — Z12.31 ENCOUNTER FOR SCREENING MAMMOGRAM FOR MALIGNANT NEOPLASM OF BREAST: ICD-10-CM

## 2025-04-11 PROCEDURE — 77063 BREAST TOMOSYNTHESIS BI: CPT

## 2025-09-02 ENCOUNTER — APPOINTMENT (OUTPATIENT)
Dept: OTOLARYNGOLOGY | Facility: CLINIC | Age: 75
End: 2025-09-02
Payer: MEDICARE

## 2025-10-01 ENCOUNTER — APPOINTMENT (OUTPATIENT)
Dept: ENDOCRINOLOGY | Facility: CLINIC | Age: 75
End: 2025-10-01
Payer: MEDICARE

## 2025-11-12 ENCOUNTER — APPOINTMENT (OUTPATIENT)
Dept: OPHTHALMOLOGY | Facility: CLINIC | Age: 75
End: 2025-11-12
Payer: MEDICARE